# Patient Record
Sex: FEMALE | Race: WHITE | NOT HISPANIC OR LATINO | Employment: OTHER | URBAN - METROPOLITAN AREA
[De-identification: names, ages, dates, MRNs, and addresses within clinical notes are randomized per-mention and may not be internally consistent; named-entity substitution may affect disease eponyms.]

---

## 2023-02-24 PROBLEM — E78.2 HYPERLIPEMIA, MIXED: Status: ACTIVE | Noted: 2023-02-24

## 2023-02-24 PROBLEM — B02.9 SHINGLES: Status: ACTIVE | Noted: 2023-02-24

## 2023-02-24 PROBLEM — R51.9 HEADACHE: Status: ACTIVE | Noted: 2023-02-24

## 2023-02-24 PROBLEM — D49.0 IPMN (INTRADUCTAL PAPILLARY MUCINOUS NEOPLASM): Status: ACTIVE | Noted: 2023-02-24

## 2023-02-24 PROBLEM — F32.A ANXIETY AND DEPRESSION: Status: ACTIVE | Noted: 2023-02-24

## 2023-02-24 PROBLEM — H61.20 CERUMEN IMPACTION: Status: ACTIVE | Noted: 2023-02-24

## 2023-02-24 PROBLEM — R06.02 SHORTNESS OF BREATH: Status: ACTIVE | Noted: 2023-02-24

## 2023-02-24 PROBLEM — M54.50 LOWER BACK PAIN: Status: ACTIVE | Noted: 2023-02-24

## 2023-02-24 PROBLEM — F41.9 ANXIETY AND DEPRESSION: Status: ACTIVE | Noted: 2023-02-24

## 2023-02-24 PROBLEM — G47.00 INSOMNIA: Status: ACTIVE | Noted: 2023-02-24

## 2023-02-24 PROBLEM — M25.559 JOINT PAIN, HIP: Status: ACTIVE | Noted: 2023-02-24

## 2023-02-24 PROBLEM — R42 DIZZINESS: Status: ACTIVE | Noted: 2023-02-24

## 2023-02-24 PROBLEM — R51.9 PAIN OF SCALP: Status: ACTIVE | Noted: 2023-02-24

## 2023-02-24 PROBLEM — I88.9 CERVICAL LYMPHADENITIS: Status: ACTIVE | Noted: 2023-02-24

## 2023-02-24 PROBLEM — B37.0 THRUSH: Status: ACTIVE | Noted: 2023-02-24

## 2023-02-24 PROBLEM — M25.551 RIGHT HIP PAIN: Status: ACTIVE | Noted: 2023-02-24

## 2023-02-24 PROBLEM — M79.10 MYALGIA, UNSPECIFIED SITE: Status: ACTIVE | Noted: 2023-02-24

## 2023-02-24 PROBLEM — K86.89 PANCREATIC MASS (HHS-HCC): Status: ACTIVE | Noted: 2023-02-24

## 2023-02-24 PROBLEM — M19.90 ARTHRITIS: Status: ACTIVE | Noted: 2023-02-24

## 2023-02-24 PROBLEM — J45.909 ASTHMA (HHS-HCC): Status: ACTIVE | Noted: 2023-02-24

## 2023-02-24 PROBLEM — L30.4 INTERTRIGO: Status: ACTIVE | Noted: 2023-02-24

## 2023-02-24 PROBLEM — L40.50 PA (PSORIATIC ARTHRITIS) (MULTI): Status: ACTIVE | Noted: 2023-02-24

## 2023-02-24 PROBLEM — S16.1XXA STRAIN OF NECK MUSCLE: Status: ACTIVE | Noted: 2023-02-24

## 2023-02-24 PROBLEM — M70.62 TROCHANTERIC BURSITIS OF LEFT HIP: Status: ACTIVE | Noted: 2023-02-24

## 2023-02-24 PROBLEM — M23.91 INTERNAL DERANGEMENT OF RIGHT KNEE: Status: ACTIVE | Noted: 2023-02-24

## 2023-02-24 PROBLEM — M53.86 SCIATICA OF LEFT SIDE ASSOCIATED WITH DISORDER OF LUMBAR SPINE: Status: ACTIVE | Noted: 2023-02-24

## 2023-02-24 PROBLEM — R26.9 ABNORMAL GAIT: Status: ACTIVE | Noted: 2023-02-24

## 2023-02-24 PROBLEM — I10 BENIGN ESSENTIAL HYPERTENSION: Status: ACTIVE | Noted: 2023-02-24

## 2023-02-24 PROBLEM — S52.309A: Status: ACTIVE | Noted: 2023-02-24

## 2023-02-24 PROBLEM — M72.2 PLANTAR FASCIITIS: Status: ACTIVE | Noted: 2023-02-24

## 2023-02-24 PROBLEM — R06.2 WHEEZING: Status: ACTIVE | Noted: 2023-02-24

## 2023-02-24 PROBLEM — D27.9 SEROUS CYSTADENOMA: Status: ACTIVE | Noted: 2023-02-24

## 2023-02-24 PROBLEM — R25.1 TREMOR: Status: ACTIVE | Noted: 2023-02-24

## 2023-02-24 PROBLEM — M65.30 TRIGGER FINGER, ACQUIRED: Status: ACTIVE | Noted: 2023-02-24

## 2023-02-24 PROBLEM — R20.2 TINGLING: Status: ACTIVE | Noted: 2023-02-24

## 2023-02-24 PROBLEM — F32.0 MILD MAJOR DEPRESSION, SINGLE EPISODE (CMS-HCC): Status: ACTIVE | Noted: 2023-02-24

## 2023-02-24 PROBLEM — M25.561 RIGHT KNEE PAIN: Status: ACTIVE | Noted: 2023-02-24

## 2023-02-24 PROBLEM — E11.9 TYPE 2 DIABETES MELLITUS WITHOUT COMPLICATION (MULTI): Status: ACTIVE | Noted: 2023-02-24

## 2023-02-24 PROBLEM — R10.9 ABDOMINAL PAIN: Status: ACTIVE | Noted: 2023-02-24

## 2023-02-24 PROBLEM — S83.249A TEAR OF MEDIAL MENISCUS OF KNEE: Status: ACTIVE | Noted: 2023-02-24

## 2023-02-24 PROBLEM — S80.01XA CONTUSION OF RIGHT KNEE: Status: ACTIVE | Noted: 2023-02-24

## 2023-02-24 PROBLEM — M17.11 PRIMARY OSTEOARTHRITIS OF RIGHT KNEE: Status: ACTIVE | Noted: 2023-02-24

## 2023-02-24 PROBLEM — M54.31 SCIATICA OF RIGHT SIDE: Status: ACTIVE | Noted: 2023-02-24

## 2023-02-24 PROBLEM — M31.6 TEMPORAL ARTERITIS (MULTI): Status: ACTIVE | Noted: 2023-02-24

## 2023-02-24 PROBLEM — H00.014 HORDEOLUM EXTERNUM OF LEFT UPPER EYELID: Status: ACTIVE | Noted: 2023-02-24

## 2023-02-24 PROBLEM — M25.552 HIP PAIN, ACUTE, LEFT: Status: ACTIVE | Noted: 2023-02-24

## 2023-02-24 PROBLEM — S16.1XXA CERVICAL STRAIN: Status: RESOLVED | Noted: 2023-02-24 | Resolved: 2023-02-24

## 2023-02-24 PROBLEM — L40.9 PSORIASIS: Status: ACTIVE | Noted: 2023-02-24

## 2023-02-24 PROBLEM — S83.241A TEAR OF MEDIAL MENISCUS OF RIGHT KNEE, CURRENT: Status: ACTIVE | Noted: 2023-02-24

## 2023-02-24 PROBLEM — D37.8 NEOPLASM OF UNCERTAIN BEHAVIOR OF PANCREAS: Status: ACTIVE | Noted: 2023-02-24

## 2023-02-24 PROBLEM — R92.8 ABNORMAL MAMMOGRAM: Status: ACTIVE | Noted: 2023-02-24

## 2023-02-24 PROBLEM — C54.1 ENDOMETRIAL CARCINOMA (MULTI): Status: ACTIVE | Noted: 2023-02-24

## 2023-02-24 PROBLEM — R79.89 D-DIMER, ELEVATED: Status: ACTIVE | Noted: 2023-02-24

## 2023-02-24 PROBLEM — R05.9 COUGH: Status: ACTIVE | Noted: 2023-02-24

## 2023-02-24 PROBLEM — S16.1XXA CERVICAL STRAIN: Status: ACTIVE | Noted: 2023-02-24

## 2023-02-24 PROBLEM — S89.91XA INJURY OF RIGHT KNEE: Status: ACTIVE | Noted: 2023-02-24

## 2023-02-24 RX ORDER — ALBUTEROL SULFATE 90 UG/1
1-2 AEROSOL, METERED RESPIRATORY (INHALATION)
COMMUNITY
End: 2023-12-01 | Stop reason: ALTCHOICE

## 2023-02-24 RX ORDER — IBUPROFEN 600 MG/1
1 TABLET ORAL EVERY 6 HOURS
COMMUNITY
Start: 2019-08-30

## 2023-02-24 RX ORDER — METFORMIN HYDROCHLORIDE 500 MG/1
1000 TABLET, EXTENDED RELEASE ORAL 2 TIMES DAILY
COMMUNITY
Start: 2020-08-28 | End: 2023-07-19 | Stop reason: SDUPTHER

## 2023-02-24 RX ORDER — NYSTATIN 100000 U/G
CREAM TOPICAL
COMMUNITY
Start: 2020-10-30

## 2023-02-24 RX ORDER — BLOOD SUGAR DIAGNOSTIC
1 STRIP MISCELLANEOUS DAILY
COMMUNITY
Start: 2020-01-22

## 2023-02-24 RX ORDER — ASPIRIN 81 MG/1
1 TABLET ORAL EVERY OTHER DAY
COMMUNITY
Start: 2021-12-01

## 2023-02-24 RX ORDER — TRIAMCINOLONE ACETONIDE 0.25 MG/G
OINTMENT TOPICAL
COMMUNITY
Start: 2019-10-09

## 2023-02-24 RX ORDER — ALBUTEROL SULFATE 0.83 MG/ML
2.5 SOLUTION RESPIRATORY (INHALATION)
COMMUNITY
End: 2023-12-01 | Stop reason: ALTCHOICE

## 2023-02-24 RX ORDER — OLMESARTAN MEDOXOMIL AND HYDROCHLOROTHIAZIDE 40/25 40; 25 MG/1; MG/1
1 TABLET ORAL DAILY
COMMUNITY
Start: 2019-11-26 | End: 2023-06-14 | Stop reason: SDUPTHER

## 2023-02-24 RX ORDER — SERTRALINE HYDROCHLORIDE 25 MG/1
1 TABLET, FILM COATED ORAL
COMMUNITY
End: 2023-12-01 | Stop reason: SDUPTHER

## 2023-02-24 RX ORDER — GLIPIZIDE 5 MG/1
1 TABLET, FILM COATED, EXTENDED RELEASE ORAL
COMMUNITY
Start: 2022-08-25 | End: 2023-11-15 | Stop reason: ALTCHOICE

## 2023-02-24 RX ORDER — ATORVASTATIN CALCIUM 10 MG/1
1 TABLET, FILM COATED ORAL
COMMUNITY
Start: 2020-01-13 | End: 2023-06-14 | Stop reason: SDUPTHER

## 2023-02-24 RX ORDER — AMLODIPINE BESYLATE 5 MG/1
5 TABLET ORAL DAILY
COMMUNITY
Start: 2019-05-22 | End: 2023-07-19 | Stop reason: SDUPTHER

## 2023-02-24 RX ORDER — OMEPRAZOLE 20 MG/1
1 CAPSULE, DELAYED RELEASE ORAL 2 TIMES DAILY
COMMUNITY

## 2023-03-07 LAB
ALANINE AMINOTRANSFERASE (SGPT) (U/L) IN SER/PLAS: 9 U/L (ref 7–45)
ALBUMIN (G/DL) IN SER/PLAS: 4.1 G/DL (ref 3.4–5)
ALKALINE PHOSPHATASE (U/L) IN SER/PLAS: 93 U/L (ref 33–136)
ANION GAP IN SER/PLAS: 13 MMOL/L (ref 10–20)
ASPARTATE AMINOTRANSFERASE (SGOT) (U/L) IN SER/PLAS: 11 U/L (ref 9–39)
BASOPHILS (10*3/UL) IN BLOOD BY AUTOMATED COUNT: 0.15 X10E9/L (ref 0–0.1)
BASOPHILS/100 LEUKOCYTES IN BLOOD BY AUTOMATED COUNT: 1.6 % (ref 0–2)
BILIRUBIN TOTAL (MG/DL) IN SER/PLAS: 0.4 MG/DL (ref 0–1.2)
CALCIUM (MG/DL) IN SER/PLAS: 9.9 MG/DL (ref 8.6–10.6)
CARBON DIOXIDE, TOTAL (MMOL/L) IN SER/PLAS: 27 MMOL/L (ref 21–32)
CHLORIDE (MMOL/L) IN SER/PLAS: 104 MMOL/L (ref 98–107)
CHOLESTEROL (MG/DL) IN SER/PLAS: 180 MG/DL (ref 0–199)
CHOLESTEROL IN HDL (MG/DL) IN SER/PLAS: 70.8 MG/DL
CHOLESTEROL/HDL RATIO: 2.5
CREATININE (MG/DL) IN SER/PLAS: 0.72 MG/DL (ref 0.5–1.05)
EOSINOPHILS (10*3/UL) IN BLOOD BY AUTOMATED COUNT: 0.39 X10E9/L (ref 0–0.7)
EOSINOPHILS/100 LEUKOCYTES IN BLOOD BY AUTOMATED COUNT: 4.2 % (ref 0–6)
ERYTHROCYTE DISTRIBUTION WIDTH (RATIO) BY AUTOMATED COUNT: 15.6 % (ref 11.5–14.5)
ERYTHROCYTE MEAN CORPUSCULAR HEMOGLOBIN CONCENTRATION (G/DL) BY AUTOMATED: 30.3 G/DL (ref 32–36)
ERYTHROCYTE MEAN CORPUSCULAR VOLUME (FL) BY AUTOMATED COUNT: 89 FL (ref 80–100)
ERYTHROCYTES (10*6/UL) IN BLOOD BY AUTOMATED COUNT: 4.61 X10E12/L (ref 4–5.2)
ESTIMATED AVERAGE GLUCOSE FOR HBA1C: 151 MG/DL
GFR FEMALE: 90 ML/MIN/1.73M2
GLUCOSE (MG/DL) IN SER/PLAS: 143 MG/DL (ref 74–99)
HEMATOCRIT (%) IN BLOOD BY AUTOMATED COUNT: 40.9 % (ref 36–46)
HEMOGLOBIN (G/DL) IN BLOOD: 12.4 G/DL (ref 12–16)
HEMOGLOBIN A1C/HEMOGLOBIN TOTAL IN BLOOD: 6.9 %
IMMATURE GRANULOCYTES/100 LEUKOCYTES IN BLOOD BY AUTOMATED COUNT: 0.2 % (ref 0–0.9)
LDL: 85 MG/DL (ref 0–99)
LEUKOCYTES (10*3/UL) IN BLOOD BY AUTOMATED COUNT: 9.4 X10E9/L (ref 4.4–11.3)
LYMPHOCYTES (10*3/UL) IN BLOOD BY AUTOMATED COUNT: 2.96 X10E9/L (ref 1.2–4.8)
LYMPHOCYTES/100 LEUKOCYTES IN BLOOD BY AUTOMATED COUNT: 31.5 % (ref 13–44)
MONOCYTES (10*3/UL) IN BLOOD BY AUTOMATED COUNT: 0.62 X10E9/L (ref 0.1–1)
MONOCYTES/100 LEUKOCYTES IN BLOOD BY AUTOMATED COUNT: 6.6 % (ref 2–10)
NEUTROPHILS (10*3/UL) IN BLOOD BY AUTOMATED COUNT: 5.25 X10E9/L (ref 1.2–7.7)
NEUTROPHILS/100 LEUKOCYTES IN BLOOD BY AUTOMATED COUNT: 55.9 % (ref 40–80)
NRBC (PER 100 WBCS) BY AUTOMATED COUNT: 0 /100 WBC (ref 0–0)
PLATELETS (10*3/UL) IN BLOOD AUTOMATED COUNT: 365 X10E9/L (ref 150–450)
POTASSIUM (MMOL/L) IN SER/PLAS: 4.2 MMOL/L (ref 3.5–5.3)
PROTEIN TOTAL: 7.1 G/DL (ref 6.4–8.2)
SODIUM (MMOL/L) IN SER/PLAS: 140 MMOL/L (ref 136–145)
TRIGLYCERIDE (MG/DL) IN SER/PLAS: 119 MG/DL (ref 0–149)
UREA NITROGEN (MG/DL) IN SER/PLAS: 22 MG/DL (ref 6–23)
VLDL: 24 MG/DL (ref 0–40)

## 2023-03-08 LAB
ALBUMIN (MG/L) IN URINE: 7.1 MG/L
ALBUMIN/CREATININE (UG/MG) IN URINE: 6.1 UG/MG CRT (ref 0–30)
CREATININE (MG/DL) IN URINE: 116 MG/DL (ref 20–320)

## 2023-03-10 ENCOUNTER — OFFICE VISIT (OUTPATIENT)
Dept: PRIMARY CARE | Facility: CLINIC | Age: 68
End: 2023-03-10
Payer: MEDICARE

## 2023-03-10 VITALS
TEMPERATURE: 97.6 F | HEIGHT: 64 IN | RESPIRATION RATE: 16 BRPM | DIASTOLIC BLOOD PRESSURE: 78 MMHG | WEIGHT: 197 LBS | BODY MASS INDEX: 33.63 KG/M2 | HEART RATE: 77 BPM | OXYGEN SATURATION: 95 % | SYSTOLIC BLOOD PRESSURE: 134 MMHG

## 2023-03-10 DIAGNOSIS — C54.1 ENDOMETRIAL CARCINOMA (MULTI): ICD-10-CM

## 2023-03-10 DIAGNOSIS — J45.20 MILD INTERMITTENT ASTHMA WITHOUT COMPLICATION (HHS-HCC): ICD-10-CM

## 2023-03-10 DIAGNOSIS — E78.2 HYPERLIPEMIA, MIXED: ICD-10-CM

## 2023-03-10 DIAGNOSIS — F32.0 MILD MAJOR DEPRESSION, SINGLE EPISODE (CMS-HCC): ICD-10-CM

## 2023-03-10 DIAGNOSIS — D49.0 IPMN (INTRADUCTAL PAPILLARY MUCINOUS NEOPLASM): ICD-10-CM

## 2023-03-10 DIAGNOSIS — L40.50 PA (PSORIATIC ARTHRITIS) (MULTI): ICD-10-CM

## 2023-03-10 DIAGNOSIS — F41.9 ANXIETY AND DEPRESSION: ICD-10-CM

## 2023-03-10 DIAGNOSIS — F32.A ANXIETY AND DEPRESSION: ICD-10-CM

## 2023-03-10 DIAGNOSIS — I10 BENIGN ESSENTIAL HYPERTENSION: ICD-10-CM

## 2023-03-10 DIAGNOSIS — Z00.00 ENCOUNTER FOR MEDICARE ANNUAL WELLNESS EXAM: Primary | ICD-10-CM

## 2023-03-10 DIAGNOSIS — F51.01 PRIMARY INSOMNIA: ICD-10-CM

## 2023-03-10 DIAGNOSIS — E11.9 TYPE 2 DIABETES MELLITUS WITHOUT COMPLICATION, WITHOUT LONG-TERM CURRENT USE OF INSULIN (MULTI): ICD-10-CM

## 2023-03-10 PROBLEM — S16.1XXA STRAIN OF NECK MUSCLE: Status: RESOLVED | Noted: 2023-02-24 | Resolved: 2023-03-10

## 2023-03-10 PROBLEM — S89.91XA INJURY OF RIGHT KNEE: Status: RESOLVED | Noted: 2023-02-24 | Resolved: 2023-03-10

## 2023-03-10 PROBLEM — M31.6 TEMPORAL ARTERITIS (MULTI): Status: RESOLVED | Noted: 2023-02-24 | Resolved: 2023-03-10

## 2023-03-10 PROCEDURE — 3044F HG A1C LEVEL LT 7.0%: CPT | Performed by: FAMILY MEDICINE

## 2023-03-10 PROCEDURE — 1036F TOBACCO NON-USER: CPT | Performed by: FAMILY MEDICINE

## 2023-03-10 PROCEDURE — 1160F RVW MEDS BY RX/DR IN RCRD: CPT | Performed by: FAMILY MEDICINE

## 2023-03-10 PROCEDURE — 3075F SYST BP GE 130 - 139MM HG: CPT | Performed by: FAMILY MEDICINE

## 2023-03-10 PROCEDURE — 99214 OFFICE O/P EST MOD 30 MIN: CPT | Performed by: FAMILY MEDICINE

## 2023-03-10 PROCEDURE — G0439 PPPS, SUBSEQ VISIT: HCPCS | Performed by: FAMILY MEDICINE

## 2023-03-10 PROCEDURE — 3078F DIAST BP <80 MM HG: CPT | Performed by: FAMILY MEDICINE

## 2023-03-10 PROCEDURE — 1159F MED LIST DOCD IN RCRD: CPT | Performed by: FAMILY MEDICINE

## 2023-03-10 RX ORDER — FLASH GLUCOSE SENSOR
KIT MISCELLANEOUS
Qty: 6 EACH | Refills: 3 | Status: SHIPPED | OUTPATIENT
Start: 2023-03-10 | End: 2023-06-14 | Stop reason: SDUPTHER

## 2023-03-10 RX ORDER — FLASH GLUCOSE SENSOR
KIT MISCELLANEOUS
Qty: 6 EACH | Refills: 3 | Status: SHIPPED | OUTPATIENT
Start: 2023-03-10 | End: 2023-03-10 | Stop reason: SDUPTHER

## 2023-03-10 ASSESSMENT — ENCOUNTER SYMPTOMS
CARDIOVASCULAR NEGATIVE: 1
MUSCULOSKELETAL NEGATIVE: 1
GASTROINTESTINAL NEGATIVE: 1
RESPIRATORY NEGATIVE: 1
APPETITE CHANGE: 0
ENDOCRINE NEGATIVE: 1
PSYCHIATRIC NEGATIVE: 1

## 2023-03-10 ASSESSMENT — PATIENT HEALTH QUESTIONNAIRE - PHQ9
SUM OF ALL RESPONSES TO PHQ9 QUESTIONS 1 AND 2: 0
1. LITTLE INTEREST OR PLEASURE IN DOING THINGS: NOT AT ALL
2. FEELING DOWN, DEPRESSED OR HOPELESS: NOT AT ALL

## 2023-03-10 ASSESSMENT — PAIN SCALES - GENERAL: PAINLEVEL: 0-NO PAIN

## 2023-03-10 NOTE — PATIENT INSTRUCTIONS
Medicare wellness exam  Health screenings up-to-date.  Immunizations up-to-date.  Reviewed advance care planning.    Hypertension  Stable  Refilling medication at same dose  Reviewed all labs.  Recheck in 3 months.    Diabetes  Uncontrolled  Stopped Jardiance due to yeast complications.  Restarted glipizide and blood sugar uncontrolled.  She will contact endocrinology for further management.  Reviewed diet changes.  Call or return if unable to schedule with endocrinology.    Hyperlipidemia  At goal.  Review labs.  Reviewed diet.  Recheck in 3 months    Mild major depression  Stable  Continue present meds at patient request.  Recheck in 3 months    Insomnia  Stable  Continue present meds    Asthma  Stable  Continue present meds.    IPMN/endometrial carcinoma  Stable  Continue care per oncology    Psoriasis with psoriatic arthritis  Continue care per rheumatology and dermatology

## 2023-03-10 NOTE — PROGRESS NOTES
Medicare Wellness Exam    The patent is being seen for a follow up annual wellness visit  Past Medical, Surgical and family History: Reviewed and updated in chart  Interval History: Patient has not been hospitalized previously  Medications and Supplements: Review of all medications by a prescribing practitioner or clinical pharmacist (such as prescriptions, OTC, Herbal therapies and supplements) documented in the medical record.    Patient Self-Assessment of health: Fair  Tobacco Use: No  Alcohol Use: No  Illicit drug use: No  Patient using opioids: No    Current Diet: well balanced  Adequate fluid intake: Yes  Caffeine intake: No  Exercise frequency: moderately active    Depression/Suicide screening: PHQ2/ PHQ9 (see screenings tab)    Hearing impairment: No  Uses hearing aids N/A  Cognitive impairment Observation: No   Patient or family reported cognitive impairment: No    Bathing: independent  Dressing: independent  Walking: independent  Taking Medications: independent  Feeding: independent  Personal Hygiene: independent  Managing Finances: independent  Shopping: independent  Housework/Basic Home Maintenance: independent  Handling transportation: independent  Preparing meals: independent    Bowels: continent  Bladder: continent    Falls Risk: has notfallen in last 6 months.   Their fall has not resulted in an injury.   Fall risk Factors: Fall Risk Factors: None  none     Care Plan Risk: Care Plan: Low/Moderate Risk: Regular physical activity such as walking, water aerobics or vishal chi to improve strength, balance, coordination and flexibility. Wear appropriate, sensible shoe wear. Remove fall hazards at home such as loose rugs, obstacles, use non-slip surface in bath or shower. Keep living space well lit.      Home Safety Risk Factors: Home Safety Risk Factors: None  Advanced Directives:  Living will: Yes POA: Yes    Patient's End of Life Decisions: Provider agree to follow.  Subjective   Reason for Visit:  "Dacia Babcock is an 67 y.o. female here for a Medicare Wellness visit.          Reviewed all medications by prescribing practitioner or clinical pharmacist (such as prescriptions, OTCs, herbal therapies and supplements) and documented in the medical record.    Here for general check and wellness exam    Taking all meds daily and stable.     Had an episode of bad candida vaginitis  Thought was related to Jardiance and changed to glipizide. About 3 weeks ago.  Now weight increasing and BS out of control  Taking glipizide 5 mg a day  Has apt with endo in May  Getting headaches from elevated blood sugar.    Still very fatiqued  Daily routine is the same  Eating increased salads  Taking Senna    Has eye doc in June, gets yearly  Pancreatic oncologist in July    Approved for light therapy /photo therapy board for home use for psoriasis  [employer] are helping with the cost.  Not a cantidate for biologics.    Pap 7/19  Mammogram 1/23  Dexa 6/20  Colonoscopy 11/19                            Patient Care Team:  Maria G Butler Pla, DO as PCP - General  Maria G Butler Pla, DO as PCP - [employer] PCP     Review of Systems   Constitutional:  Negative for appetite change.   HENT: Negative.     Respiratory: Negative.     Cardiovascular: Negative.    Gastrointestinal: Negative.    Endocrine: Negative.    Genitourinary: Negative.    Musculoskeletal: Negative.    Skin: Negative.    Psychiatric/Behavioral: Negative.         Objective   Vitals:  /78   Pulse 77   Temp 36.4 °C (97.6 °F)   Resp 16   Ht 1.626 m (5' 4\")   Wt 89.4 kg (197 lb)   SpO2 95%   BMI 33.81 kg/m²       Physical Exam  Vitals and nursing note reviewed.   Constitutional:       Appearance: Normal appearance.   Cardiovascular:      Rate and Rhythm: Normal rate and regular rhythm.   Pulmonary:      Effort: Pulmonary effort is normal.      Breath sounds: Normal breath sounds.   Musculoskeletal:      Cervical back: Normal range " of motion.   Neurological:      Mental Status: She is alert.   Psychiatric:         Mood and Affect: Mood normal.         Behavior: Behavior normal.         Thought Content: Thought content normal.         Judgment: Judgment normal.         Assessment/Plan   Problem List Items Addressed This Visit    None    Problem List Items Addressed This Visit          Nervous    Insomnia       Respiratory    Asthma       Circulatory    Benign essential hypertension       Digestive    IPMN (intraductal papillary mucinous neoplasm)       Genitourinary    Endometrial carcinoma (CMS/[facility])       Musculoskeletal    PA (psoriatic arthritis) (CMS/[facility])       Endocrine/Metabolic    Type 2 diabetes mellitus without complication (CMS/[facility])       Other    Hyperlipemia, mixed    Mild major depression, single episode (CMS/[facility])    Anxiety and depression    Encounter for Medicare annual wellness exam - Primary      Medicare wellness exam  Health screenings up-to-date.  Immunizations up-to-date.  Reviewed advance care planning.    Hypertension  Stable  Refilling medication at same dose  Reviewed all labs.  Recheck in 3 months.    Diabetes  Uncontrolled  Stopped Jardiance due to yeast complications.  Restarted glipizide and blood sugar uncontrolled.  She will contact endocrinology for further management.  Reviewed diet changes.  Call or return if unable to schedule with endocrinology.    Hyperlipidemia  At goal.  Review labs.  Reviewed diet.  Recheck in 3 months    Mild major depression  Stable  Continue present meds at patient request.  Recheck in 3 months    Insomnia  Stable  Continue present meds    Asthma  Stable  Continue present meds.    IPMN/endometrial carcinoma  Stable  Continue care per oncology    Psoriasis with psoriatic arthritis  Continue care per rheumatology and dermatology

## 2023-03-25 ENCOUNTER — TELEPHONE (OUTPATIENT)
Dept: PRIMARY CARE | Facility: CLINIC | Age: 68
End: 2023-03-25
Payer: MEDICARE

## 2023-03-25 NOTE — TELEPHONE ENCOUNTER
Spoke with pt re hospital stay.  Admitted to [facility] overnight due to severe headache.  Was seen by her eye doc for eye pain and eye fatique the morning of admission.  He was concerned she was having a CVA due to the presentation of her headache and normal eye exam.  He sent her to the ER.  Admitted for possible temporal arteritis.  Given steroids in the ER  CT and MRI of brain nothing acute.  Sent home on Gabapentin 600 mg bid.      Started with headache, eye pain and burning in eyes a week ago.  Sharp shooting pains into her eyes lasting a few seconds. Severe pain.      Today headache a dull ache,  BS improving     Will schedule her for recheck on Friday at 11:45 Am

## 2023-03-27 ENCOUNTER — PATIENT OUTREACH (OUTPATIENT)
Dept: PRIMARY CARE | Facility: CLINIC | Age: 68
End: 2023-03-27
Payer: MEDICARE

## 2023-03-27 DIAGNOSIS — M31.6 TEMPORAL ARTERITIS (MULTI): ICD-10-CM

## 2023-03-27 RX ORDER — GABAPENTIN 100 MG/1
100 CAPSULE ORAL
COMMUNITY
End: 2024-02-14 | Stop reason: SDUPTHER

## 2023-03-27 NOTE — PROGRESS NOTES
Engagement  Call Start Time: 0240 (3/27/2023  3:00 PM)    Medications  Medications reviewed with patient/caregiver?: Yes (3/27/2023  3:00 PM)  Is the patient having any side effects they believe may be caused by any medication additions or changes?: No (3/27/2023  3:00 PM)  Does the patient have all medications ordered at discharge?: Yes (3/27/2023  3:00 PM)  Care Management Interventions: No intervention needed (3/27/2023  3:00 PM)  Prescription Comments: Gabapentin 300mh BID (3/27/2023  3:00 PM)  Is the patient taking all medications as directed (includes completed medication regime)?: Yes (3/27/2023  3:00 PM)  Care Management Interventions: Nurse provided patient education (3/27/2023  3:00 PM)  Medication Comments: Makes me sleepy (3/27/2023  3:00 PM)    Appointments  Does the patient have a primary care provider?: Yes (3/27/2023  3:00 PM)  Care Management Interventions: Verified appointment date/time/provider; Educated patient on importance of making appointment (3/27/2023  3:00 PM)  Has the patient kept scheduled appointments due by today?: Yes (3/27/2023  3:00 PM)  Care Management Interventions: Educated on importance of keeping appointment (3/27/2023  3:00 PM)    Self Management  What is the home health agency?: Denies need (3/27/2023  3:00 PM)  Has home health visited the patient within 72 hours of discharge?: Not applicable (3/27/2023  3:00 PM)  What Durable Medical Equipment (DME) was ordered?: NA (3/27/2023  3:00 PM)    Patient Teaching  Does the patient have access to their discharge instructions?: Yes (3/27/2023  3:00 PM)  Care Management Interventions: Reviewed instructions with patient; Educated on MyChart (3/27/2023  3:00 PM)  What is the patient's perception of their health status since discharge?: Improving (3/27/2023  3:00 PM)  Is the patient/caregiver able to teach back the hierarchy of who to call/visit for symptoms/problems? PCP, Specialist, Home Health nurse, Urgent Care, ED, 911: Yes  (3/27/2023  3:00 PM)    Wrap Up  Is the patient/caregiver familiar with Advance Care Planning?: Yes (3/27/2023  3:00 PM)  Would the patient like more information on Advance Care Planning?: No (3/27/2023  3:00 PM)  Call End Time: 0300 (3/27/2023  3:00 PM)      Discharge facility: [facility] [place]  Discharge diagnosis: Headache  Admission date: 3/23/2023  Discharge date:  3/25/2023

## 2023-03-31 ENCOUNTER — LAB (OUTPATIENT)
Dept: LAB | Facility: LAB | Age: 68
End: 2023-03-31
Payer: MEDICARE

## 2023-03-31 ENCOUNTER — OFFICE VISIT (OUTPATIENT)
Dept: PRIMARY CARE | Facility: CLINIC | Age: 68
End: 2023-03-31
Payer: MEDICARE

## 2023-03-31 VITALS
WEIGHT: 195.6 LBS | BODY MASS INDEX: 33.39 KG/M2 | DIASTOLIC BLOOD PRESSURE: 76 MMHG | RESPIRATION RATE: 16 BRPM | SYSTOLIC BLOOD PRESSURE: 112 MMHG | TEMPERATURE: 97.3 F | HEART RATE: 73 BPM | OXYGEN SATURATION: 98 % | HEIGHT: 64 IN

## 2023-03-31 DIAGNOSIS — L40.50 PA (PSORIATIC ARTHRITIS) (MULTI): ICD-10-CM

## 2023-03-31 DIAGNOSIS — E87.6 HYPOKALEMIA: ICD-10-CM

## 2023-03-31 DIAGNOSIS — G44.89 OTHER HEADACHE SYNDROME: ICD-10-CM

## 2023-03-31 DIAGNOSIS — E11.9 TYPE 2 DIABETES MELLITUS WITHOUT COMPLICATION, WITHOUT LONG-TERM CURRENT USE OF INSULIN (MULTI): ICD-10-CM

## 2023-03-31 DIAGNOSIS — Z09 HOSPITAL DISCHARGE FOLLOW-UP: Primary | ICD-10-CM

## 2023-03-31 PROCEDURE — 36415 COLL VENOUS BLD VENIPUNCTURE: CPT

## 2023-03-31 PROCEDURE — 3078F DIAST BP <80 MM HG: CPT | Performed by: FAMILY MEDICINE

## 2023-03-31 PROCEDURE — 85652 RBC SED RATE AUTOMATED: CPT

## 2023-03-31 PROCEDURE — 80048 BASIC METABOLIC PNL TOTAL CA: CPT

## 2023-03-31 PROCEDURE — 3044F HG A1C LEVEL LT 7.0%: CPT | Performed by: FAMILY MEDICINE

## 2023-03-31 PROCEDURE — 99215 OFFICE O/P EST HI 40 MIN: CPT | Performed by: FAMILY MEDICINE

## 2023-03-31 PROCEDURE — 1160F RVW MEDS BY RX/DR IN RCRD: CPT | Performed by: FAMILY MEDICINE

## 2023-03-31 PROCEDURE — 3074F SYST BP LT 130 MM HG: CPT | Performed by: FAMILY MEDICINE

## 2023-03-31 PROCEDURE — 1159F MED LIST DOCD IN RCRD: CPT | Performed by: FAMILY MEDICINE

## 2023-03-31 PROCEDURE — 3008F BODY MASS INDEX DOCD: CPT | Performed by: FAMILY MEDICINE

## 2023-03-31 PROCEDURE — 1036F TOBACCO NON-USER: CPT | Performed by: FAMILY MEDICINE

## 2023-03-31 ASSESSMENT — PATIENT HEALTH QUESTIONNAIRE - PHQ9
2. FEELING DOWN, DEPRESSED OR HOPELESS: NOT AT ALL
1. LITTLE INTEREST OR PLEASURE IN DOING THINGS: NOT AT ALL
SUM OF ALL RESPONSES TO PHQ9 QUESTIONS 1 AND 2: 0

## 2023-03-31 NOTE — ASSESSMENT & PLAN NOTE
Reviewed ER note  Discharge summary or TCM note not available.   Follow up with neurology  Increase gabapentin back to BID  Return if headache worsening  Recheck in 1 month

## 2023-03-31 NOTE — ASSESSMENT & PLAN NOTE
Uncontrolled  Questionable etiology  Temporal arteritis still in question  Continue meds  Continue care per neurology

## 2023-03-31 NOTE — ASSESSMENT & PLAN NOTE
Uncontrolled due to recent hospital events  Continue present meds  Continue BS monitoring  Continue diet changes  Recheck in 1 month

## 2023-03-31 NOTE — PROGRESS NOTES
"Subjective   Patient ID: Naima Babcock is a 67 y.o. female who presents for Hospital Follow-up (Headache/ eye pain; CVA vs TA).    Here for hospital discharge follow-up.    Admitted to [facility] last weekend for severe headache.  Was sent there by her eye doctor where she presented with right eye pain.  Eye exam is normal.  Diabetic check is normal, mild cataract  He was concerned with possible temporal arteritis.  Did not get bx of temporal artery.  Was started on Gabapentin  Causes fatique so cut back to bedtime instead of Bid  Headache is returning.   Was to follow up in 2 weeks but unable to get in until August.  Finally able to get in May 16th.    Reluctant to start oral steroids due to BS elevations.    Blood sugars up and down  220 this am an d prior to visit 125  No other med changes             Review of Systems    Objective   /76   Pulse 73   Temp 36.3 °C (97.3 °F)   Resp 16   Ht 1.626 m (5' 4\")   Wt 88.7 kg (195 lb 9.6 oz)   SpO2 98%   BMI 33.57 kg/m²     Physical Exam  Vitals and nursing note reviewed.   Constitutional:       Appearance: Normal appearance.   Cardiovascular:      Rate and Rhythm: Normal rate and regular rhythm.   Pulmonary:      Effort: Pulmonary effort is normal.      Breath sounds: Normal breath sounds.   Musculoskeletal:      Cervical back: Normal range of motion.   Neurological:      Mental Status: She is alert.   Psychiatric:         Mood and Affect: Mood normal.         Behavior: Behavior normal.         Thought Content: Thought content normal.         Judgment: Judgment normal.         Assessment/Plan   Problem List Items Addressed This Visit          Nervous    Other headache syndrome     Uncontrolled  Questionable etiology  Temporal arteritis still in question  Continue meds  Continue care per neurology            Musculoskeletal    PA (psoriatic arthritis) (CMS/[facility])    Relevant Orders    Sedimentation Rate       Endocrine/Metabolic    Type 2 diabetes mellitus " without complication (CMS/HCC)     Uncontrolled due to recent hospital events  Continue present meds  Continue BS monitoring  Continue diet changes  Recheck in 1 month            Other    Hospital discharge follow-up - Primary     Reviewed ER note  Discharge summary or TCM note not available.   Follow up with neurology  Increase gabapentin back to BID  Return if headache worsening  Recheck in 1 month            Other Visit Diagnoses       Hypokalemia        Relevant Orders    Basic Metabolic Panel

## 2023-04-01 LAB
ANION GAP IN SER/PLAS: 14 MMOL/L (ref 10–20)
CALCIUM (MG/DL) IN SER/PLAS: 9.9 MG/DL (ref 8.6–10.6)
CARBON DIOXIDE, TOTAL (MMOL/L) IN SER/PLAS: 28 MMOL/L (ref 21–32)
CHLORIDE (MMOL/L) IN SER/PLAS: 105 MMOL/L (ref 98–107)
CREATININE (MG/DL) IN SER/PLAS: 0.96 MG/DL (ref 0.5–1.05)
GFR FEMALE: 64 ML/MIN/1.73M2
GLUCOSE (MG/DL) IN SER/PLAS: 77 MG/DL (ref 74–99)
POTASSIUM (MMOL/L) IN SER/PLAS: 4.2 MMOL/L (ref 3.5–5.3)
SEDIMENTATION RATE, ERYTHROCYTE: 16 MM/H (ref 0–30)
SODIUM (MMOL/L) IN SER/PLAS: 143 MMOL/L (ref 136–145)
UREA NITROGEN (MG/DL) IN SER/PLAS: 16 MG/DL (ref 6–23)

## 2023-04-05 ENCOUNTER — TELEPHONE (OUTPATIENT)
Dept: PRIMARY CARE | Facility: CLINIC | Age: 68
End: 2023-04-05
Payer: MEDICARE

## 2023-05-03 ENCOUNTER — OFFICE VISIT (OUTPATIENT)
Dept: PRIMARY CARE | Facility: CLINIC | Age: 68
End: 2023-05-03
Payer: MEDICARE

## 2023-05-03 VITALS
OXYGEN SATURATION: 98 % | SYSTOLIC BLOOD PRESSURE: 110 MMHG | TEMPERATURE: 96.9 F | RESPIRATION RATE: 16 BRPM | HEART RATE: 68 BPM | DIASTOLIC BLOOD PRESSURE: 66 MMHG

## 2023-05-03 DIAGNOSIS — I10 BENIGN ESSENTIAL HYPERTENSION: ICD-10-CM

## 2023-05-03 DIAGNOSIS — G44.89 OTHER HEADACHE SYNDROME: Primary | ICD-10-CM

## 2023-05-03 DIAGNOSIS — E11.9 TYPE 2 DIABETES MELLITUS WITHOUT COMPLICATION, WITHOUT LONG-TERM CURRENT USE OF INSULIN (MULTI): ICD-10-CM

## 2023-05-03 PROCEDURE — 3008F BODY MASS INDEX DOCD: CPT | Performed by: FAMILY MEDICINE

## 2023-05-03 PROCEDURE — 1160F RVW MEDS BY RX/DR IN RCRD: CPT | Performed by: FAMILY MEDICINE

## 2023-05-03 PROCEDURE — 1036F TOBACCO NON-USER: CPT | Performed by: FAMILY MEDICINE

## 2023-05-03 PROCEDURE — 3078F DIAST BP <80 MM HG: CPT | Performed by: FAMILY MEDICINE

## 2023-05-03 PROCEDURE — 1159F MED LIST DOCD IN RCRD: CPT | Performed by: FAMILY MEDICINE

## 2023-05-03 PROCEDURE — 3074F SYST BP LT 130 MM HG: CPT | Performed by: FAMILY MEDICINE

## 2023-05-03 PROCEDURE — 99213 OFFICE O/P EST LOW 20 MIN: CPT | Performed by: FAMILY MEDICINE

## 2023-05-03 PROCEDURE — 3044F HG A1C LEVEL LT 7.0%: CPT | Performed by: FAMILY MEDICINE

## 2023-05-03 ASSESSMENT — PATIENT HEALTH QUESTIONNAIRE - PHQ9
4. FEELING TIRED OR HAVING LITTLE ENERGY: SEVERAL DAYS
8. MOVING OR SPEAKING SO SLOWLY THAT OTHER PEOPLE COULD HAVE NOTICED. OR THE OPPOSITE, BEING SO FIGETY OR RESTLESS THAT YOU HAVE BEEN MOVING AROUND A LOT MORE THAN USUAL: NOT AT ALL
7. TROUBLE CONCENTRATING ON THINGS, SUCH AS READING THE NEWSPAPER OR WATCHING TELEVISION: NOT AT ALL
3. TROUBLE FALLING OR STAYING ASLEEP OR SLEEPING TOO MUCH: SEVERAL DAYS
10. IF YOU CHECKED OFF ANY PROBLEMS, HOW DIFFICULT HAVE THESE PROBLEMS MADE IT FOR YOU TO DO YOUR WORK, TAKE CARE OF THINGS AT HOME, OR GET ALONG WITH OTHER PEOPLE: NOT DIFFICULT AT ALL
9. THOUGHTS THAT YOU WOULD BE BETTER OFF DEAD, OR OF HURTING YOURSELF: NOT AT ALL
5. POOR APPETITE OR OVEREATING: NOT AT ALL
1. LITTLE INTEREST OR PLEASURE IN DOING THINGS: NOT AT ALL
6. FEELING BAD ABOUT YOURSELF - OR THAT YOU ARE A FAILURE OR HAVE LET YOURSELF OR YOUR FAMILY DOWN: NOT AT ALL
2. FEELING DOWN, DEPRESSED OR HOPELESS: NOT AT ALL
SUM OF ALL RESPONSES TO PHQ QUESTIONS 1-9: 2
SUM OF ALL RESPONSES TO PHQ9 QUESTIONS 1 AND 2: 0

## 2023-05-03 ASSESSMENT — ANXIETY QUESTIONNAIRES
GAD7 TOTAL SCORE: 0
2. NOT BEING ABLE TO STOP OR CONTROL WORRYING: NOT AT ALL
IF YOU CHECKED OFF ANY PROBLEMS ON THIS QUESTIONNAIRE, HOW DIFFICULT HAVE THESE PROBLEMS MADE IT FOR YOU TO DO YOUR WORK, TAKE CARE OF THINGS AT HOME, OR GET ALONG WITH OTHER PEOPLE: NOT DIFFICULT AT ALL
6. BECOMING EASILY ANNOYED OR IRRITABLE: NOT AT ALL
1. FEELING NERVOUS, ANXIOUS, OR ON EDGE: NOT AT ALL
4. TROUBLE RELAXING: NOT AT ALL
7. FEELING AFRAID AS IF SOMETHING AWFUL MIGHT HAPPEN: NOT AT ALL
5. BEING SO RESTLESS THAT IT IS HARD TO SIT STILL: NOT AT ALL
3. WORRYING TOO MUCH ABOUT DIFFERENT THINGS: NOT AT ALL

## 2023-05-03 NOTE — ASSESSMENT & PLAN NOTE
Uncontrolled  Suspect illness related.  Continue diet changes.  Continue present meds.  Repeat A1c level in June

## 2023-05-03 NOTE — ASSESSMENT & PLAN NOTE
Improving but not at goal.  Reviewed recent labs.  Continue gabapentin as per neurology.  Follow-up as scheduled with neurology.

## 2023-05-03 NOTE — PROGRESS NOTES
Subjective   Patient ID: Naima Babcock is a 67 y.o. female who presents for Follow-up (1 mth follow-up headaches).    Here for one month recheck    Got COVID right after last visit  Was sick, fevers, body aches   Lasted 6 days.  Headaches were really bad.  Feeling better    Had VV with neurology  Taking gabapentin 300 mg at night, 100 mg in am  Dulling headaches but not resolved  Not temporal arteritis  Dx'd with ice pick headaches, possibly from temporal shingles in the past or tic doleraux   Light sensitivity in the left eye  Monitoring     Blood sugars have been ok  Bad last night, had chicken noodle soup  Last a1c level at 6.9  Weight is stable.     Sticking to her routine   Getting out to [employer] functions               Review of Systems    Objective   /66   Pulse 68   Temp 36.1 °C (96.9 °F)   Resp 16   SpO2 98%     Physical Exam  Vitals and nursing note reviewed.   Constitutional:       Appearance: Normal appearance.   Cardiovascular:      Rate and Rhythm: Normal rate and regular rhythm.   Pulmonary:      Effort: Pulmonary effort is normal.      Breath sounds: Normal breath sounds.   Musculoskeletal:      Cervical back: Normal range of motion.   Neurological:      Mental Status: She is alert.   Psychiatric:         Mood and Affect: Mood normal.         Behavior: Behavior normal.         Thought Content: Thought content normal.         Judgment: Judgment normal.         Assessment/Plan   Problem List Items Addressed This Visit          Nervous    Other headache syndrome - Primary     Improving but not at goal.  Reviewed recent labs.  Continue gabapentin as per neurology.  Follow-up as scheduled with neurology.              Circulatory    Benign essential hypertension     Stable  Continue present meds.  Recheck in 1 month            Endocrine/Metabolic    Type 2 diabetes mellitus without complication (CMS/HCC)     Uncontrolled  Suspect illness related.  Continue diet changes.  Continue present  meds.  Repeat A1c level in [place]

## 2023-06-05 LAB
C REACTIVE PROTEIN (MG/L) IN SER/PLAS: 1.11 MG/DL
SEDIMENTATION RATE, ERYTHROCYTE: 7 MM/H (ref 0–30)

## 2023-06-14 ENCOUNTER — LAB (OUTPATIENT)
Dept: LAB | Facility: LAB | Age: 68
End: 2023-06-14
Payer: MEDICARE

## 2023-06-14 ENCOUNTER — OFFICE VISIT (OUTPATIENT)
Dept: PRIMARY CARE | Facility: CLINIC | Age: 68
End: 2023-06-14
Payer: MEDICARE

## 2023-06-14 VITALS
SYSTOLIC BLOOD PRESSURE: 118 MMHG | TEMPERATURE: 97.3 F | RESPIRATION RATE: 16 BRPM | HEART RATE: 74 BPM | WEIGHT: 196.6 LBS | OXYGEN SATURATION: 96 % | DIASTOLIC BLOOD PRESSURE: 76 MMHG | BODY MASS INDEX: 33.75 KG/M2

## 2023-06-14 DIAGNOSIS — I10 BENIGN ESSENTIAL HYPERTENSION: Primary | ICD-10-CM

## 2023-06-14 DIAGNOSIS — F32.0 MILD MAJOR DEPRESSION, SINGLE EPISODE (CMS-HCC): ICD-10-CM

## 2023-06-14 DIAGNOSIS — I65.23 ATHEROSCLEROSIS OF BOTH CAROTID ARTERIES: ICD-10-CM

## 2023-06-14 DIAGNOSIS — I10 BENIGN ESSENTIAL HYPERTENSION: ICD-10-CM

## 2023-06-14 DIAGNOSIS — E11.9 TYPE 2 DIABETES MELLITUS WITHOUT COMPLICATION, WITHOUT LONG-TERM CURRENT USE OF INSULIN (MULTI): ICD-10-CM

## 2023-06-14 DIAGNOSIS — G44.89 OTHER HEADACHE SYNDROME: ICD-10-CM

## 2023-06-14 DIAGNOSIS — E78.2 HYPERLIPEMIA, MIXED: ICD-10-CM

## 2023-06-14 DIAGNOSIS — G56.92 NEUROPATHY OF LEFT UPPER EXTREMITY: ICD-10-CM

## 2023-06-14 LAB
ALANINE AMINOTRANSFERASE (SGPT) (U/L) IN SER/PLAS: 10 U/L (ref 7–45)
ALBUMIN (G/DL) IN SER/PLAS: 4.2 G/DL (ref 3.4–5)
ALKALINE PHOSPHATASE (U/L) IN SER/PLAS: 94 U/L (ref 33–136)
ANION GAP IN SER/PLAS: 15 MMOL/L (ref 10–20)
ASPARTATE AMINOTRANSFERASE (SGOT) (U/L) IN SER/PLAS: 12 U/L (ref 9–39)
BASOPHILS (10*3/UL) IN BLOOD BY AUTOMATED COUNT: 0.13 X10E9/L (ref 0–0.1)
BASOPHILS/100 LEUKOCYTES IN BLOOD BY AUTOMATED COUNT: 1.5 % (ref 0–2)
BILIRUBIN TOTAL (MG/DL) IN SER/PLAS: 0.5 MG/DL (ref 0–1.2)
CALCIUM (MG/DL) IN SER/PLAS: 10 MG/DL (ref 8.6–10.6)
CARBON DIOXIDE, TOTAL (MMOL/L) IN SER/PLAS: 25 MMOL/L (ref 21–32)
CHLORIDE (MMOL/L) IN SER/PLAS: 105 MMOL/L (ref 98–107)
CREATININE (MG/DL) IN SER/PLAS: 0.78 MG/DL (ref 0.5–1.05)
EOSINOPHILS (10*3/UL) IN BLOOD BY AUTOMATED COUNT: 0.39 X10E9/L (ref 0–0.7)
EOSINOPHILS/100 LEUKOCYTES IN BLOOD BY AUTOMATED COUNT: 4.4 % (ref 0–6)
ERYTHROCYTE DISTRIBUTION WIDTH (RATIO) BY AUTOMATED COUNT: 14.5 % (ref 11.5–14.5)
ERYTHROCYTE MEAN CORPUSCULAR HEMOGLOBIN CONCENTRATION (G/DL) BY AUTOMATED: 30.3 G/DL (ref 32–36)
ERYTHROCYTE MEAN CORPUSCULAR VOLUME (FL) BY AUTOMATED COUNT: 94 FL (ref 80–100)
ERYTHROCYTES (10*6/UL) IN BLOOD BY AUTOMATED COUNT: 4.41 X10E12/L (ref 4–5.2)
GFR FEMALE: 82 ML/MIN/1.73M2
GLUCOSE (MG/DL) IN SER/PLAS: 130 MG/DL (ref 74–99)
HEMATOCRIT (%) IN BLOOD BY AUTOMATED COUNT: 41.3 % (ref 36–46)
HEMOGLOBIN (G/DL) IN BLOOD: 12.5 G/DL (ref 12–16)
IMMATURE GRANULOCYTES/100 LEUKOCYTES IN BLOOD BY AUTOMATED COUNT: 0.2 % (ref 0–0.9)
LEUKOCYTES (10*3/UL) IN BLOOD BY AUTOMATED COUNT: 8.9 X10E9/L (ref 4.4–11.3)
LYMPHOCYTES (10*3/UL) IN BLOOD BY AUTOMATED COUNT: 2.66 X10E9/L (ref 1.2–4.8)
LYMPHOCYTES/100 LEUKOCYTES IN BLOOD BY AUTOMATED COUNT: 29.8 % (ref 13–44)
MONOCYTES (10*3/UL) IN BLOOD BY AUTOMATED COUNT: 0.54 X10E9/L (ref 0.1–1)
MONOCYTES/100 LEUKOCYTES IN BLOOD BY AUTOMATED COUNT: 6 % (ref 2–10)
NEUTROPHILS (10*3/UL) IN BLOOD BY AUTOMATED COUNT: 5.19 X10E9/L (ref 1.2–7.7)
NEUTROPHILS/100 LEUKOCYTES IN BLOOD BY AUTOMATED COUNT: 58.1 % (ref 40–80)
NRBC (PER 100 WBCS) BY AUTOMATED COUNT: 0 /100 WBC (ref 0–0)
PLATELETS (10*3/UL) IN BLOOD AUTOMATED COUNT: 377 X10E9/L (ref 150–450)
POTASSIUM (MMOL/L) IN SER/PLAS: 4.3 MMOL/L (ref 3.5–5.3)
PROTEIN TOTAL: 7.2 G/DL (ref 6.4–8.2)
SODIUM (MMOL/L) IN SER/PLAS: 141 MMOL/L (ref 136–145)
UREA NITROGEN (MG/DL) IN SER/PLAS: 26 MG/DL (ref 6–23)

## 2023-06-14 PROCEDURE — 99214 OFFICE O/P EST MOD 30 MIN: CPT | Performed by: FAMILY MEDICINE

## 2023-06-14 PROCEDURE — 3078F DIAST BP <80 MM HG: CPT | Performed by: FAMILY MEDICINE

## 2023-06-14 PROCEDURE — 1036F TOBACCO NON-USER: CPT | Performed by: FAMILY MEDICINE

## 2023-06-14 PROCEDURE — 1159F MED LIST DOCD IN RCRD: CPT | Performed by: FAMILY MEDICINE

## 2023-06-14 PROCEDURE — 1160F RVW MEDS BY RX/DR IN RCRD: CPT | Performed by: FAMILY MEDICINE

## 2023-06-14 PROCEDURE — 3044F HG A1C LEVEL LT 7.0%: CPT | Performed by: FAMILY MEDICINE

## 2023-06-14 PROCEDURE — 83036 HEMOGLOBIN GLYCOSYLATED A1C: CPT

## 2023-06-14 PROCEDURE — 3008F BODY MASS INDEX DOCD: CPT | Performed by: FAMILY MEDICINE

## 2023-06-14 PROCEDURE — 80053 COMPREHEN METABOLIC PANEL: CPT

## 2023-06-14 PROCEDURE — 36415 COLL VENOUS BLD VENIPUNCTURE: CPT

## 2023-06-14 PROCEDURE — 85025 COMPLETE CBC W/AUTO DIFF WBC: CPT

## 2023-06-14 PROCEDURE — 3074F SYST BP LT 130 MM HG: CPT | Performed by: FAMILY MEDICINE

## 2023-06-14 RX ORDER — FLASH GLUCOSE SENSOR
KIT MISCELLANEOUS
Qty: 6 EACH | Refills: 3 | Status: SHIPPED | OUTPATIENT
Start: 2023-06-14

## 2023-06-14 RX ORDER — OLMESARTAN MEDOXOMIL AND HYDROCHLOROTHIAZIDE 40/25 40; 25 MG/1; MG/1
1 TABLET ORAL DAILY
Qty: 90 TABLET | Refills: 1 | Status: SHIPPED | OUTPATIENT
Start: 2023-06-14 | End: 2023-11-28

## 2023-06-14 RX ORDER — ATORVASTATIN CALCIUM 10 MG/1
10 TABLET, FILM COATED ORAL
Qty: 90 TABLET | Refills: 1 | Status: SHIPPED | OUTPATIENT
Start: 2023-06-14 | End: 2023-12-01 | Stop reason: SDUPTHER

## 2023-06-14 ASSESSMENT — PATIENT HEALTH QUESTIONNAIRE - PHQ9
1. LITTLE INTEREST OR PLEASURE IN DOING THINGS: NOT AT ALL
SUM OF ALL RESPONSES TO PHQ9 QUESTIONS 1 AND 2: 0
2. FEELING DOWN, DEPRESSED OR HOPELESS: NOT AT ALL

## 2023-06-14 NOTE — PROGRESS NOTES
Subjective   Patient ID: Naima Babcock is a 67 y.o. female who presents for Follow-up (3 mth med and BP check).    Here for med check and refills.     Taking meds daily   Seeing endo and trying Januvia but cost prohibitive   Blood sugars up and down.  96% of the time in target  No other change in meds     Diet has been good  Weight stable  Walking regular   Sleeping poorly, declines meds    Recent visit with Derm and cryo to several actinic  Seeing neurology, has an in office visit in August   Gabapentin helping with the headaches  Ice pick headaches    Still with left arm weakness, dull deep achy pain worse at night with laying down.  Constant feeling.  No limitations with movement.          Review of Systems    Objective   /76   Pulse 74   Temp 36.3 °C (97.3 °F)   Resp 16   Wt 89.2 kg (196 lb 9.6 oz)   SpO2 96%   BMI 33.75 kg/m²     Physical Exam  Vitals and nursing note reviewed.   Constitutional:       Appearance: Normal appearance.   Cardiovascular:      Rate and Rhythm: Normal rate and regular rhythm.   Pulmonary:      Effort: Pulmonary effort is normal.      Breath sounds: Normal breath sounds.   Musculoskeletal:      Cervical back: Normal range of motion.   Neurological:      Mental Status: She is alert.   Psychiatric:         Mood and Affect: Mood normal.         Behavior: Behavior normal.         Thought Content: Thought content normal.         Judgment: Judgment normal.       Assessment/Plan   Problem List Items Addressed This Visit       Hyperlipemia, mixed     Checking fasting labs and adjust meds as needed  Continue to limit fats and sugar in diet         Relevant Medications    atorvastatin (Lipitor) 10 mg tablet    Other Relevant Orders    Comprehensive Metabolic Panel    Mild major depression, single episode (CMS/HCC)    Benign essential hypertension - Primary     Stable  Checking labs  Refilling meds at the same dose.   Recheck in 3 months         Relevant Medications     olmesartan-hydrochlorothiazide (BENIcar HCT) 40-25 mg tablet    Other Relevant Orders    CBC and Auto Differential    Type 2 diabetes mellitus without complication (CMS/HCC)     Stable  Checking A1C   Eye exam UTD  Continue care per endo         Relevant Medications    FreeStyle Shyla sensor system (FreeStyle Shyla 2 Sensor) kit    Other Relevant Orders    Hemoglobin A1C    Other headache syndrome     Stable on meds  Continue care per neurology          Other Visit Diagnoses       Atherosclerosis of both carotid arteries        checking carotid US    Relevant Orders    Vascular US carotid artery duplex bilateral    Neuropathy of left upper extremity        questionable etiolgy  Declines EMG  continue to monitor

## 2023-06-15 LAB
ESTIMATED AVERAGE GLUCOSE FOR HBA1C: 146 MG/DL
HEMOGLOBIN A1C/HEMOGLOBIN TOTAL IN BLOOD: 6.7 %

## 2023-07-19 DIAGNOSIS — E11.9 TYPE 2 DIABETES MELLITUS WITHOUT COMPLICATION, WITHOUT LONG-TERM CURRENT USE OF INSULIN (MULTI): ICD-10-CM

## 2023-07-19 DIAGNOSIS — I10 BENIGN ESSENTIAL HYPERTENSION: Primary | ICD-10-CM

## 2023-07-19 RX ORDER — METFORMIN HYDROCHLORIDE 500 MG/1
1000 TABLET, EXTENDED RELEASE ORAL 2 TIMES DAILY
Qty: 360 TABLET | Refills: 0 | Status: SHIPPED | OUTPATIENT
Start: 2023-07-19 | End: 2023-10-16

## 2023-07-19 RX ORDER — AMLODIPINE BESYLATE 5 MG/1
5 TABLET ORAL DAILY
Qty: 90 TABLET | Refills: 0 | Status: SHIPPED | OUTPATIENT
Start: 2023-07-19 | End: 2023-12-01 | Stop reason: SDUPTHER

## 2023-07-19 NOTE — TELEPHONE ENCOUNTER
Next OV 09/15 med refill metformin and amlodipine send to [facility]    Please advise if its Metformin 500mg 2 x daily or 1000mg 2 x daily?

## 2023-09-08 ENCOUNTER — OFFICE VISIT (OUTPATIENT)
Dept: PRIMARY CARE | Facility: CLINIC | Age: 68
End: 2023-09-08
Payer: MEDICARE

## 2023-09-08 VITALS
DIASTOLIC BLOOD PRESSURE: 70 MMHG | OXYGEN SATURATION: 96 % | TEMPERATURE: 97.7 F | HEART RATE: 76 BPM | SYSTOLIC BLOOD PRESSURE: 122 MMHG

## 2023-09-08 DIAGNOSIS — F51.01 PRIMARY INSOMNIA: ICD-10-CM

## 2023-09-08 DIAGNOSIS — E11.9 TYPE 2 DIABETES MELLITUS WITHOUT COMPLICATION, WITHOUT LONG-TERM CURRENT USE OF INSULIN (MULTI): ICD-10-CM

## 2023-09-08 DIAGNOSIS — I10 BENIGN ESSENTIAL HYPERTENSION: ICD-10-CM

## 2023-09-08 DIAGNOSIS — E78.2 HYPERLIPEMIA, MIXED: Primary | ICD-10-CM

## 2023-09-08 DIAGNOSIS — F32.0 MILD MAJOR DEPRESSION, SINGLE EPISODE (CMS-HCC): ICD-10-CM

## 2023-09-08 DIAGNOSIS — G44.89 OTHER HEADACHE SYNDROME: ICD-10-CM

## 2023-09-08 PROCEDURE — 3008F BODY MASS INDEX DOCD: CPT | Performed by: FAMILY MEDICINE

## 2023-09-08 PROCEDURE — 3078F DIAST BP <80 MM HG: CPT | Performed by: FAMILY MEDICINE

## 2023-09-08 PROCEDURE — 3044F HG A1C LEVEL LT 7.0%: CPT | Performed by: FAMILY MEDICINE

## 2023-09-08 PROCEDURE — 99214 OFFICE O/P EST MOD 30 MIN: CPT | Performed by: FAMILY MEDICINE

## 2023-09-08 PROCEDURE — 3074F SYST BP LT 130 MM HG: CPT | Performed by: FAMILY MEDICINE

## 2023-09-08 PROCEDURE — 1036F TOBACCO NON-USER: CPT | Performed by: FAMILY MEDICINE

## 2023-09-08 PROCEDURE — 1126F AMNT PAIN NOTED NONE PRSNT: CPT | Performed by: FAMILY MEDICINE

## 2023-09-08 PROCEDURE — 1159F MED LIST DOCD IN RCRD: CPT | Performed by: FAMILY MEDICINE

## 2023-09-08 PROCEDURE — 1160F RVW MEDS BY RX/DR IN RCRD: CPT | Performed by: FAMILY MEDICINE

## 2023-09-08 NOTE — ASSESSMENT & PLAN NOTE
Stable  Checking fasting labs.  Continue weight loss efforts.  Continue regular physical activity.  Recheck in 3 months

## 2023-09-08 NOTE — PROGRESS NOTES
Subjective   Patient ID: Naima Babcock is a 68 y.o. female who presents for Follow-up (3 mth med and BP check).    Here for general check and med refill.    Seeing endo for DM and stable  Has apt in November   Scheduleing apt with podiatry  Denies chest pain, shortness of breath, lightheaded, dizziness or headaches.     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  Increased her dose last visit.     Getting neuropathic pains in her legs  Declines PT eval.    Moved in with her daughter  Doing much better  Helping out with the grandkids    Eye exams regular  Just got new glasses  Cataracts but not ready for repair.             Review of Systems    Objective   /70   Pulse 76   Temp 36.5 °C (97.7 °F)   SpO2 96%     Physical Exam  Vitals and nursing note reviewed.   Constitutional:       Appearance: Normal appearance.   Cardiovascular:      Rate and Rhythm: Normal rate and regular rhythm.   Pulmonary:      Effort: Pulmonary effort is normal.      Breath sounds: Normal breath sounds.   Musculoskeletal:      Cervical back: Normal range of motion.   Neurological:      Mental Status: She is alert.   Psychiatric:         Mood and Affect: Mood normal.         Behavior: Behavior normal.         Thought Content: Thought content normal.         Judgment: Judgment normal.       Assessment/Plan   Problem List Items Addressed This Visit       Hyperlipemia, mixed - Primary     Stable  Checking fasting labs and adjust meds accordingly         Relevant Orders    Comprehensive Metabolic Panel    Lipid Panel    Insomnia     Stable  Continue to monitor         Mild major depression, single episode (CMS/HCC)     Stable  Refilling medication at same dose.  Recheck in 3 months         Benign essential hypertension     Stable  Checking  fasting labs.  Continue weight loss efforts.  Continue regular physical activity.  Recheck in 3 months         Relevant Orders    CBC and Auto Differential    Type 2 diabetes mellitus without complication (CMS/[facility])     Checking A1c level.  Continue care per endocrinology         Relevant Orders    Hemoglobin A1C    Other headache syndrome     Improved with increased dose of gabapentin.  Continue care per neurology

## 2023-09-15 ENCOUNTER — APPOINTMENT (OUTPATIENT)
Dept: PRIMARY CARE | Facility: CLINIC | Age: 68
End: 2023-09-15
Payer: MEDICARE

## 2023-09-15 ENCOUNTER — LAB (OUTPATIENT)
Dept: LAB | Facility: LAB | Age: 68
End: 2023-09-15
Payer: MEDICARE

## 2023-09-15 DIAGNOSIS — I10 BENIGN ESSENTIAL HYPERTENSION: ICD-10-CM

## 2023-09-15 DIAGNOSIS — E11.9 TYPE 2 DIABETES MELLITUS WITHOUT COMPLICATION, WITHOUT LONG-TERM CURRENT USE OF INSULIN (MULTI): ICD-10-CM

## 2023-09-15 DIAGNOSIS — E78.2 HYPERLIPEMIA, MIXED: ICD-10-CM

## 2023-09-15 LAB
ALANINE AMINOTRANSFERASE (SGPT) (U/L) IN SER/PLAS: 9 U/L (ref 7–45)
ALBUMIN (G/DL) IN SER/PLAS: 3.9 G/DL (ref 3.4–5)
ALKALINE PHOSPHATASE (U/L) IN SER/PLAS: 75 U/L (ref 33–136)
ANION GAP IN SER/PLAS: 13 MMOL/L (ref 10–20)
ASPARTATE AMINOTRANSFERASE (SGOT) (U/L) IN SER/PLAS: 10 U/L (ref 9–39)
BASOPHILS (10*3/UL) IN BLOOD BY AUTOMATED COUNT: 0.12 X10E9/L (ref 0–0.1)
BASOPHILS/100 LEUKOCYTES IN BLOOD BY AUTOMATED COUNT: 1.6 % (ref 0–2)
BILIRUBIN TOTAL (MG/DL) IN SER/PLAS: 0.4 MG/DL (ref 0–1.2)
CALCIUM (MG/DL) IN SER/PLAS: 9.3 MG/DL (ref 8.6–10.6)
CARBON DIOXIDE, TOTAL (MMOL/L) IN SER/PLAS: 27 MMOL/L (ref 21–32)
CHLORIDE (MMOL/L) IN SER/PLAS: 107 MMOL/L (ref 98–107)
CHOLESTEROL (MG/DL) IN SER/PLAS: 144 MG/DL (ref 0–199)
CHOLESTEROL IN HDL (MG/DL) IN SER/PLAS: 58.5 MG/DL
CHOLESTEROL/HDL RATIO: 2.5
CREATININE (MG/DL) IN SER/PLAS: 0.8 MG/DL (ref 0.5–1.05)
EOSINOPHILS (10*3/UL) IN BLOOD BY AUTOMATED COUNT: 0.49 X10E9/L (ref 0–0.7)
EOSINOPHILS/100 LEUKOCYTES IN BLOOD BY AUTOMATED COUNT: 6.3 % (ref 0–6)
ERYTHROCYTE DISTRIBUTION WIDTH (RATIO) BY AUTOMATED COUNT: 13.3 % (ref 11.5–14.5)
ERYTHROCYTE MEAN CORPUSCULAR HEMOGLOBIN CONCENTRATION (G/DL) BY AUTOMATED: 30.3 G/DL (ref 32–36)
ERYTHROCYTE MEAN CORPUSCULAR VOLUME (FL) BY AUTOMATED COUNT: 92 FL (ref 80–100)
ERYTHROCYTES (10*6/UL) IN BLOOD BY AUTOMATED COUNT: 4.15 X10E12/L (ref 4–5.2)
ESTIMATED AVERAGE GLUCOSE FOR HBA1C: 137 MG/DL
GFR FEMALE: 79 ML/MIN/1.73M2
GLUCOSE (MG/DL) IN SER/PLAS: 116 MG/DL (ref 74–99)
HEMATOCRIT (%) IN BLOOD BY AUTOMATED COUNT: 38.3 % (ref 36–46)
HEMOGLOBIN (G/DL) IN BLOOD: 11.6 G/DL (ref 12–16)
HEMOGLOBIN A1C/HEMOGLOBIN TOTAL IN BLOOD: 6.4 %
IMMATURE GRANULOCYTES/100 LEUKOCYTES IN BLOOD BY AUTOMATED COUNT: 0.3 % (ref 0–0.9)
LDL: 62 MG/DL (ref 0–99)
LEUKOCYTES (10*3/UL) IN BLOOD BY AUTOMATED COUNT: 7.7 X10E9/L (ref 4.4–11.3)
LYMPHOCYTES (10*3/UL) IN BLOOD BY AUTOMATED COUNT: 2.66 X10E9/L (ref 1.2–4.8)
LYMPHOCYTES/100 LEUKOCYTES IN BLOOD BY AUTOMATED COUNT: 34.4 % (ref 13–44)
MONOCYTES (10*3/UL) IN BLOOD BY AUTOMATED COUNT: 0.58 X10E9/L (ref 0.1–1)
MONOCYTES/100 LEUKOCYTES IN BLOOD BY AUTOMATED COUNT: 7.5 % (ref 2–10)
NEUTROPHILS (10*3/UL) IN BLOOD BY AUTOMATED COUNT: 3.87 X10E9/L (ref 1.2–7.7)
NEUTROPHILS/100 LEUKOCYTES IN BLOOD BY AUTOMATED COUNT: 49.9 % (ref 40–80)
NRBC (PER 100 WBCS) BY AUTOMATED COUNT: 0 /100 WBC (ref 0–0)
PLATELETS (10*3/UL) IN BLOOD AUTOMATED COUNT: 349 X10E9/L (ref 150–450)
POTASSIUM (MMOL/L) IN SER/PLAS: 4.2 MMOL/L (ref 3.5–5.3)
PROTEIN TOTAL: 6.6 G/DL (ref 6.4–8.2)
SODIUM (MMOL/L) IN SER/PLAS: 143 MMOL/L (ref 136–145)
TRIGLYCERIDE (MG/DL) IN SER/PLAS: 120 MG/DL (ref 0–149)
UREA NITROGEN (MG/DL) IN SER/PLAS: 23 MG/DL (ref 6–23)
VLDL: 24 MG/DL (ref 0–40)

## 2023-09-15 PROCEDURE — 80053 COMPREHEN METABOLIC PANEL: CPT

## 2023-09-15 PROCEDURE — 80061 LIPID PANEL: CPT

## 2023-09-15 PROCEDURE — 36415 COLL VENOUS BLD VENIPUNCTURE: CPT

## 2023-09-15 PROCEDURE — 85025 COMPLETE CBC W/AUTO DIFF WBC: CPT

## 2023-09-15 PROCEDURE — 83036 HEMOGLOBIN GLYCOSYLATED A1C: CPT

## 2023-10-14 DIAGNOSIS — E11.9 TYPE 2 DIABETES MELLITUS WITHOUT COMPLICATION, WITHOUT LONG-TERM CURRENT USE OF INSULIN (MULTI): ICD-10-CM

## 2023-10-16 RX ORDER — METFORMIN HYDROCHLORIDE 500 MG/1
1000 TABLET, EXTENDED RELEASE ORAL 2 TIMES DAILY
Qty: 360 TABLET | Refills: 1 | Status: SHIPPED | OUTPATIENT
Start: 2023-10-16 | End: 2024-03-01 | Stop reason: SDUPTHER

## 2023-10-16 NOTE — TELEPHONE ENCOUNTER
Next OV 12/01   Requested Prescriptions     Pending Prescriptions Disp Refills    metFORMIN  mg 24 hr tablet [Pharmacy Med Name: metFORMIN HCl ER Oral Tablet Extended Release 24 Hour 500 MG] 360 tablet 0     Sig: TAKE TWO TABLETS BY MOUTH TWO TIMES A DAY

## 2023-10-23 DIAGNOSIS — R51.9 BAD HEADACHE: ICD-10-CM

## 2023-10-24 RX ORDER — GABAPENTIN 100 MG/1
CAPSULE ORAL
Qty: 60 CAPSULE | Refills: 1 | OUTPATIENT
Start: 2023-10-24

## 2023-11-07 RX ORDER — GABAPENTIN 100 MG/1
CAPSULE ORAL
Qty: 60 CAPSULE | Refills: 0 | OUTPATIENT
Start: 2023-11-07

## 2023-11-15 ENCOUNTER — OFFICE VISIT (OUTPATIENT)
Dept: ENDOCRINOLOGY | Facility: HOSPITAL | Age: 68
End: 2023-11-15
Payer: MEDICARE

## 2023-11-15 VITALS
SYSTOLIC BLOOD PRESSURE: 142 MMHG | BODY MASS INDEX: 34.2 KG/M2 | HEART RATE: 73 BPM | WEIGHT: 193 LBS | TEMPERATURE: 97.1 F | HEIGHT: 63 IN | DIASTOLIC BLOOD PRESSURE: 89 MMHG | OXYGEN SATURATION: 100 %

## 2023-11-15 DIAGNOSIS — E11.9 TYPE 2 DIABETES MELLITUS WITHOUT COMPLICATION, WITHOUT LONG-TERM CURRENT USE OF INSULIN (MULTI): Primary | ICD-10-CM

## 2023-11-15 LAB — GLUCOSE BLD MANUAL STRIP-MCNC: 135 MG/DL (ref 74–99)

## 2023-11-15 PROCEDURE — 1126F AMNT PAIN NOTED NONE PRSNT: CPT | Performed by: STUDENT IN AN ORGANIZED HEALTH CARE EDUCATION/TRAINING PROGRAM

## 2023-11-15 PROCEDURE — 99215 OFFICE O/P EST HI 40 MIN: CPT | Performed by: STUDENT IN AN ORGANIZED HEALTH CARE EDUCATION/TRAINING PROGRAM

## 2023-11-15 PROCEDURE — 3077F SYST BP >= 140 MM HG: CPT | Performed by: STUDENT IN AN ORGANIZED HEALTH CARE EDUCATION/TRAINING PROGRAM

## 2023-11-15 PROCEDURE — 3079F DIAST BP 80-89 MM HG: CPT | Performed by: STUDENT IN AN ORGANIZED HEALTH CARE EDUCATION/TRAINING PROGRAM

## 2023-11-15 PROCEDURE — 1159F MED LIST DOCD IN RCRD: CPT | Performed by: STUDENT IN AN ORGANIZED HEALTH CARE EDUCATION/TRAINING PROGRAM

## 2023-11-15 PROCEDURE — 36416 COLLJ CAPILLARY BLOOD SPEC: CPT | Performed by: STUDENT IN AN ORGANIZED HEALTH CARE EDUCATION/TRAINING PROGRAM

## 2023-11-15 PROCEDURE — 1036F TOBACCO NON-USER: CPT | Performed by: STUDENT IN AN ORGANIZED HEALTH CARE EDUCATION/TRAINING PROGRAM

## 2023-11-15 PROCEDURE — 3008F BODY MASS INDEX DOCD: CPT | Performed by: STUDENT IN AN ORGANIZED HEALTH CARE EDUCATION/TRAINING PROGRAM

## 2023-11-15 PROCEDURE — 1160F RVW MEDS BY RX/DR IN RCRD: CPT | Performed by: STUDENT IN AN ORGANIZED HEALTH CARE EDUCATION/TRAINING PROGRAM

## 2023-11-15 PROCEDURE — 3044F HG A1C LEVEL LT 7.0%: CPT | Performed by: STUDENT IN AN ORGANIZED HEALTH CARE EDUCATION/TRAINING PROGRAM

## 2023-11-15 PROCEDURE — 82947 ASSAY GLUCOSE BLOOD QUANT: CPT | Performed by: STUDENT IN AN ORGANIZED HEALTH CARE EDUCATION/TRAINING PROGRAM

## 2023-11-15 RX ORDER — GLIPIZIDE 2.5 MG/1
2.5 TABLET, EXTENDED RELEASE ORAL
Qty: 90 TABLET | Refills: 3 | Status: SHIPPED | OUTPATIENT
Start: 2023-11-15 | End: 2024-11-14

## 2023-11-15 SDOH — ECONOMIC STABILITY: FOOD INSECURITY: WITHIN THE PAST 12 MONTHS, YOU WORRIED THAT YOUR FOOD WOULD RUN OUT BEFORE YOU GOT MONEY TO BUY MORE.: NEVER TRUE

## 2023-11-15 SDOH — ECONOMIC STABILITY: FOOD INSECURITY: WITHIN THE PAST 12 MONTHS, THE FOOD YOU BOUGHT JUST DIDN'T LAST AND YOU DIDN'T HAVE MONEY TO GET MORE.: NEVER TRUE

## 2023-11-15 ASSESSMENT — PATIENT HEALTH QUESTIONNAIRE - PHQ9
1. LITTLE INTEREST OR PLEASURE IN DOING THINGS: NOT AT ALL
SUM OF ALL RESPONSES TO PHQ9 QUESTIONS 1 & 2: 0
2. FEELING DOWN, DEPRESSED OR HOPELESS: NOT AT ALL

## 2023-11-15 ASSESSMENT — ENCOUNTER SYMPTOMS
LOSS OF SENSATION IN FEET: 0
OCCASIONAL FEELINGS OF UNSTEADINESS: 1
DEPRESSION: 0

## 2023-11-15 ASSESSMENT — LIFESTYLE VARIABLES
HOW OFTEN DO YOU HAVE SIX OR MORE DRINKS ON ONE OCCASION: NEVER
HOW OFTEN DO YOU HAVE A DRINK CONTAINING ALCOHOL: NEVER
SKIP TO QUESTIONS 9-10: 1
HOW MANY STANDARD DRINKS CONTAINING ALCOHOL DO YOU HAVE ON A TYPICAL DAY: PATIENT DOES NOT DRINK
AUDIT-C TOTAL SCORE: 0

## 2023-11-15 ASSESSMENT — PAIN SCALES - GENERAL: PAINLEVEL: 0-NO PAIN

## 2023-11-15 NOTE — PROGRESS NOTES
Assessment and plan  Dacia Babcock is a 68 y.o. female with pmh of T2DM, Endometrial cancer, IPMN, HTN and HLD coming in for follow up of T2DM complicated by multiple yeast infections leading to discontinuation of jardiance    A1c down to 6.4 from 6.7 but continuing to experience decreased energy with fluctuations in BS from highs to lows. On interpretation of CGM she has good time in range 93% with 0% lows and minimal highs.We will cut out SANTANA to prevent lows.    Plan:  Continue metformin 500 mg extended release 2 tablets twice daily  Decrease glipizide to 2.5 mg daily at lunchtime which is largest meal of the day.If continues to have fluctuations and decreased energy we can stop glipizide and switch to januvia 50 mg daily  Follow with ophthalmology and Podiatry  Continue Atorvastatin 10 mg once daily  Continue olmesartan-HCTZ     blood work before next apt     Follow up in 3 months     Case seen, examined, and discussed with Dr. Youngblood  Consults    History Of Present Illness  Dacia Babcock is a 68 y.o. female with pmh of T2DM, Endometrial cancer, IPMN, HTN and HLD coming in for follow up   Background History :  Date of diagnosis: 2020. Date of last HbA1c: 9/2023 - 6.4   Lab Results   Component Value Date    HGBA1C 6.4 (A) 09/15/2023    HGBA1C 6.7 (A) 06/14/2023    HGBA1C 6.9 (A) 03/07/2023     09/15/2023    K 4.2 09/15/2023     09/15/2023    CO2 27 09/15/2023    BUN 23 09/15/2023    CREATININE 0.80 09/15/2023    CALCIUM 9.3 09/15/2023    ALBUMIN 3.9 09/15/2023    PROT 6.6 09/15/2023    BILITOT 0.4 09/15/2023    ALKPHOS 75 09/15/2023    ALT 9 09/15/2023    AST 10 09/15/2023    GLUCOSE 116 (H) 09/15/2023    CHOL 144 09/15/2023    TRIG 120 09/15/2023    HDL 58.5 09/15/2023      Latest Reference Range & Units 01/11/20 08:08 03/08/23 09:00   Albumin/Creatine Ratio 0.0 - 30.0 ug/mg crt 10.2 6.1      LCV - may 2023 -Had a severe yeast infection received Diflucan for 2 days. So she was switched to  "glipizide ER 5 mg once daily and Jardiance was stopped    Today endorses lows states that after dinner went from 187 to 57. Other day was driiving and felt like she was having a low but was 242 . Lost 4-5 lbs recently. Eating very healthy after moving in with her daughter . 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 Feels exhausted when numbers fluctuate from highs to lows.    Current DM Regimen:. Metformin 500 mg 2 tabs ER BID  Glipizide ER 5 mg q am and then eats meal.  Hypoglycemia - 57 during the daytime , She has sx of diaphoresis and woozy. Happens twice a week . Takes sugar pills to counter   Glucose Ranges: CGM reviewed occasional low in am and sometimes elevated around lunch  Diabetes Surveillance: Eye exam: 2 weeks. Sees optometrist at [facility] .   Foot exam/podiatrist:  No foot ulcers. Recent had sprained ankle  Saw dpm in October   No neuropathy  Cardiovascular: no coronary artery bypass graft and no coronary artery disease . On Atorvastatin 10 mg dailyand On ASA 81 mg daily  Renal: no nephropathy and no end stage renal disease . UACR neg 2020 on olmesartan 40-[id number]-25.   Norvasc for BP      CGM INTERPRETATION: Average blood glucose: 128 mg/dl.~CGM BG standard variability: 24.1.~Range:  mg/dl.~Consistent with current A1c: 6.4 %.~   0 % of time worn spend below 70mg/dL.~   93% of time worn spent at target 70-180mg/dL.~   7% of time worn spent above 180mg/dL.   Overall trend and \"glucose pattern insights\" reveals: Pt does have occasional lows in the 50s but unclear if these are truly lows. Occasional fluctuations in leve.s   Overall glucose is generally stable near average.   Patient checks glucose 5 + times a day.   CGM data was used to influence glucose control treatment plan.~   Minimum of 72 hours of data were reviewed.     Nutrition   Breakfast @ 730 am - poached egg  Lunch @ 1230- half a sandwich   Dinner @ 6pm - salmon and asparagus and salad   Snacks -peanut butter cracker " or regular saltine or ritz , fruits- usually mid afternoon. Before going to bed eats yogurt or half sandwich to prevent lows  Beverages - water or decaf ice tea.    Endometrial cancer  s/p radidx 2.5 years Has IPMNs being watched by [facility] and stable.  Has pancreatic lesions but no pancreatitis  Mother had acromegaly dx in her 60s  from Diabetes.      Past Medical History  She has a past medical history of Candidal stomatitis (2021), Erythema intertrigo (10/30/2020), Essential (primary) hypertension, Other specified abnormal findings of blood chemistry (2021), Other specified diseases of pancreas (10/23/2019), Other tear of medial meniscus, current injury, right knee, initial encounter (2021), Other tear of medial meniscus, current injury, right knee, subsequent encounter (2020), Other tear of medial meniscus, current injury, unspecified knee, initial encounter (2020), Personal history of neoplasm of uncertain behavior, Personal history of other diseases of the nervous system and sense organs (2015), Personal history of other diseases of the nervous system and sense organs (2020), Personal history of other diseases of the respiratory system, Personal history of other endocrine, nutritional and metabolic disease, Personal history of other infectious and parasitic diseases (2015), Personal history of other mental and behavioral disorders (10/02/2013), Personal history of other specified conditions (2014), Plantar fascial fibromatosis (2019), Temporal arteritis (CMS/HCC) (2023), Trigger finger, unspecified finger (2022), Trochanteric bursitis, left hip (2017), Unspecified fracture of shaft of unspecified radius, initial encounter for closed fracture (2018), and Unspecified internal derangement of right knee (2021).    Surgical History  She has a past surgical history that includes Appendectomy (10/02/2013); Breast lumpectomy  (10/02/2013); Tubal ligation (10/02/2013); Other surgical history (10/02/2013); Other surgical history (10/22/2019); Other surgical history (10/22/2019); Other surgical history (04/02/2021); Other surgical history (04/02/2021); MR angio head wo IV contrast (11/11/2020); CT angio neck (3/23/2023); and CT angio head w and wo IV contrast (3/23/2023).     Social History  She reports that she has never smoked. She has never been exposed to tobacco smoke. She has never used smokeless tobacco. She reports that she does not currently use alcohol. She reports that she does not currently use drugs.    Family History  Family History   Problem Relation Name Age of Onset    Alzheimer's disease Mother      Brain cancer Mother      Heart failure Mother      Kidney cancer Father          clear cell adenocarcinoma of the kidney    Kidney cancer Sister      Breast cancer Maternal Grandmother        ROS, PMH, FH/SH, surgical history and allergies have been reviewed.    10 pt ROS negative except as mentioned above     Current Outpatient Medications on File Prior to Visit   Medication Sig Dispense Refill    albuterol 2.5 mg /3 mL (0.083 %) nebulizer solution Inhale 3 mL (2.5 mg). Every 4 to 6 hours as needed for wheezing      albuterol 90 mcg/actuation inhaler Inhale 1-2 puffs. Every 4 to 6 hours as needed and as directed      amLODIPine (Norvasc) 5 mg tablet Take 1 tablet (5 mg) by mouth once daily. As directed 90 tablet 0    aspirin 81 mg EC tablet Take 1 tablet (81 mg) by mouth every other day.      atorvastatin (Lipitor) 10 mg tablet Take 1 tablet (10 mg) by mouth once every day. 90 tablet 1    blood sugar diagnostic (Accu-Chek Guide test strips) strip 1 strip once daily.      FreeStyle Shyla sensor system (FreeStyle Shyla 2 Sensor) kit Use to test blood sugar daily 6 each 3    gabapentin (Neurontin) 100 mg capsule Take 1 capsule (100 mg) by mouth 5 times a day. 100mg AM; 100mg evening; 300mg QHS      glipiZIDE XL (Glucotrol XL) 5  mg 24 hr tablet Take 1 tablet (5 mg) by mouth once daily with a meal.      ibuprofen 600 mg tablet Take 1 tablet (600 mg) by mouth every 6 hours.      metFORMIN  mg 24 hr tablet TAKE TWO TABLETS BY MOUTH TWO TIMES A  tablet 1    nystatin (Mycostatin) cream Apply 2-3 times daily to affected areas      olmesartan-hydrochlorothiazide (BENIcar HCT) 40-25 mg tablet Take 1 tablet by mouth once daily. 90 tablet 1    omeprazole (PriLOSEC) 20 mg DR capsule Take 1 capsule (20 mg) by mouth 2 times a day.      sertraline (Zoloft) 25 mg tablet Take 1 tablet (25 mg) by mouth once every day.      triamcinolone (Kenalog) 0.025 % ointment Apply to affected areas on trunk twice a day avoid face and groin       No current facility-administered medications on file prior to visit.      Visit Vitals  /89 (BP Location: Left arm, Patient Position: Sitting, BP Cuff Size: Adult)   Pulse 73   Temp 36.2 °C (97.1 °F) (Temporal)       Physical Exam  Constitutional: NAD, well groomed. AOx3. Cooperative  Skin/Hair: Warm, dry skin.  HEENT: EOMI, Anicteric scleras   Neck: Soft, supple   Cardiovascular: normal HR  Respiratory: no increased wob,  accessory muscle use.  Abdomen: Soft, nondistended  Extremities: No peripheral edema.  Neuro: Moving all extremities spontaneously

## 2023-11-27 DIAGNOSIS — I10 BENIGN ESSENTIAL HYPERTENSION: ICD-10-CM

## 2023-11-28 RX ORDER — OLMESARTAN MEDOXOMIL AND HYDROCHLOROTHIAZIDE 40/25 40; 25 MG/1; MG/1
1 TABLET ORAL DAILY
Qty: 90 TABLET | Refills: 0 | Status: SHIPPED | OUTPATIENT
Start: 2023-11-28 | End: 2024-02-28 | Stop reason: SDUPTHER

## 2023-11-28 NOTE — TELEPHONE ENCOUNTER
----- Message from Dacia Babcock sent at 11/27/2023  5:30 PM EST -----  Regarding: Sick  Contact: [phone]  Hi I have appointment with you Friday. Do I need bloodwork?  Also I have a very bad sore throat and ears Low grade temp. Could you send something in for me. Thanks. Naima de guzman and gregg Carrillo.

## 2023-12-01 ENCOUNTER — TELEMEDICINE (OUTPATIENT)
Dept: PRIMARY CARE | Facility: CLINIC | Age: 68
End: 2023-12-01
Payer: MEDICARE

## 2023-12-01 VITALS
HEART RATE: 94 BPM | BODY MASS INDEX: 33.48 KG/M2 | WEIGHT: 189 LBS | TEMPERATURE: 99.4 F | DIASTOLIC BLOOD PRESSURE: 82 MMHG | SYSTOLIC BLOOD PRESSURE: 137 MMHG

## 2023-12-01 DIAGNOSIS — F32.0 MILD MAJOR DEPRESSION, SINGLE EPISODE (CMS-HCC): ICD-10-CM

## 2023-12-01 DIAGNOSIS — Z12.31 ENCOUNTER FOR SCREENING MAMMOGRAM FOR MALIGNANT NEOPLASM OF BREAST: ICD-10-CM

## 2023-12-01 DIAGNOSIS — I10 BENIGN ESSENTIAL HYPERTENSION: Primary | ICD-10-CM

## 2023-12-01 DIAGNOSIS — E11.42 DIABETIC POLYNEUROPATHY ASSOCIATED WITH TYPE 2 DIABETES MELLITUS (MULTI): ICD-10-CM

## 2023-12-01 DIAGNOSIS — E78.2 HYPERLIPEMIA, MIXED: ICD-10-CM

## 2023-12-01 DIAGNOSIS — E11.9 TYPE 2 DIABETES MELLITUS WITHOUT COMPLICATION, WITHOUT LONG-TERM CURRENT USE OF INSULIN (MULTI): ICD-10-CM

## 2023-12-01 PROCEDURE — 99214 OFFICE O/P EST MOD 30 MIN: CPT | Performed by: FAMILY MEDICINE

## 2023-12-01 RX ORDER — SERTRALINE HYDROCHLORIDE 25 MG/1
25 TABLET, FILM COATED ORAL
Qty: 90 TABLET | Refills: 1 | Status: SHIPPED | OUTPATIENT
Start: 2023-12-01 | End: 2024-03-01 | Stop reason: SDUPTHER

## 2023-12-01 RX ORDER — AMLODIPINE BESYLATE 5 MG/1
5 TABLET ORAL DAILY
Qty: 90 TABLET | Refills: 1 | Status: SHIPPED | OUTPATIENT
Start: 2023-12-01 | End: 2024-03-01 | Stop reason: SDUPTHER

## 2023-12-01 RX ORDER — ATORVASTATIN CALCIUM 10 MG/1
10 TABLET, FILM COATED ORAL
Qty: 90 TABLET | Refills: 1 | Status: SHIPPED | OUTPATIENT
Start: 2023-12-01 | End: 2024-03-01 | Stop reason: SDUPTHER

## 2023-12-01 NOTE — ASSESSMENT & PLAN NOTE
Stable  Home checks in range  Refilling meds at the same dose  Checking basic labs  Recheck in 6 months

## 2023-12-01 NOTE — PATIENT INSTRUCTIONS

## 2023-12-01 NOTE — ASSESSMENT & PLAN NOTE
Stable   Continue present meds  Consider wean off in the spring  Refilling at the same dose.   Recheck in 6 months

## 2023-12-01 NOTE — PROGRESS NOTES
Subjective   Patient ID: Naima Babcock is a 68 y.o. female who presents for Follow-up (3 mth med and BP check/).    [facility]well virtual visit for med check and refill.    Patient unable to come to office due to strep infection  Was seen in  on Wednesday 11/30  Treated with amoxil and starting to feel better.  Still with ST but improving.     Home BS are still high  In contact with endo and meds adjusted    Home BP checks have been great  130's/70's  Denies chest pain, shortness of breath, lightheaded, dizziness or headaches.   In general taking all meds and tolerating well.   Denies concerns.   Careful with ambulating and steps for falls risk  Sleeping ok  Mood and affect are stable on meds  Would like to get off the sertraline.             Review of Systems    Objective   /82 Comment: per pt  Pulse 94 Comment: per pt  Temp 37.4 °C (99.4 °F) Comment: per pt  Wt 85.7 kg (189 lb) Comment: per pt  BMI 33.48 kg/m²     Physical Exam  Constitutional:       Appearance: Normal appearance.   Pulmonary:      Effort: Pulmonary effort is normal.   Neurological:      Mental Status: She is alert.   Psychiatric:         Mood and Affect: Mood normal.         Behavior: Behavior normal.         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   Recheck in 6 months         Relevant Medications    sertraline (Zoloft) 25 mg tablet    Benign essential hypertension - Primary I10     Stable  Home checks in range  Refilling meds at the same dose  Checking basic labs  Recheck in 6 months         Relevant  Medications    amLODIPine (Norvasc) 5 mg tablet    Other Relevant Orders    CBC and Auto Differential    Type 2 diabetes mellitus without complication (CMS/HCC) E11.9    Relevant Orders    Hemoglobin A1C    Diabetic polyneuropathy associated with type 2 diabetes mellitus (CMS/HCC) E11.42     Stable  Continue care per endo          Other Visit Diagnoses         Codes    Encounter for screening mammogram for malignant neoplasm of breast     Z12.31    Relevant Orders    BI mammo bilateral screening tomosynthesis             Patient was identified as a fall risk. Risk prevention instructions provided.

## 2023-12-14 ENCOUNTER — OFFICE VISIT (OUTPATIENT)
Dept: GYNECOLOGIC ONCOLOGY | Facility: CLINIC | Age: 68
End: 2023-12-14
Payer: MEDICARE

## 2023-12-14 VITALS
DIASTOLIC BLOOD PRESSURE: 84 MMHG | RESPIRATION RATE: 18 BRPM | WEIGHT: 193.78 LBS | TEMPERATURE: 97.5 F | HEART RATE: 72 BPM | SYSTOLIC BLOOD PRESSURE: 142 MMHG | OXYGEN SATURATION: 98 % | BODY MASS INDEX: 34.33 KG/M2

## 2023-12-14 DIAGNOSIS — C54.1 ENDOMETRIAL CARCINOMA (MULTI): Primary | ICD-10-CM

## 2023-12-14 PROCEDURE — 1126F AMNT PAIN NOTED NONE PRSNT: CPT | Performed by: NURSE PRACTITIONER

## 2023-12-14 PROCEDURE — 99214 OFFICE O/P EST MOD 30 MIN: CPT | Performed by: NURSE PRACTITIONER

## 2023-12-14 PROCEDURE — 3079F DIAST BP 80-89 MM HG: CPT | Performed by: NURSE PRACTITIONER

## 2023-12-14 PROCEDURE — 3044F HG A1C LEVEL LT 7.0%: CPT | Performed by: NURSE PRACTITIONER

## 2023-12-14 PROCEDURE — 1159F MED LIST DOCD IN RCRD: CPT | Performed by: NURSE PRACTITIONER

## 2023-12-14 PROCEDURE — 3077F SYST BP >= 140 MM HG: CPT | Performed by: NURSE PRACTITIONER

## 2023-12-14 PROCEDURE — 3008F BODY MASS INDEX DOCD: CPT | Performed by: NURSE PRACTITIONER

## 2023-12-14 PROCEDURE — 1160F RVW MEDS BY RX/DR IN RCRD: CPT | Performed by: NURSE PRACTITIONER

## 2023-12-14 PROCEDURE — 1036F TOBACCO NON-USER: CPT | Performed by: NURSE PRACTITIONER

## 2023-12-14 ASSESSMENT — COLUMBIA-SUICIDE SEVERITY RATING SCALE - C-SSRS
2. HAVE YOU ACTUALLY HAD ANY THOUGHTS OF KILLING YOURSELF?: NO
6. HAVE YOU EVER DONE ANYTHING, STARTED TO DO ANYTHING, OR PREPARED TO DO ANYTHING TO END YOUR LIFE?: NO
1. IN THE PAST MONTH, HAVE YOU WISHED YOU WERE DEAD OR WISHED YOU COULD GO TO SLEEP AND NOT WAKE UP?: NO

## 2023-12-14 ASSESSMENT — ENCOUNTER SYMPTOMS
OCCASIONAL FEELINGS OF UNSTEADINESS: 0
LOSS OF SENSATION IN FEET: 0
DEPRESSION: 0

## 2023-12-14 ASSESSMENT — PAIN SCALES - GENERAL: PAINLEVEL: 0-NO PAIN

## 2023-12-14 NOTE — PROGRESS NOTES
Patient ID: Naima Babcock is a 68 y.o. female.    Subjective    HPI    Referred by Dr. Reddy  PCP is Dr. Maria G Todda     63-year-old with history of endometrial cancer, diagnosed August 2019.     mass history  - 6/19/19 CT abd/pelvis - Heterogeneous appearance of the uterus with suspicion of a myometrial mass. This is not well evaluated with current CT. Pelvic ultrasound is recommended  for further evaluation since endometrial neoplasm cannot be excluded. 2. Numerous cystic changes in the pancreas, one in the pancreatic head and one in the pancreatic tail. Differential diagnosis includes changes from chronic pancreatitis however serous  cystic neoplastic changes cannot be excluded. Correlate with the patient's clinical history, MRI/MRCP can be obtained for further evaluation/characterization if clinically indicated. 3. Mild steatosis hepatis. 4. Probable renal cysts.   -7/3/19 TVUS - The retroflexed uterus measured 7.2 x 3.8 x 4.9 cm. There was heterogeneous myometrium. There was an anterior corpus/fundal hypoechoic solid mass measuring 20 x 13 x 10 mm. There was an additional anterior hypoechoic myometrial nodule measuring  12 x 10 x 10 mm.. The endometrial lining was heterogeneous and thickened with multiple cystic changes. It measured up to 11 mm in AP thickness. There was trace cervical canal fluid... RIGHT OVARY: The right ovary measured 12 x 24 x 16 mm. There was no  suspicious solid or cystic right ovarian lesion. Color Doppler and spectral Doppler showed preserved arterial and venous blood flow in the right ovary. LEFT OVARY: The left ovary measured 20 x 13 x 15 mm. There was no suspicious solid or cystic left ovarian  lesion. Color Doppler and spectral Doppler showed preserved arterial and venous blood flow in the left ovary.  - 07/29/2019, D&C.  Pathology reported for atypical mucinous proliferation cannot exclude mucinous endometrial cancer  - 08/09/2019, case presented at GYN tumor board.   Impression was atypical mucinous proliferation concerning for mucinous endometrial cancer.  Recommendations were for hysterectomy.   - 2019 TLH/BSO sentinel pelvic lymph node dissection.  Final pathology reported as a grade 1 mucinous endometrial cancer with no myometrial invasion.  Negative sentinel lymph nodes.  Mismatch repair testing was normal.  - 2019 -case presented at GYN tumor board.  Impression was stage IA mucinous endometrial cancer/  recommendations were for surveillance, optec, survivorship   - 2021 - biopsy of vaginal lesion, benign     Interval History: Dacia is a 68 year old female s/p TLH/BSO sentinel pelvic lymph node dissection for Stage IA mucinous endometrial cancer on 19. Patient denies any vaginal bleeding or abnormal discharge. Appetite has been good. Energy levels are baseline. Patient denies urinary leakage or incontinence. She denies any urgency or frequency. Denies any weight loss or weight gain. Does not report any abdominal pain or bloating besides discomfort related to constipation. Occasional RLQ cramping. She moved in with her daughter and is doing well since  from her partner.     Medical History:  Psoriasis  Arthritis  Diabetes  Hypertension     Surgical History:  Tubal ligation   Appendectomy   Breast lumpectomy   Orthopedic surgery, right wrist 2018     Social History:  Denies tobacco, alcohol or illicit drug use.  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  She is . Her  is  of Alzheimer's. She lives with her fiancé     Obstetrics/Gynecology History:  G3, P2,  ×2. SAB ×1  Menopausal since .  Denies history of prior abnormal Pap smears.  Denies postmenopausal bleeding. Denies gynecologic problems.     Family History:  2 sisters had renal cell carcinoma.  There are no family members with breast, uterine, ovarian or colon malignancy    Objective    BSA: 1.98 meters squared  /84    Pulse 72   Temp 36.4 °C (97.5 °F)   Resp 18   Wt 87.9 kg (193 lb 12.6 oz)   SpO2 98%   BMI 34.33 kg/m²      Physical Exam  Vitals and nursing note reviewed.   Constitutional:       Appearance: Normal appearance. She is normal weight.   HENT:      Mouth/Throat:      Mouth: Mucous membranes are moist.      Pharynx: Oropharynx is clear.   Eyes:      Conjunctiva/sclera: Conjunctivae normal.      Pupils: Pupils are equal, round, and reactive to light.   Cardiovascular:      Rate and Rhythm: Normal rate and regular rhythm.   Pulmonary:      Effort: Pulmonary effort is normal.      Breath sounds: Normal breath sounds.   Abdominal:      General: Abdomen is flat. There is no distension.      Palpations: Abdomen is soft. There is no mass.      Tenderness: There is no abdominal tenderness.   Genitourinary:     General: Normal vulva.      Vagina: Normal.      Uterus: Absent.       Rectum: Normal.   Musculoskeletal:         General: Normal range of motion.   Skin:     General: Skin is warm and dry.   Neurological:      Mental Status: She is alert.   Psychiatric:         Mood and Affect: Mood normal.         Behavior: Behavior normal.       Performance Status:  Asymptomatic      Assessment/Plan     Dacia is a 68 year old female s/p TLH/BSO sentinel pelvic lymph node dissection for Stage IA mucinous endometrial cancer on 8/29/19.     Plan:    Physical examination was within normal limits today.  She is currently MORRIS.  We reviewed signs and symptoms of possible recurrence with the patient and she will call  our office should she experience any of these.     Follow up in 6 months for surveillance visit     Instructed to call office with any concerns       Dhara Newberry, NAM-CNP

## 2023-12-20 ENCOUNTER — LAB (OUTPATIENT)
Dept: LAB | Facility: LAB | Age: 68
End: 2023-12-20
Payer: MEDICARE

## 2023-12-20 DIAGNOSIS — E11.9 TYPE 2 DIABETES MELLITUS WITHOUT COMPLICATION, WITHOUT LONG-TERM CURRENT USE OF INSULIN (MULTI): ICD-10-CM

## 2023-12-20 DIAGNOSIS — I10 BENIGN ESSENTIAL HYPERTENSION: ICD-10-CM

## 2023-12-20 DIAGNOSIS — E78.2 HYPERLIPEMIA, MIXED: ICD-10-CM

## 2023-12-20 LAB
ALBUMIN SERPL BCP-MCNC: 4 G/DL (ref 3.4–5)
ALP SERPL-CCNC: 73 U/L (ref 33–136)
ALT SERPL W P-5'-P-CCNC: 10 U/L (ref 7–45)
ANION GAP SERPL CALC-SCNC: 15 MMOL/L
AST SERPL W P-5'-P-CCNC: 11 U/L (ref 9–39)
BASOPHILS # BLD AUTO: 0.11 X10*3/UL (ref 0–0.1)
BASOPHILS NFR BLD AUTO: 1.4 %
BILIRUB SERPL-MCNC: 0.5 MG/DL (ref 0–1.2)
BUN SERPL-MCNC: 30 MG/DL (ref 6–23)
CALCIUM SERPL-MCNC: 9.5 MG/DL (ref 8.6–10.6)
CHLORIDE SERPL-SCNC: 104 MMOL/L (ref 98–107)
CHOLEST SERPL-MCNC: 156 MG/DL (ref 0–199)
CHOLESTEROL/HDL RATIO: 2.2
CO2 SERPL-SCNC: 27 MMOL/L (ref 21–32)
CREAT SERPL-MCNC: 0.78 MG/DL (ref 0.5–1.05)
EOSINOPHIL # BLD AUTO: 0.31 X10*3/UL (ref 0–0.7)
EOSINOPHIL NFR BLD AUTO: 4.1 %
ERYTHROCYTE [DISTWIDTH] IN BLOOD BY AUTOMATED COUNT: 14.4 % (ref 11.5–14.5)
EST. AVERAGE GLUCOSE BLD GHB EST-MCNC: 140 MG/DL
GFR SERPL CREATININE-BSD FRML MDRD: 82 ML/MIN/1.73M*2
GLUCOSE SERPL-MCNC: 139 MG/DL (ref 74–99)
HBA1C MFR BLD: 6.5 %
HCT VFR BLD AUTO: 38.9 % (ref 36–46)
HDLC SERPL-MCNC: 70.8 MG/DL
HGB BLD-MCNC: 11.8 G/DL (ref 12–16)
IMM GRANULOCYTES # BLD AUTO: 0.02 X10*3/UL (ref 0–0.7)
IMM GRANULOCYTES NFR BLD AUTO: 0.3 % (ref 0–0.9)
LDLC SERPL CALC-MCNC: 62 MG/DL
LYMPHOCYTES # BLD AUTO: 2.27 X10*3/UL (ref 1.2–4.8)
LYMPHOCYTES NFR BLD AUTO: 29.9 %
MCH RBC QN AUTO: 27.3 PG (ref 26–34)
MCHC RBC AUTO-ENTMCNC: 30.3 G/DL (ref 32–36)
MCV RBC AUTO: 90 FL (ref 80–100)
MONOCYTES # BLD AUTO: 0.53 X10*3/UL (ref 0.1–1)
MONOCYTES NFR BLD AUTO: 7 %
NEUTROPHILS # BLD AUTO: 4.36 X10*3/UL (ref 1.2–7.7)
NEUTROPHILS NFR BLD AUTO: 57.3 %
NON HDL CHOLESTEROL: 85 MG/DL (ref 0–149)
NRBC BLD-RTO: 0 /100 WBCS (ref 0–0)
PLATELET # BLD AUTO: 385 X10*3/UL (ref 150–450)
POTASSIUM SERPL-SCNC: 4 MMOL/L (ref 3.5–5.3)
PROT SERPL-MCNC: 7.3 G/DL (ref 6.4–8.2)
RBC # BLD AUTO: 4.33 X10*6/UL (ref 4–5.2)
SODIUM SERPL-SCNC: 142 MMOL/L (ref 136–145)
TRIGL SERPL-MCNC: 114 MG/DL (ref 0–149)
VLDL: 23 MG/DL (ref 0–40)
WBC # BLD AUTO: 7.6 X10*3/UL (ref 4.4–11.3)

## 2023-12-20 PROCEDURE — 80061 LIPID PANEL: CPT

## 2023-12-20 PROCEDURE — 83036 HEMOGLOBIN GLYCOSYLATED A1C: CPT

## 2023-12-20 PROCEDURE — 85025 COMPLETE CBC W/AUTO DIFF WBC: CPT

## 2023-12-20 PROCEDURE — 80053 COMPREHEN METABOLIC PANEL: CPT

## 2023-12-20 PROCEDURE — 36415 COLL VENOUS BLD VENIPUNCTURE: CPT

## 2024-01-19 ENCOUNTER — HOSPITAL ENCOUNTER (OUTPATIENT)
Dept: RADIOLOGY | Facility: CLINIC | Age: 69
Discharge: HOME | End: 2024-01-19
Payer: MEDICARE

## 2024-01-19 VITALS — BODY MASS INDEX: 34.34 KG/M2 | WEIGHT: 193.78 LBS | HEIGHT: 63 IN

## 2024-01-19 DIAGNOSIS — Z12.31 ENCOUNTER FOR SCREENING MAMMOGRAM FOR MALIGNANT NEOPLASM OF BREAST: ICD-10-CM

## 2024-01-19 PROCEDURE — 77067 SCR MAMMO BI INCL CAD: CPT | Mod: BILATERAL PROCEDURE | Performed by: RADIOLOGY

## 2024-01-19 PROCEDURE — 77067 SCR MAMMO BI INCL CAD: CPT

## 2024-01-19 PROCEDURE — 77063 BREAST TOMOSYNTHESIS BI: CPT | Mod: BILATERAL PROCEDURE | Performed by: RADIOLOGY

## 2024-02-14 DIAGNOSIS — G44.89 OTHER HEADACHE SYNDROME: Primary | ICD-10-CM

## 2024-02-15 RX ORDER — GABAPENTIN 100 MG/1
100 CAPSULE ORAL
Qty: 450 CAPSULE | Refills: 0 | Status: SHIPPED | OUTPATIENT
Start: 2024-02-15 | End: 2024-05-17

## 2024-02-26 ENCOUNTER — OFFICE VISIT (OUTPATIENT)
Dept: NEUROLOGY | Facility: CLINIC | Age: 69
End: 2024-02-26
Payer: MEDICARE

## 2024-02-26 VITALS
HEART RATE: 73 BPM | HEIGHT: 63 IN | SYSTOLIC BLOOD PRESSURE: 119 MMHG | DIASTOLIC BLOOD PRESSURE: 70 MMHG | BODY MASS INDEX: 34.33 KG/M2

## 2024-02-26 DIAGNOSIS — G44.89 OTHER HEADACHE SYNDROME: Primary | ICD-10-CM

## 2024-02-26 DIAGNOSIS — G47.50 EXPLODING HEAD SYNDROME: ICD-10-CM

## 2024-02-26 DIAGNOSIS — R20.0 NUMBNESS OF TOES: ICD-10-CM

## 2024-02-26 PROCEDURE — 99215 OFFICE O/P EST HI 40 MIN: CPT | Performed by: PSYCHIATRY & NEUROLOGY

## 2024-02-26 PROCEDURE — 1126F AMNT PAIN NOTED NONE PRSNT: CPT | Performed by: PSYCHIATRY & NEUROLOGY

## 2024-02-26 PROCEDURE — 3074F SYST BP LT 130 MM HG: CPT | Performed by: PSYCHIATRY & NEUROLOGY

## 2024-02-26 PROCEDURE — 3008F BODY MASS INDEX DOCD: CPT | Performed by: PSYCHIATRY & NEUROLOGY

## 2024-02-26 PROCEDURE — 1036F TOBACCO NON-USER: CPT | Performed by: PSYCHIATRY & NEUROLOGY

## 2024-02-26 PROCEDURE — 3078F DIAST BP <80 MM HG: CPT | Performed by: PSYCHIATRY & NEUROLOGY

## 2024-02-26 NOTE — PATIENT INSTRUCTIONS
Because you have started to note a bit of numbness and tingling in the toes, which however may most likely be due to diabetes, I am sending you for a few blood test to look into other potential causes.  The office will call with results.    We discussed that your headaches are relatively infrequent but they are severe when they occur.  Since they are a nocturnal phenomenon, try increasing the bedtime dose of gabapentin to 400 mg, leaving the daytime doses at 100 mg each.  See if this works better.      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  This responds more reliably to topiramate than to gabapentin, and we could consider adding topiramate at bedtime.  However, as you prefer not to add any regular bedtime medication at this time, see how things go and if you change your mind, contact the office and we can try topiramate for this purpose.    Otherwise please see me in the office in 6 months.

## 2024-02-26 NOTE — PROGRESS NOTES
"Subjective     Dacia Babcock is a 68 y.o. year old female seen in follow-up for headache.    HPI    68-year-old left-handed woman with past medical history significant for hypertension, type 2 diabetes, hyperlipidemia, endometrial carcinoma status post hysterectomy, psoriasis with arthritis, asthma, anxiety, pancreatic cyst, GERD, remote prior headache history referred to as \"cluster, and a tentative diagnosis of temporal arteritis made 3 years ago without biopsy and for which she was treated with prednisone for only about 1 week.     I evaluated her initially as an inpatient consultation at [facility] on 3/24/2023. As detailed in my inpatient EMR note from that date she presented referred by her ophthalmologist after 2 weeks of headache, initially right temporal with subsequent involvement of the left temporal region. She describes stabbing pain in these regions. This was superimposed on underlying dull pain. She had been started on prednisone yet inflammatory markers drawn before prednisone initiation showed normal ESR of 30, marginally elevated CRP of 1.22 mg/dL.     I noted that her prior headache history sounded more like migraine than cluster. I recommended a contrasted brain MRI and initiation of gabapentin 300 mg twice daily. I recommended she stop prednisone as I did not suspect temporal arteritis and in any event she declined biopsy. The contrasted brain MRI showed a moderate burden of FLAIR white matter hyperintensities scattered throughout the cerebral hemispheres in a nonspecific pattern. Small left frontal calvarial osseous hemangioma. No abnormal enhancement.     I evaluated her again on 4/19/2023 by A/V visit. About 3 days after hospital discharge she tested positive for COVID, not requiring inpatient treatment. After about 6 days she was over the worst of it, and during those 6 days her headaches flared up considerably. However, since then her headaches had improved.     She tried " "gabapentin 300 mg twice daily but the morning dose made her too drowsy to function so she when I saw her last she was just taking 300 mg nightly. Since she got over the post hospital COVID she had noted no further stabbing pains.      I suggested adding 100-200 mg of gabapentin in the morning in addition to 300 mg at night.    I evaluated her most recently on 8/3/2023 at which visit she reported improvement in her headache pattern with only occasional, brief mild to moderate pains.  We discussed modifying her gabapentin regimen to 100 mg a.m., 100 mg in the late afternoon/early evening, and continuing 300 mg at bedtime.    She is evaluated again today in the office.    She notes headaches 2 or 3 days a month.  They typically occur at night, consistently in the right temporal region.  There is rapid rise to 8-9/10 intensity with an ice pick like pain in the right temple.  This is consistent with her previous headache pattern.    She continues on gabapentin 100 mg a.m. and afternoon and 300 mg nightly.  She is tolerating it well without noted side effects.  She previously had 300 mg capsules but currently takes the entire dose in the form of 100 mg capsules.    She gets an annual eye exam, most recently in the fall 2023, and does feel that her current spectacle prescription is not perfect.  She wears a bifocal and is supposed to wear it for driving but finds that she can see better driving without it.    She denies jaw claudication symptoms.  Occasionally she notes scalp tenderness, as before.    She chronically notes hissing tinnitus in the right ear for which she is followed by ENT.  She denies vertigo.    She notes a new complaint since the last visit of hearing a loud \"bang\" awakening her out of sleep in the middle of the night.  This is painless.  It occurs 2 or 3 times a month, similar to her headache frequency, but not synchronously with her headaches.  It is disconcerting but otherwise not associated with " discomfort.    She feels she may be starting to develop neuropathy.  She notes a bit of numbness in her toes intermittently, particularly the left third toe, more pronounced at night.  Also the toes tingle when she is lying in bed for the first 30 minutes, but not typically during the daytime hours.  She has been noting the symptoms just over the past month.  Her most recent hemoglobin A1c was 6.5% on 12/20/2023.    Review of Systems    As per the history of present illness    Patient Active Problem List   Diagnosis    Hyperlipemia, mixed    Insomnia    IPMN (intraductal papillary mucinous neoplasm)    Mild major depression, single episode (CMS/HCC)    Endometrial carcinoma (CMS/HCC)    Asthma    Benign essential hypertension    PA (psoriatic arthritis) (CMS/[facility])    Primary osteoarthritis of right knee    Psoriasis    Serous cystadenoma    Type 2 diabetes mellitus without complication (CMS/[facility])    Anxiety and depression    Encounter for Medicare annual wellness exam    Hospital discharge follow-up    Other headache syndrome    Diabetic polyneuropathy associated with type 2 diabetes mellitus (CMS/[facility])     Past Medical History:   Diagnosis Date    Candidal stomatitis 07/06/2021    Thrush    Erythema intertrigo 10/30/2020    Intertrigo    Essential (primary) hypertension     Benign essential hypertension    Other specified abnormal findings of blood chemistry 12/01/2021    D-dimer, elevated    Other specified diseases of pancreas 10/23/2019    Pancreatic mass    Other tear of medial meniscus, current injury, right knee, initial encounter 06/25/2021    Acute medial meniscus tear of right knee, initial encounter    Other tear of medial meniscus, current injury, right knee, subsequent encounter 12/04/2020    Tear of medial meniscus of right knee, current, unspecified tear type, subsequent encounter    Other tear of medial meniscus, current injury, unspecified knee, initial encounter 11/13/2020    Tear of medial meniscus  of knee    Personal history of neoplasm of uncertain behavior     History of neoplasm of uncertain behavior    Personal history of other diseases of the nervous system and sense organs 11/06/2015    History of impacted cerumen    Personal history of other diseases of the nervous system and sense organs 02/05/2020    History of tremor    Personal history of other diseases of the respiratory system     Personal history of asthma    Personal history of other endocrine, nutritional and metabolic disease     History of hypoglycemia    Personal history of other infectious and parasitic diseases 06/13/2015    History of herpes zoster    Personal history of other mental and behavioral disorders 10/02/2013    History of anxiety disorder    Personal history of other specified conditions 02/08/2014    History of wheezing    Plantar fascial fibromatosis 01/08/2019    Plantar fasciitis    Temporal arteritis (CMS/HCC) 02/24/2023    Trigger finger, unspecified finger 03/18/2022    Trigger finger, acquired    Trochanteric bursitis, left hip 06/23/2017    Trochanteric bursitis of left hip    Unspecified fracture of shaft of unspecified radius, initial encounter for closed fracture 04/02/2018    Fracture, radius, shaft    Unspecified internal derangement of right knee 08/24/2021    Internal derangement of right knee     Past Surgical History:   Procedure Laterality Date    APPENDECTOMY  10/02/2013    Appendectomy    BREAST LUMPECTOMY  10/02/2013    Breast Surgery Lumpectomy    CT ANGIO NECK  3/23/2023    CT NECK ANGIO W AND WO IV CONTRAST [facility]    CT HEAD ANGIO W AND WO IV CONTRAST  3/23/2023    CT HEAD ANGIO W AND WO IV CONTRAST [facility]    MR HEAD ANGIO WO IV CONTRAST  11/11/2020    MR HEAD ANGIO WO IV CONTRAST 11/11/2020 [facility]    OTHER SURGICAL HISTORY  10/02/2013    Anal Fissurectomy    OTHER SURGICAL HISTORY  10/22/2019    Hysterectomy    OTHER SURGICAL HISTORY  10/22/2019    Wrist fracture repair    OTHER SURGICAL  HISTORY  04/02/2021    Knee arthroscopy    OTHER SURGICAL HISTORY  04/02/2021    Salpingo-oophorectomy bilateral    TUBAL LIGATION  10/02/2013    Tubal Ligation     Social History     Tobacco Use    Smoking status: Never     Passive exposure: Never    Smokeless tobacco: Never   Substance Use Topics    Alcohol use: Not Currently     family history includes Alzheimer's disease in her mother; Brain cancer in her mother; Breast cancer in her maternal grandmother; Heart failure in her mother; Kidney cancer in her father and sister.    Current Outpatient Medications:     amLODIPine (Norvasc) 5 mg tablet, Take 1 tablet (5 mg) by mouth once daily. As directed, Disp: 90 tablet, Rfl: 1    aspirin 81 mg EC tablet, Take 1 tablet (81 mg) by mouth every other day., Disp: , Rfl:     atorvastatin (Lipitor) 10 mg tablet, Take 1 tablet (10 mg) by mouth once every day., Disp: 90 tablet, Rfl: 1    blood sugar diagnostic (Accu-Chek Guide test strips) strip, 1 strip once daily., Disp: , Rfl:     FreeStyle Shyla sensor system (FreeStyle Shyla 2 Sensor) kit, Use to test blood sugar daily, Disp: 6 each, Rfl: 3    gabapentin (Neurontin) 100 mg capsule, Take 1 capsule (100 mg) by mouth 5 times a day. Take 5 pills daily as follows: 100mg AM; 100mg evening; 300mg QHS, Disp: 450 capsule, Rfl: 0    glipiZIDE XL (Glucotrol XL) 2.5 mg 24 hr tablet, Take 1 tablet (2.5 mg) by mouth once daily with a meal. Do not crush, chew, or split., Disp: 90 tablet, Rfl: 3    ibuprofen 600 mg tablet, Take 1 tablet (600 mg) by mouth every 6 hours., Disp: , Rfl:     metFORMIN  mg 24 hr tablet, TAKE TWO TABLETS BY MOUTH TWO TIMES A DAY, Disp: 360 tablet, Rfl: 1    nystatin (Mycostatin) cream, Apply 2-3 times daily to affected areas, Disp: , Rfl:     olmesartan-hydrochlorothiazide (BENIcar HCT) 40-25 mg tablet, TAKE ONE TABLET BY MOUTH EVERY DAY., Disp: 90 tablet, Rfl: 0    omeprazole (PriLOSEC) 20 mg DR capsule, Take 1 capsule (20 mg) by mouth 2 times a day.,  Disp: , Rfl:     sertraline (Zoloft) 25 mg tablet, Take 1 tablet (25 mg) by mouth once every day., Disp: 90 tablet, Rfl: 1    triamcinolone (Kenalog) 0.025 % ointment, Apply to affected areas on trunk twice a day avoid face and groin, Disp: , Rfl:   Allergies   Allergen Reactions    Amoxicillin-Pot Clavulanate Unknown    Calcipotriene Unknown    Ephedrine Other    Propoxyphene-Acetaminophen Itching and Rash       Objective   Neurological Exam  Physical Exam    Physical Examination:    General: Alert woman who was ambulatory without assistive devices.      Cardiovascular: Warm feet without discoloration or edema.  Strong symmetric dorsalis pedis pulses.    Mental Status: Clear sensorium without fluctuation.  Appropriate in conversation.  Fluent unremarkable speech without paraphasic errors or hesitancy.    Cranial Nerves:  Funduscopic exam was not well visualized bilaterally on nondilated exam.  Pupils were equal, round and reactive to light with no relative afferent pupillary defect.  Extraocular movements were intact and conjugate without nystagmus.  No ptosis.  Visual fields were full to confrontation tested binocularly.  Facial sensation was asymmetric to pin over V2, marginally diminished right versus left.  Facial motor function was symmetrically intact.  Hearing was grossly intact.  No dysarthria.  Shoulder shrug was symmetric.  Tongue protrusion was midline.    Motor: Muscle bulk and tone were normal throughout. There was no pronator drift or asymmetry of finger taps. Confrontation strength was symmetrically 5/5 throughout the upper and lower extremities with the exception of 4+ right hip flexion which she related to history of surgery.     Coordination: There was no postural or rest tremor, myoclonus or dystonic posturing.    Tendon Reflexes: Ankle jerks were symmetrically 2+.    Sensation: Pin was diminished over the left second toe and sharp over the remaining toes.  There was no stocking gradient to pin.   "Vibration thresholds were normal at the great toes with several seconds of vibration perception bilaterally.  Romberg sign was absent.    Station: Intact and stable.    Gait: Stable and unremarkable.  Intact tandem.      Assessment/Plan     She presents with a number of complaints and concerns today.    With regard to headache, she reports a low frequency of 2-3 headaches per month but they are intense.  Duration is variable.  She is reluctant to aggressively titrate gabapentin.  Since the headaches tend to occur in the middle of the night I suggested increasing the bedtime dose to 400 mg.  We will leave the daytime doses at 100 mg as she is very wary of the possibility of being oversedated, which happened when she tried a 300 mg daytime dose.  Abortively she will continue taking ibuprofen if necessary.    I discussed the sensory symptoms in the toes.  There is not much definite evidence of polyneuropathy on exam albeit there is focal reduction to pin over the left second toe.  I discussed that if she does have a very mild/incipient neuropathy, which may be a strictly small fiber process at this point, it is most likely related to diabetes.  That said, her glycemic control has been good with hemoglobin A1c of 6.5 most recently.  We discussed the importance of continuing to be conscientious about glycemic control.    Additionally I recommended checking a few labs pertinent to neuropathy including B12, GARCIA panel and SIEP.  The office will contact her with results.    She is not at this point endorsing neuropathic pain to warrant medication but is already on gabapentin at bedtime.    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  I discussed that this condition is reported often to be responsive to topiramate, and she is familiar with topiramate having taken it years ago for headache (without efficacy).  She asked " whether she could take topiramate on an as-needed basis and I discussed that that would not work for unpredictable episodes occurring in the middle of the night out of sleep.  She does not want to commit to a nightly dose and as such we mutually decided to hold off on starting topiramate or other new medication for this condition.  If the exploding head syndrome symptoms become bad enough that she would like to pursue more specific treatment I advised her to contact the office.    Otherwise I recommended she follow-up in 6 months.

## 2024-02-27 ENCOUNTER — LAB (OUTPATIENT)
Dept: LAB | Facility: LAB | Age: 69
End: 2024-02-27
Payer: MEDICARE

## 2024-02-27 DIAGNOSIS — R20.0 NUMBNESS OF TOES: ICD-10-CM

## 2024-02-27 LAB
PROT SERPL-MCNC: 6.7 G/DL (ref 6.4–8.2)
VIT B12 SERPL-MCNC: 266 PG/ML (ref 211–911)

## 2024-02-27 PROCEDURE — 84165 PROTEIN E-PHORESIS SERUM: CPT | Performed by: PSYCHIATRY & NEUROLOGY

## 2024-02-27 PROCEDURE — 86235 NUCLEAR ANTIGEN ANTIBODY: CPT

## 2024-02-27 PROCEDURE — 82607 VITAMIN B-12: CPT

## 2024-02-27 PROCEDURE — 84155 ASSAY OF PROTEIN SERUM: CPT

## 2024-02-27 PROCEDURE — 86320 SERUM IMMUNOELECTROPHORESIS: CPT | Performed by: PSYCHIATRY & NEUROLOGY

## 2024-02-27 PROCEDURE — 84165 PROTEIN E-PHORESIS SERUM: CPT

## 2024-02-27 PROCEDURE — 36415 COLL VENOUS BLD VENIPUNCTURE: CPT

## 2024-02-27 PROCEDURE — 86038 ANTINUCLEAR ANTIBODIES: CPT

## 2024-02-27 PROCEDURE — 86334 IMMUNOFIX E-PHORESIS SERUM: CPT

## 2024-02-27 PROCEDURE — 86225 DNA ANTIBODY NATIVE: CPT

## 2024-02-28 DIAGNOSIS — I10 BENIGN ESSENTIAL HYPERTENSION: ICD-10-CM

## 2024-02-28 LAB
ANA PATTERN: ABNORMAL
ANA SER QL HEP2 SUBST: POSITIVE
ANA TITR SER IF: ABNORMAL {TITER}
CENTROMERE B AB SER-ACNC: <0.2 AI
CHROMATIN AB SERPL-ACNC: <0.2 AI
DSDNA AB SER-ACNC: <1 IU/ML
ENA JO1 AB SER QL IA: <0.2 AI
ENA RNP AB SER IA-ACNC: <0.2 AI
ENA SCL70 AB SER QL IA: <0.2 AI
ENA SM AB SER IA-ACNC: <0.2 AI
ENA SM+RNP AB SER QL IA: <0.2 AI
ENA SS-A AB SER IA-ACNC: <0.2 AI
ENA SS-B AB SER IA-ACNC: <0.2 AI
RIBOSOMAL P AB SER-ACNC: <0.2 AI

## 2024-02-28 RX ORDER — OLMESARTAN MEDOXOMIL AND HYDROCHLOROTHIAZIDE 40/25 40; 25 MG/1; MG/1
1 TABLET ORAL DAILY
Qty: 90 TABLET | Refills: 0 | Status: SHIPPED | OUTPATIENT
Start: 2024-02-28 | End: 2024-05-28

## 2024-02-28 NOTE — TELEPHONE ENCOUNTER
Next OV 3/1/24   Requested Prescriptions     Pending Prescriptions Disp Refills    olmesartan-hydrochlorothiazide (BENIcar HCT) 40-25 mg tablet 90 tablet 0     Sig: Take 1 tablet by mouth once daily.

## 2024-02-29 LAB
ALBUMIN: 3.4 G/DL (ref 3.4–5)
ALPHA 1 GLOBULIN: 0.3 G/DL (ref 0.2–0.6)
ALPHA 2 GLOBULIN: 0.8 G/DL (ref 0.4–1.1)
BETA GLOBULIN: 1 G/DL (ref 0.5–1.2)
GAMMA GLOBULIN: 1.2 G/DL (ref 0.5–1.4)
IMMUNOFIXATION COMMENT: NORMAL
PATH REVIEW - SERUM IMMUNOFIXATION: NORMAL
PATH REVIEW-SERUM PROTEIN ELECTROPHORESIS: NORMAL
PROTEIN ELECTROPHORESIS COMMENT: NORMAL

## 2024-03-01 ENCOUNTER — OFFICE VISIT (OUTPATIENT)
Dept: PRIMARY CARE | Facility: CLINIC | Age: 69
End: 2024-03-01
Payer: MEDICARE

## 2024-03-01 VITALS
RESPIRATION RATE: 16 BRPM | DIASTOLIC BLOOD PRESSURE: 70 MMHG | SYSTOLIC BLOOD PRESSURE: 122 MMHG | HEIGHT: 64 IN | OXYGEN SATURATION: 96 % | TEMPERATURE: 97.5 F | HEART RATE: 73 BPM | BODY MASS INDEX: 32.3 KG/M2 | WEIGHT: 189.2 LBS

## 2024-03-01 DIAGNOSIS — C54.1 ENDOMETRIAL CARCINOMA (MULTI): ICD-10-CM

## 2024-03-01 DIAGNOSIS — M19.90 GENERALIZED ARTHRITIS: ICD-10-CM

## 2024-03-01 DIAGNOSIS — I10 BENIGN ESSENTIAL HYPERTENSION: ICD-10-CM

## 2024-03-01 DIAGNOSIS — Z11.59 SCREENING FOR VIRAL DISEASE: ICD-10-CM

## 2024-03-01 DIAGNOSIS — E78.2 HYPERLIPEMIA, MIXED: ICD-10-CM

## 2024-03-01 DIAGNOSIS — L40.50 PA (PSORIATIC ARTHRITIS) (MULTI): ICD-10-CM

## 2024-03-01 DIAGNOSIS — Z00.00 MEDICARE ANNUAL WELLNESS VISIT, SUBSEQUENT: Primary | ICD-10-CM

## 2024-03-01 DIAGNOSIS — E11.9 TYPE 2 DIABETES MELLITUS WITHOUT COMPLICATION, WITHOUT LONG-TERM CURRENT USE OF INSULIN (MULTI): ICD-10-CM

## 2024-03-01 DIAGNOSIS — Z78.0 MENOPAUSE: ICD-10-CM

## 2024-03-01 DIAGNOSIS — E11.42 DIABETIC POLYNEUROPATHY ASSOCIATED WITH TYPE 2 DIABETES MELLITUS (MULTI): ICD-10-CM

## 2024-03-01 DIAGNOSIS — F32.0 MILD MAJOR DEPRESSION, SINGLE EPISODE (CMS-HCC): ICD-10-CM

## 2024-03-01 DIAGNOSIS — Z00.00 ANNUAL PHYSICAL EXAM: ICD-10-CM

## 2024-03-01 PROCEDURE — 3008F BODY MASS INDEX DOCD: CPT | Performed by: FAMILY MEDICINE

## 2024-03-01 PROCEDURE — 1160F RVW MEDS BY RX/DR IN RCRD: CPT | Performed by: FAMILY MEDICINE

## 2024-03-01 PROCEDURE — 1159F MED LIST DOCD IN RCRD: CPT | Performed by: FAMILY MEDICINE

## 2024-03-01 PROCEDURE — 99214 OFFICE O/P EST MOD 30 MIN: CPT | Performed by: FAMILY MEDICINE

## 2024-03-01 PROCEDURE — 3078F DIAST BP <80 MM HG: CPT | Performed by: FAMILY MEDICINE

## 2024-03-01 PROCEDURE — 1036F TOBACCO NON-USER: CPT | Performed by: FAMILY MEDICINE

## 2024-03-01 PROCEDURE — 1126F AMNT PAIN NOTED NONE PRSNT: CPT | Performed by: FAMILY MEDICINE

## 2024-03-01 PROCEDURE — G0439 PPPS, SUBSEQ VISIT: HCPCS | Performed by: FAMILY MEDICINE

## 2024-03-01 PROCEDURE — 1158F ADVNC CARE PLAN TLK DOCD: CPT | Performed by: FAMILY MEDICINE

## 2024-03-01 PROCEDURE — 3074F SYST BP LT 130 MM HG: CPT | Performed by: FAMILY MEDICINE

## 2024-03-01 PROCEDURE — 1170F FXNL STATUS ASSESSED: CPT | Performed by: FAMILY MEDICINE

## 2024-03-01 PROCEDURE — 99397 PER PM REEVAL EST PAT 65+ YR: CPT | Performed by: FAMILY MEDICINE

## 2024-03-01 PROCEDURE — 1123F ACP DISCUSS/DSCN MKR DOCD: CPT | Performed by: FAMILY MEDICINE

## 2024-03-01 RX ORDER — METFORMIN HYDROCHLORIDE 500 MG/1
1000 TABLET, EXTENDED RELEASE ORAL 2 TIMES DAILY
Qty: 360 TABLET | Refills: 1 | Status: SHIPPED | OUTPATIENT
Start: 2024-03-01

## 2024-03-01 RX ORDER — ATORVASTATIN CALCIUM 10 MG/1
10 TABLET, FILM COATED ORAL
Qty: 90 TABLET | Refills: 1 | Status: SHIPPED | OUTPATIENT
Start: 2024-03-01

## 2024-03-01 RX ORDER — AMLODIPINE BESYLATE 5 MG/1
5 TABLET ORAL DAILY
Qty: 90 TABLET | Refills: 1 | Status: SHIPPED | OUTPATIENT
Start: 2024-03-01 | End: 2024-05-30

## 2024-03-01 RX ORDER — SERTRALINE HYDROCHLORIDE 25 MG/1
25 TABLET, FILM COATED ORAL
Qty: 90 TABLET | Refills: 1 | Status: SHIPPED | OUTPATIENT
Start: 2024-03-01

## 2024-03-01 ASSESSMENT — ACTIVITIES OF DAILY LIVING (ADL)
MANAGING_FINANCES: INDEPENDENT
TAKING_MEDICATION: INDEPENDENT
BATHING: INDEPENDENT
GROCERY_SHOPPING: INDEPENDENT
DOING_HOUSEWORK: INDEPENDENT
DRESSING: INDEPENDENT

## 2024-03-01 NOTE — ASSESSMENT & PLAN NOTE
Checking labs  Schedule Dexa  Colonoscopy and mammogram are UTD  Declines immunizations  Reviewed diet and exercise.

## 2024-03-01 NOTE — ASSESSMENT & PLAN NOTE
Stable  Refilling meds  Checking labs  Eye and foot exam UTD  Continue care per endo  Recheck in 3 months

## 2024-03-01 NOTE — PROGRESS NOTES
Subjective   Reason for Visit: Dacia Babcock is an 68 y.o. female here for a Medicare Wellness visit.     Past Medical, Surgical, and Family History reviewed and updated in chart.    Reviewed all medications by prescribing practitioner or clinical pharmacist (such as prescriptions, OTCs, herbal therapies and supplements) and documented in the medical record.  Medicare Wellness Billing Compliance Satisfied    *This is a visual tool to show completion of required items on the day of the visit. Green checks will only appear on the date of visit.    Review all medications by prescribing practitioner or clinical pharmacist (such as prescriptions, OTCs, herbal therapies and supplements) documented in the medical record    Past Medical, Surgical, and Family History reviewed and updated in chart    Tobacco Use Reviewed    Alcohol Use Reviewed    Illicit Drug Use Reviewed    PHQ2/9    Falls in Last Year Reviewed    Home Safety Risk Factors Reviewed    Cognitive Impairment Reviewed    Patient Self Assessment and Health Status    Current Diet Reviewed    Exercise Frequency    ADL - Hearing Impairment    ADL - Bathing    ADL - Dressing    ADL - Walks in Home    IADL - Managing Finances    IADL - Grocery Shopping    IADL - Taking Medications    IADL - Doing Housework      Here for general check and wellness exams.  Seeing endo, neurology, heme/onc, pancrease and gyn.    In general doing well.  Taking medications daily and tolerating well.  Neurontin increased per neurology    Admits to worsening arthritis  Had labs per neurology  NASREEN positive and elevated titer.  Has psoriatic arthritis and has seen Rheumatology but declined returning.   Has hx of temporal arteritis.     Still working and teaching.   Sleeping well  Seeing dentist.  Hearing well.     Home BS has been good  Occ spikes  Seeing endo at the beginning of April  Diet has been good  Weight is decreasing  Exercise walking   Difficulty with  "stairs.  No falls.     Home PB checks are good  Denies chest pain, shortness of breath, lightheaded, dizziness.   Occ of balance.     Pap- Hysterectomy  Mammogram 1/24  Dexa 1/20  Colonoscopy 11/19  Flu- Declined  Last labs were at goal.    Due for labs at the end of March.  Due for bone density.  Mammogram up-to-date    Eye exams and foot exams yearly      Patient Care Team:  Maria G Butler Pla, DO as PCP - General  Maria G Butler Pla, DO as PCP - [employer] PCP  Alicia Friedman, RN as Care Manager (Case Management)     Review of Systems    Objective   Vitals:  /70   Pulse 73   Temp 36.4 °C (97.5 °F)   Resp 16   Ht 1.626 m (5' 4\")   Wt 85.8 kg (189 lb 3.2 oz)   SpO2 96%   BMI 32.48 kg/m²       Physical Exam  Vitals and nursing note reviewed.   Constitutional:       Appearance: Normal appearance.   HENT:      Head: Normocephalic.      Right Ear: Tympanic membrane normal.      Left Ear: Tympanic membrane normal.      Nose: Nose normal.      Mouth/Throat:      Mouth: Mucous membranes are dry.   Eyes:      Extraocular Movements: Extraocular movements intact.      Pupils: Pupils are equal, round, and reactive to light.   Cardiovascular:      Rate and Rhythm: Normal rate and regular rhythm.      Pulses: Normal pulses.   Pulmonary:      Effort: Pulmonary effort is normal.      Breath sounds: Normal breath sounds.   Abdominal:      General: Abdomen is flat. Bowel sounds are normal.      Palpations: Abdomen is soft.   Musculoskeletal:         General: Normal range of motion.      Cervical back: Normal range of motion.   Skin:     General: Skin is warm and dry.   Neurological:      General: No focal deficit present.      Mental Status: She is alert and oriented to person, place, and time.   Psychiatric:         Mood and Affect: Mood normal.         Behavior: Behavior normal.         Thought Content: Thought content normal.         Judgment: Judgment normal.       Assessment/Plan   Problem List " Items Addressed This Visit       Hyperlipemia, mixed    Relevant Orders    Comprehensive Metabolic Panel    Lipid Panel    Benign essential hypertension    Relevant Orders    CBC and Auto Differential    Type 2 diabetes mellitus without complication (CMS/HCC)    Relevant Orders    Hemoglobin A1C    Medicare annual wellness visit, subsequent - Primary    Annual physical exam     Other Visit Diagnoses       Screening for viral disease        Relevant Orders    Hepatitis C Antibody    Menopause        Relevant Orders    XR DEXA bone density

## 2024-03-01 NOTE — ASSESSMENT & PLAN NOTE
Stable   Refilling meds at the same dose  Checking labs   Continue home BP checks  Recheck in 3 months

## 2024-03-04 ENCOUNTER — HOSPITAL ENCOUNTER (OUTPATIENT)
Dept: RADIOLOGY | Facility: CLINIC | Age: 69
Discharge: HOME | End: 2024-03-04
Payer: MEDICARE

## 2024-03-04 DIAGNOSIS — Z78.0 MENOPAUSE: ICD-10-CM

## 2024-03-06 ENCOUNTER — APPOINTMENT (OUTPATIENT)
Dept: RADIOLOGY | Facility: CLINIC | Age: 69
End: 2024-03-06
Payer: MEDICARE

## 2024-03-12 ENCOUNTER — HOSPITAL ENCOUNTER (OUTPATIENT)
Dept: RADIOLOGY | Facility: CLINIC | Age: 69
Discharge: HOME | End: 2024-03-12
Payer: MEDICARE

## 2024-03-12 PROCEDURE — 77080 DXA BONE DENSITY AXIAL: CPT

## 2024-03-18 ENCOUNTER — TELEPHONE (OUTPATIENT)
Dept: NEUROLOGY | Facility: CLINIC | Age: 69
End: 2024-03-18
Payer: MEDICARE

## 2024-03-18 DIAGNOSIS — G44.89 CHRONIC MIXED HEADACHE SYNDROME: Primary | ICD-10-CM

## 2024-03-18 RX ORDER — GABAPENTIN 300 MG/1
300 CAPSULE ORAL NIGHTLY
Qty: 90 CAPSULE | Refills: 0 | Status: SHIPPED | OUTPATIENT
Start: 2024-03-18

## 2024-03-18 NOTE — TELEPHONE ENCOUNTER
Patient stated she bent over and have been having pain top of head and behind eyes/ have taken Advil with only little relief

## 2024-03-20 ENCOUNTER — OFFICE VISIT (OUTPATIENT)
Dept: ENDOCRINOLOGY | Facility: HOSPITAL | Age: 69
End: 2024-03-20
Payer: MEDICARE

## 2024-03-20 VITALS
WEIGHT: 190 LBS | HEART RATE: 73 BPM | HEIGHT: 64 IN | SYSTOLIC BLOOD PRESSURE: 107 MMHG | DIASTOLIC BLOOD PRESSURE: 74 MMHG | TEMPERATURE: 97 F | OXYGEN SATURATION: 99 % | BODY MASS INDEX: 32.44 KG/M2

## 2024-03-20 DIAGNOSIS — G43.901 STATUS MIGRAINOSUS: Primary | ICD-10-CM

## 2024-03-20 DIAGNOSIS — E11.9 TYPE 2 DIABETES MELLITUS WITHOUT COMPLICATION, WITHOUT LONG-TERM CURRENT USE OF INSULIN (MULTI): Primary | ICD-10-CM

## 2024-03-20 DIAGNOSIS — E78.2 HYPERLIPEMIA, MIXED: ICD-10-CM

## 2024-03-20 LAB — GLUCOSE BLD MANUAL STRIP-MCNC: 161 MG/DL (ref 74–99)

## 2024-03-20 PROCEDURE — 3008F BODY MASS INDEX DOCD: CPT | Performed by: STUDENT IN AN ORGANIZED HEALTH CARE EDUCATION/TRAINING PROGRAM

## 2024-03-20 PROCEDURE — 1159F MED LIST DOCD IN RCRD: CPT | Performed by: STUDENT IN AN ORGANIZED HEALTH CARE EDUCATION/TRAINING PROGRAM

## 2024-03-20 PROCEDURE — 3074F SYST BP LT 130 MM HG: CPT | Performed by: STUDENT IN AN ORGANIZED HEALTH CARE EDUCATION/TRAINING PROGRAM

## 2024-03-20 PROCEDURE — 1036F TOBACCO NON-USER: CPT | Performed by: STUDENT IN AN ORGANIZED HEALTH CARE EDUCATION/TRAINING PROGRAM

## 2024-03-20 PROCEDURE — 1125F AMNT PAIN NOTED PAIN PRSNT: CPT | Performed by: STUDENT IN AN ORGANIZED HEALTH CARE EDUCATION/TRAINING PROGRAM

## 2024-03-20 PROCEDURE — 1123F ACP DISCUSS/DSCN MKR DOCD: CPT | Performed by: STUDENT IN AN ORGANIZED HEALTH CARE EDUCATION/TRAINING PROGRAM

## 2024-03-20 PROCEDURE — 99215 OFFICE O/P EST HI 40 MIN: CPT | Performed by: STUDENT IN AN ORGANIZED HEALTH CARE EDUCATION/TRAINING PROGRAM

## 2024-03-20 PROCEDURE — 3078F DIAST BP <80 MM HG: CPT | Performed by: STUDENT IN AN ORGANIZED HEALTH CARE EDUCATION/TRAINING PROGRAM

## 2024-03-20 PROCEDURE — 36416 COLLJ CAPILLARY BLOOD SPEC: CPT | Performed by: STUDENT IN AN ORGANIZED HEALTH CARE EDUCATION/TRAINING PROGRAM

## 2024-03-20 PROCEDURE — 1160F RVW MEDS BY RX/DR IN RCRD: CPT | Performed by: STUDENT IN AN ORGANIZED HEALTH CARE EDUCATION/TRAINING PROGRAM

## 2024-03-20 PROCEDURE — 82947 ASSAY GLUCOSE BLOOD QUANT: CPT | Performed by: STUDENT IN AN ORGANIZED HEALTH CARE EDUCATION/TRAINING PROGRAM

## 2024-03-20 RX ORDER — METHYLPREDNISOLONE 4 MG/1
TABLET ORAL
Qty: 21 TABLET | Refills: 0 | Status: SHIPPED | OUTPATIENT
Start: 2024-03-20 | End: 2024-03-27

## 2024-03-20 ASSESSMENT — PATIENT HEALTH QUESTIONNAIRE - PHQ9
2. FEELING DOWN, DEPRESSED OR HOPELESS: NOT AT ALL
SUM OF ALL RESPONSES TO PHQ9 QUESTIONS 1 AND 2: 0
1. LITTLE INTEREST OR PLEASURE IN DOING THINGS: NOT AT ALL

## 2024-03-20 ASSESSMENT — ENCOUNTER SYMPTOMS
LOSS OF SENSATION IN FEET: 1
OCCASIONAL FEELINGS OF UNSTEADINESS: 0
DEPRESSION: 0

## 2024-03-20 ASSESSMENT — PAIN SCALES - GENERAL: PAINLEVEL: 10-WORST PAIN EVER

## 2024-03-20 NOTE — PROGRESS NOTES
"Patient coming in for follow up for T2DM    Subjective   Dacia Babcock \"Naima\" is a 68 y.o. female who presents for follow up for Type 2 diabetes mellitus.   Lab Results   Component Value Date    HGBA1C 6.5 (H) 12/20/2023      Dacia Babcock is a 68 y.o. female with pmh of T2DM, Endometrial cancer, IPMN, HTN and HLD coming in for follow up of T2DM complicated by multiple yeast infections leading to discontinuation of jardiance     A1c down to 6.5% Dec 2023  Current meds:  Metformin 500 mg extended release 2 tablets twice daily  Glipizide to 2.5 mg daily at lunchtime which is largest meal of the day.If continues to have fluctuations and decreased energy we can stop glipizide and switch to januvia 50 mg daily    Interval hx:  Numbness on the tip on the 2nd and 3rd toes in the left  Last Thursday had severe headache started on Thursday called neurologist waiting for his reply. Associated with light sensitivity  Yesterday and today pain a little better  Working again   CGM INTERPRETATION:   Average blood glucose: 141 mg/dl.   CGM BG variety: 21.6 .   Consistent with current A1c: 6.5 %.  0% of time worn spend below 70mg/dL.    89 % of time worn spent at target 70-180mg/dL.    11 % of time worn spent above 180mg/dL.   Overall trend and \"glucose pattern insights\" reveals: no current concern for recurrent hypoglycemia, dinner shows post prandial hyperglycemia.     Overall glucose is generally stable near average.   Patient checks glucose 5 + times a day.   CGM data was used to influence glucose control treatment plan.    Minimum of 72 hours of data were reviewed.    Hypoglycemia frequency: Rare  Hypoglycemia awareness: Yes     Diet:  Breakfast: piece of toast and 1-2 eggs  Watching her diet  Diabetes Surveillance: Eye exam: Sept/October Sees optometrist at [facility] .   Foot exam/podiatrist:  No foot ulcers. Recent had sprained ankle  Follows with podiatry  No neuropathy  Cardiovascular: no coronary artery " "bypass graft and no coronary artery disease . On Atorvastatin 10 mg dailyand On ASA 81 mg daily LDL:62  Renal: no nephropathy and no end stage renal disease . UACR neg 2023 on olmesartan 40-HCTZ-25.   Norvasc for BP     Endometrial cancer  s/p radidx 2.5 years Has IPMNs being watched by [facility] and stable.  Has pancreatic lesions but no pancreatitis  Mother had acromegaly dx in her 60s  from Diabetes.       Review of Systems  all pertinent systems reviewed and are otherwise negative   Objective   /74 (BP Location: Right arm, Patient Position: Sitting, BP Cuff Size: Adult)   Pulse 73   Temp 36.1 °C (97 °F) (Temporal)   Ht 1.626 m (5' 4\")   Wt 86.2 kg (190 lb)   SpO2 99%   BMI 32.61 kg/m²   Physical Exam  Constitutional:       General: She is not in acute distress.     Appearance: Normal appearance.   Eyes:      Extraocular Movements: Extraocular movements intact.      Pupils: Pupils are equal, round, and reactive to light.   Cardiovascular:      Rate and Rhythm: Normal rate and regular rhythm.   Pulmonary:      Effort: Pulmonary effort is normal. No respiratory distress.      Breath sounds: Normal breath sounds.   Abdominal:      General: Bowel sounds are normal.      Palpations: Abdomen is soft.      Tenderness: There is no abdominal tenderness.   Skin:     Coloration: Skin is not jaundiced or pale.      Findings: No erythema or rash.   Neurological:      General: No focal deficit present.      Mental Status: She is alert and oriented to person, place, and time.      Deep Tendon Reflexes: Reflexes normal.   Psychiatric:         Mood and Affect: Mood normal.         Behavior: Behavior normal.         Lab Review  Glucose (mg/dL)   Date Value   2023 139 (H)   09/15/2023 116 (H)   2023 130 (H)   2023 77     Hemoglobin A1C (%)   Date Value   2023 6.5 (H)   09/15/2023 6.4 (A)   2023 6.7 (A)   2023 6.9 (A)     Bicarbonate (mmol/L)   Date Value   2023 27 " "  09/15/2023 27   06/14/2023 25   03/31/2023 28     Urea Nitrogen (mg/dL)   Date Value   12/20/2023 30 (H)   09/15/2023 23   06/14/2023 26 (H)   03/31/2023 16     Creatinine (mg/dL)   Date Value   12/20/2023 0.78   09/15/2023 0.80   06/14/2023 0.78   03/31/2023 0.96     Lab Results   Component Value Date    CHOL 156 12/20/2023    CHOL 144 09/15/2023    CHOL 180 03/07/2023     Lab Results   Component Value Date    HDL 70.8 12/20/2023    HDL 58.5 09/15/2023    HDL 70.8 03/07/2023     Lab Results   Component Value Date    LDLCALC 62 12/20/2023     Lab Results   Component Value Date    TRIG 114 12/20/2023    TRIG 120 09/15/2023    TRIG 119 03/07/2023     No components found for: \"CHOLHDL\"   Lab Results   Component Value Date    TSH 1.03 12/02/2022       Assessment/Plan   Dacia Babcock is a 68 y.o. female with pmh of T2DM, Endometrial cancer, IPMN, HTN and HLD coming in for follow up of T2DM complicated by multiple yeast infections leading to discontinuation of jardiance  A1c down to 6.5% Dec 2023  Current meds:  Metformin 500 mg extended release 2 tablets twice daily  Glipizide to 2.5 mg daily at lunchtime which is largest meal of the day.If continues to have fluctuations and decreased energy we can stop glipizide and switch to januvia 50 mg daily  Interval hx:  Numbness on the tip on the 2nd and 3rd toes in the left  Last Thursday had severe headache started on Thursday called neurologist waiting for his reply. Associated with light sensitivity  Yesterday and today pain a little better  Working again   Average blood glucose: 141 mg/dl.   Overall trend and \"glucose pattern insights\" reveals: no current concern for recurrent hypoglycemia  Diabetes Surveillance: Eye exam: Sept/October Sees optometrist at [facility] .   Foot exam/podiatrist:  No foot ulcers. Recent had sprained ankle  Follows with podiatry  No neuropathy  Cardiovascular: no coronary artery bypass graft and no coronary artery disease . On " Atorvastatin 10 mg dailyand On ASA 81 mg daily LDL:62  Renal: no nephropathy and no end stage renal disease . UACR neg March 2023 on olmesartan 40-HCTZ-25.   Norvasc for BP     Plan:  Continue Metformin 500 mg extended release 2 tablets twice daily  Continue Glipizide to 2.5 mg daily at lunchtime which is largest meal of the day.  Blood work in 6 months  Continue Atorvastatin  Continue Olmesartan-hydrochlorothiazide  Follow with ophtahlmology and podiatry  Follow with neurology    Blood work before next apt    RTC in 6 months  Problem List Items Addressed This Visit       Hyperlipemia, mixed    Relevant Orders    Lipid Panel    Type 2 diabetes mellitus without complication (CMS/HCC) - Primary    Relevant Orders    Albumin , Urine Random    Lipid Panel    Hemoglobin A1C    Renal Function Panel

## 2024-03-20 NOTE — PATIENT INSTRUCTIONS
Continue Metformin 500 mg extended release 2 tablets twice daily  Continue Glipizide to 2.5 mg daily at lunchtime which is largest meal of the day.  Blood work in 6 months  Continue Atorvastatin  Continue Olmesartan-hydrochlorothiazide  Follow with ophtahlmology and podiatry  Follow with neurology    Blood work before next apt    RTC in 6 months

## 2024-05-17 DIAGNOSIS — G44.89 OTHER HEADACHE SYNDROME: ICD-10-CM

## 2024-05-17 RX ORDER — GABAPENTIN 100 MG/1
100 CAPSULE ORAL
Qty: 450 CAPSULE | Refills: 0 | Status: SHIPPED | OUTPATIENT
Start: 2024-05-17

## 2024-05-22 ENCOUNTER — APPOINTMENT (OUTPATIENT)
Dept: RHEUMATOLOGY | Facility: CLINIC | Age: 69
End: 2024-05-22
Payer: MEDICARE

## 2024-05-28 DIAGNOSIS — I10 BENIGN ESSENTIAL HYPERTENSION: ICD-10-CM

## 2024-05-28 RX ORDER — OLMESARTAN MEDOXOMIL AND HYDROCHLOROTHIAZIDE 40/25 40; 25 MG/1; MG/1
1 TABLET ORAL DAILY
Qty: 90 TABLET | Refills: 0 | Status: SHIPPED | OUTPATIENT
Start: 2024-05-28

## 2024-06-07 ENCOUNTER — APPOINTMENT (OUTPATIENT)
Dept: PRIMARY CARE | Facility: CLINIC | Age: 69
End: 2024-06-07
Payer: MEDICARE

## 2024-06-12 ENCOUNTER — TELEPHONE (OUTPATIENT)
Dept: PRIMARY CARE | Facility: CLINIC | Age: 69
End: 2024-06-12
Payer: MEDICARE

## 2024-06-12 NOTE — TELEPHONE ENCOUNTER
Pt's handicap placard is going to . She is wondering if you are able to put in a new script for this?

## 2024-06-20 DIAGNOSIS — E11.9 TYPE 2 DIABETES MELLITUS WITHOUT COMPLICATION, WITHOUT LONG-TERM CURRENT USE OF INSULIN (MULTI): ICD-10-CM

## 2024-06-20 RX ORDER — FLASH GLUCOSE SENSOR
KIT MISCELLANEOUS
Refills: 0 | OUTPATIENT
Start: 2024-06-20

## 2024-06-25 NOTE — TELEPHONE ENCOUNTER
PT CALLED STATING SHE GOT ONE BOX OF THE JYOTI FREE STYLE SENSOR KIT AND ONE BOX LASTS 14 DAYS, HER INSURANCE REQUIRES 90 DAY SUPPLIES. PLEASE RESEND TO THE [facility] IN [place].

## 2024-06-26 NOTE — PROGRESS NOTES
Patient ID: Naima Babcock is a 68 y.o. female.    Subjective    HPI    Referred by Dr. Reddy  PCP is Dr. Maria G Todda     63-year-old with history of endometrial cancer, diagnosed August 2019.     mass history  - 6/19/19 CT abd/pelvis - Heterogeneous appearance of the uterus with suspicion of a myometrial mass. This is not well evaluated with current CT. Pelvic ultrasound is recommended  for further evaluation since endometrial neoplasm cannot be excluded. 2. Numerous cystic changes in the pancreas, one in the pancreatic head and one in the pancreatic tail. Differential diagnosis includes changes from chronic pancreatitis however serous  cystic neoplastic changes cannot be excluded. Correlate with the patient's clinical history, MRI/MRCP can be obtained for further evaluation/characterization if clinically indicated. 3. Mild steatosis hepatis. 4. Probable renal cysts.   -7/3/19 TVUS - The retroflexed uterus measured 7.2 x 3.8 x 4.9 cm. There was heterogeneous myometrium. There was an anterior corpus/fundal hypoechoic solid mass measuring 20 x 13 x 10 mm. There was an additional anterior hypoechoic myometrial nodule measuring  12 x 10 x 10 mm.. The endometrial lining was heterogeneous and thickened with multiple cystic changes. It measured up to 11 mm in AP thickness. There was trace cervical canal fluid... RIGHT OVARY: The right ovary measured 12 x 24 x 16 mm. There was no  suspicious solid or cystic right ovarian lesion. Color Doppler and spectral Doppler showed preserved arterial and venous blood flow in the right ovary. LEFT OVARY: The left ovary measured 20 x 13 x 15 mm. There was no suspicious solid or cystic left ovarian  lesion. Color Doppler and spectral Doppler showed preserved arterial and venous blood flow in the left ovary.  - 07/29/2019, D&C.  Pathology reported for atypical mucinous proliferation cannot exclude mucinous endometrial cancer  - 08/09/2019, case presented at GYN tumor board.   Impression was atypical mucinous proliferation concerning for mucinous endometrial cancer.  Recommendations were for hysterectomy.   - 2019 TLH/BSO sentinel pelvic lymph node dissection.  Final pathology reported as a grade 1 mucinous endometrial cancer with no myometrial invasion.  Negative sentinel lymph nodes.  Mismatch repair testing was normal.  - 2019 -case presented at GYN tumor board.  Impression was stage IA mucinous endometrial cancer/  recommendations were for surveillance, optec, survivorship   - 2021 - biopsy of vaginal lesion, benign     Interval History: Dacia is a 68 year old female s/p TLH/BSO sentinel pelvic lymph node dissection for Stage IA mucinous endometrial cancer on 19. Patient denies any vaginal bleeding or abnormal discharge. Appetite has been good. Energy levels are baseline. Patient denies urinary leakage or incontinence. She denies any urgency or frequency. Denies any weight loss or weight gain. Does not report any abdominal pain or bloating besides discomfort related to constipation. Occasional RLQ cramping is stable. She moved in with her daughter and is doing well since  from her partner.     Medical History:  Psoriasis  Arthritis  Diabetes  Hypertension     Surgical History:  Tubal ligation   Appendectomy   Breast lumpectomy   Orthopedic surgery, right wrist 2018     Social History:  Denies tobacco, alcohol or illicit drug use.  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  She is . Her  is  of Alzheimer's. She lives with her daughter.     Obstetrics/Gynecology History:  G3, P2,  ×2. SAB ×1  Menopausal since .  Denies history of prior abnormal Pap smears.  Denies postmenopausal bleeding. Denies gynecologic problems.     Family History:  2 sisters had renal cell carcinoma.  There are no family members with breast, uterine, ovarian or colon malignancy    Objective    BSA: 1.95 meters  squared  /76 (BP Location: Other (Comment), Patient Position: Sitting, BP Cuff Size: Adult) Comment (BP Location): wrist  Pulse 69   Temp 36.7 °C (98.1 °F) (Core)   Resp 18   Wt 84.6 kg (186 lb 6.4 oz)   SpO2 97%   BMI 32.00 kg/m²      Physical Exam  Vitals and nursing note reviewed.   Constitutional:       Appearance: Normal appearance. She is normal weight.   HENT:      Mouth/Throat:      Mouth: Mucous membranes are moist.      Pharynx: Oropharynx is clear.   Eyes:      Conjunctiva/sclera: Conjunctivae normal.      Pupils: Pupils are equal, round, and reactive to light.   Cardiovascular:      Rate and Rhythm: Normal rate and regular rhythm.   Pulmonary:      Effort: Pulmonary effort is normal.      Breath sounds: Normal breath sounds.   Abdominal:      General: Abdomen is flat. There is no distension.      Palpations: Abdomen is soft. There is no mass.      Tenderness: There is no abdominal tenderness.   Genitourinary:     General: Normal vulva.      Vagina: Normal.      Uterus: Absent.       Rectum: Normal.   Musculoskeletal:         General: Normal range of motion.   Skin:     General: Skin is warm and dry.   Neurological:      Mental Status: She is alert.   Psychiatric:         Mood and Affect: Mood normal.         Behavior: Behavior normal.     Performance Status:  Asymptomatic      Assessment/Plan     Dacia is a 68 year old female s/p TLH/BSO sentinel pelvic lymph node dissection for Stage IA mucinous endometrial cancer on 8/29/19.     Plan:    Physical examination was within normal limits today.  She is currently MORRIS.  We reviewed signs and symptoms of possible recurrence with the patient and she will call  our office should she experience any of these.     Follow up in 6 months for surveillance visit, can start annual visits after    Order placed for mammogram in January     Instructed to call office with any concerns       Dhara Newberry, APRN-CNP

## 2024-06-27 ENCOUNTER — OFFICE VISIT (OUTPATIENT)
Dept: GYNECOLOGIC ONCOLOGY | Facility: CLINIC | Age: 69
End: 2024-06-27
Payer: MEDICARE

## 2024-06-27 VITALS
OXYGEN SATURATION: 97 % | RESPIRATION RATE: 18 BRPM | WEIGHT: 186.4 LBS | DIASTOLIC BLOOD PRESSURE: 76 MMHG | BODY MASS INDEX: 32 KG/M2 | HEART RATE: 69 BPM | TEMPERATURE: 98.1 F | SYSTOLIC BLOOD PRESSURE: 114 MMHG

## 2024-06-27 DIAGNOSIS — Z12.31 SCREENING MAMMOGRAM, ENCOUNTER FOR: ICD-10-CM

## 2024-06-27 DIAGNOSIS — C54.1 ENDOMETRIAL CARCINOMA (MULTI): Primary | ICD-10-CM

## 2024-06-27 PROCEDURE — 99214 OFFICE O/P EST MOD 30 MIN: CPT | Performed by: NURSE PRACTITIONER

## 2024-06-27 PROCEDURE — 3074F SYST BP LT 130 MM HG: CPT | Performed by: NURSE PRACTITIONER

## 2024-06-27 PROCEDURE — 3078F DIAST BP <80 MM HG: CPT | Performed by: NURSE PRACTITIONER

## 2024-06-27 PROCEDURE — 1159F MED LIST DOCD IN RCRD: CPT | Performed by: NURSE PRACTITIONER

## 2024-06-27 PROCEDURE — 1126F AMNT PAIN NOTED NONE PRSNT: CPT | Performed by: NURSE PRACTITIONER

## 2024-06-27 PROCEDURE — 1123F ACP DISCUSS/DSCN MKR DOCD: CPT | Performed by: NURSE PRACTITIONER

## 2024-06-27 RX ORDER — HYDROGEN PEROXIDE 3 %
20 SOLUTION, NON-ORAL MISCELLANEOUS
COMMUNITY

## 2024-06-27 RX ORDER — CALCIUM CARBONATE 500(1250)
1 TABLET ORAL
COMMUNITY

## 2024-06-27 ASSESSMENT — PAIN SCALES - GENERAL: PAINLEVEL: 0-NO PAIN

## 2024-07-18 ENCOUNTER — APPOINTMENT (OUTPATIENT)
Dept: GYNECOLOGIC ONCOLOGY | Facility: CLINIC | Age: 69
End: 2024-07-18
Payer: MEDICARE

## 2024-07-24 ENCOUNTER — LAB (OUTPATIENT)
Dept: LAB | Facility: LAB | Age: 69
End: 2024-07-24
Payer: MEDICARE

## 2024-07-24 ENCOUNTER — HOSPITAL ENCOUNTER (OUTPATIENT)
Dept: RADIOLOGY | Facility: CLINIC | Age: 69
Discharge: HOME | End: 2024-07-24
Payer: MEDICARE

## 2024-07-24 ENCOUNTER — APPOINTMENT (OUTPATIENT)
Dept: RHEUMATOLOGY | Facility: CLINIC | Age: 69
End: 2024-07-24
Payer: MEDICARE

## 2024-07-24 VITALS
HEIGHT: 64 IN | DIASTOLIC BLOOD PRESSURE: 77 MMHG | HEART RATE: 71 BPM | SYSTOLIC BLOOD PRESSURE: 125 MMHG | WEIGHT: 195.37 LBS | BODY MASS INDEX: 33.35 KG/M2

## 2024-07-24 DIAGNOSIS — E78.2 HYPERLIPEMIA, MIXED: ICD-10-CM

## 2024-07-24 DIAGNOSIS — M12.9 ARTHROPATHY: ICD-10-CM

## 2024-07-24 DIAGNOSIS — Z11.59 SCREENING FOR VIRAL DISEASE: ICD-10-CM

## 2024-07-24 DIAGNOSIS — M85.80 OSTEOPENIA, UNSPECIFIED LOCATION: ICD-10-CM

## 2024-07-24 DIAGNOSIS — I10 BENIGN ESSENTIAL HYPERTENSION: ICD-10-CM

## 2024-07-24 DIAGNOSIS — M19.90 GENERALIZED ARTHRITIS: ICD-10-CM

## 2024-07-24 DIAGNOSIS — M12.9 ARTHROPATHY: Primary | ICD-10-CM

## 2024-07-24 DIAGNOSIS — E11.9 TYPE 2 DIABETES MELLITUS WITHOUT COMPLICATION, WITHOUT LONG-TERM CURRENT USE OF INSULIN (MULTI): ICD-10-CM

## 2024-07-24 PROBLEM — M25.569 KNEE PAIN: Status: ACTIVE | Noted: 2024-07-24

## 2024-07-24 PROBLEM — N90.89 LESION OF VULVA: Status: ACTIVE | Noted: 2024-07-24

## 2024-07-24 PROBLEM — R10.9 ABDOMINAL PAIN: Status: ACTIVE | Noted: 2024-07-24

## 2024-07-24 PROBLEM — R06.02 SHORTNESS OF BREATH: Status: ACTIVE | Noted: 2024-07-24

## 2024-07-24 PROBLEM — M79.10 MUSCLE PAIN: Status: ACTIVE | Noted: 2024-07-24

## 2024-07-24 PROBLEM — J20.9 ACUTE BRONCHITIS: Status: ACTIVE | Noted: 2024-07-24

## 2024-07-24 PROBLEM — F32.0 MILD MAJOR DEPRESSION, SINGLE EPISODE (CMS-HCC): Status: RESOLVED | Noted: 2023-02-24 | Resolved: 2024-07-24

## 2024-07-24 PROBLEM — R20.2 TINGLING OF SKIN: Status: ACTIVE | Noted: 2024-07-24

## 2024-07-24 PROBLEM — I88.9 CERVICAL LYMPHADENITIS: Status: ACTIVE | Noted: 2024-07-24

## 2024-07-24 PROBLEM — E87.6 HYPOKALEMIA: Status: ACTIVE | Noted: 2023-03-31

## 2024-07-24 PROBLEM — B02.9 HERPES ZOSTER: Status: ACTIVE | Noted: 2024-07-24

## 2024-07-24 PROBLEM — R25.1 TREMOR: Status: ACTIVE | Noted: 2024-07-24

## 2024-07-24 PROBLEM — R06.2 WHEEZING: Status: ACTIVE | Noted: 2024-07-24

## 2024-07-24 PROBLEM — R42 DIZZINESS: Status: ACTIVE | Noted: 2024-07-24

## 2024-07-24 PROBLEM — M54.30 SCIATICA: Status: ACTIVE | Noted: 2024-07-24

## 2024-07-24 PROBLEM — H61.20 IMPACTED CERUMEN: Status: ACTIVE | Noted: 2024-07-24

## 2024-07-24 PROBLEM — M25.559 ARTHRALGIA OF HIP: Status: ACTIVE | Noted: 2024-07-24

## 2024-07-24 PROBLEM — E66.9 OBESITY WITH BODY MASS INDEX 30 OR GREATER: Status: ACTIVE | Noted: 2024-07-24

## 2024-07-24 PROBLEM — R73.9 HYPERGLYCEMIA: Status: ACTIVE | Noted: 2024-07-24

## 2024-07-24 PROBLEM — R26.9 ABNORMAL GAIT: Status: ACTIVE | Noted: 2024-07-24

## 2024-07-24 PROBLEM — S43.409A SHOULDER SPRAIN: Status: ACTIVE | Noted: 2024-07-24

## 2024-07-24 PROBLEM — Z20.822 CONTACT WITH AND (SUSPECTED) EXPOSURE TO COVID-19: Status: ACTIVE | Noted: 2023-03-25

## 2024-07-24 PROBLEM — D13.6 BENIGN NEOPLASM OF PANCREAS: Status: ACTIVE | Noted: 2023-02-24

## 2024-07-24 PROBLEM — D27.9 SEROUS CYSTADENOMA: Status: RESOLVED | Noted: 2023-02-24 | Resolved: 2024-07-24

## 2024-07-24 PROBLEM — H00.019 HORDEOLUM EXTERNUM OF UPPER EYELID: Status: ACTIVE | Noted: 2024-07-24

## 2024-07-24 PROBLEM — Z86.39 HISTORY OF ENDOCRINE DISORDER: Status: ACTIVE | Noted: 2024-07-24

## 2024-07-24 PROBLEM — R05.9 COUGH: Status: ACTIVE | Noted: 2024-07-24

## 2024-07-24 PROBLEM — S62.101A WRIST FRACTURE, RIGHT: Status: ACTIVE | Noted: 2018-01-31

## 2024-07-24 LAB
25(OH)D3 SERPL-MCNC: 35 NG/ML (ref 30–100)
ALBUMIN SERPL BCP-MCNC: 4.1 G/DL (ref 3.4–5)
ALP SERPL-CCNC: 81 U/L (ref 33–136)
ALT SERPL W P-5'-P-CCNC: 9 U/L (ref 7–45)
ANION GAP SERPL CALC-SCNC: 15 MMOL/L (ref 10–20)
AST SERPL W P-5'-P-CCNC: 11 U/L (ref 9–39)
BASOPHILS # BLD AUTO: 0.1 X10*3/UL (ref 0–0.1)
BASOPHILS NFR BLD AUTO: 1 %
BILIRUB SERPL-MCNC: 0.4 MG/DL (ref 0–1.2)
BUN SERPL-MCNC: 31 MG/DL (ref 6–23)
CALCIUM SERPL-MCNC: 9.3 MG/DL (ref 8.6–10.6)
CCP IGG SERPL-ACNC: <1 U/ML
CHLORIDE SERPL-SCNC: 105 MMOL/L (ref 98–107)
CO2 SERPL-SCNC: 26 MMOL/L (ref 21–32)
CREAT SERPL-MCNC: 0.96 MG/DL (ref 0.5–1.05)
CRP SERPL-MCNC: 0.82 MG/DL
EGFRCR SERPLBLD CKD-EPI 2021: 64 ML/MIN/1.73M*2
EOSINOPHIL # BLD AUTO: 0.45 X10*3/UL (ref 0–0.7)
EOSINOPHIL NFR BLD AUTO: 4.3 %
ERYTHROCYTE [DISTWIDTH] IN BLOOD BY AUTOMATED COUNT: 14.4 % (ref 11.5–14.5)
ERYTHROCYTE [SEDIMENTATION RATE] IN BLOOD BY WESTERGREN METHOD: 19 MM/H (ref 0–30)
EST. AVERAGE GLUCOSE BLD GHB EST-MCNC: 148 MG/DL
GLUCOSE SERPL-MCNC: 152 MG/DL (ref 74–99)
HBA1C MFR BLD: 6.8 %
HCT VFR BLD AUTO: 36.7 % (ref 36–46)
HCV AB SER QL: NONREACTIVE
HGB BLD-MCNC: 11.1 G/DL (ref 12–16)
IMM GRANULOCYTES # BLD AUTO: 0.03 X10*3/UL (ref 0–0.7)
IMM GRANULOCYTES NFR BLD AUTO: 0.3 % (ref 0–0.9)
LYMPHOCYTES # BLD AUTO: 2.91 X10*3/UL (ref 1.2–4.8)
LYMPHOCYTES NFR BLD AUTO: 27.7 %
MCH RBC QN AUTO: 26.5 PG (ref 26–34)
MCHC RBC AUTO-ENTMCNC: 30.2 G/DL (ref 32–36)
MCV RBC AUTO: 88 FL (ref 80–100)
MONOCYTES # BLD AUTO: 0.76 X10*3/UL (ref 0.1–1)
MONOCYTES NFR BLD AUTO: 7.2 %
NEUTROPHILS # BLD AUTO: 6.26 X10*3/UL (ref 1.2–7.7)
NEUTROPHILS NFR BLD AUTO: 59.5 %
NRBC BLD-RTO: 0 /100 WBCS (ref 0–0)
PLATELET # BLD AUTO: 375 X10*3/UL (ref 150–450)
POTASSIUM SERPL-SCNC: 4.4 MMOL/L (ref 3.5–5.3)
PROT SERPL-MCNC: 7 G/DL (ref 6.4–8.2)
RBC # BLD AUTO: 4.19 X10*6/UL (ref 4–5.2)
RHEUMATOID FACT SER NEPH-ACNC: <10 IU/ML (ref 0–15)
SODIUM SERPL-SCNC: 142 MMOL/L (ref 136–145)
URATE SERPL-MCNC: 7.1 MG/DL (ref 2.3–6.7)
WBC # BLD AUTO: 10.5 X10*3/UL (ref 4.4–11.3)

## 2024-07-24 PROCEDURE — 3078F DIAST BP <80 MM HG: CPT | Performed by: INTERNAL MEDICINE

## 2024-07-24 PROCEDURE — 86803 HEPATITIS C AB TEST: CPT

## 2024-07-24 PROCEDURE — 72202 X-RAY EXAM SI JOINTS 3/> VWS: CPT

## 2024-07-24 PROCEDURE — 99205 OFFICE O/P NEW HI 60 MIN: CPT | Performed by: INTERNAL MEDICINE

## 2024-07-24 PROCEDURE — 86431 RHEUMATOID FACTOR QUANT: CPT

## 2024-07-24 PROCEDURE — 3074F SYST BP LT 130 MM HG: CPT | Performed by: INTERNAL MEDICINE

## 2024-07-24 PROCEDURE — 73130 X-RAY EXAM OF HAND: CPT | Mod: 50

## 2024-07-24 PROCEDURE — 85025 COMPLETE CBC W/AUTO DIFF WBC: CPT

## 2024-07-24 PROCEDURE — 72202 X-RAY EXAM SI JOINTS 3/> VWS: CPT | Performed by: RADIOLOGY

## 2024-07-24 PROCEDURE — 86140 C-REACTIVE PROTEIN: CPT

## 2024-07-24 PROCEDURE — 1036F TOBACCO NON-USER: CPT | Performed by: INTERNAL MEDICINE

## 2024-07-24 PROCEDURE — 3008F BODY MASS INDEX DOCD: CPT | Performed by: INTERNAL MEDICINE

## 2024-07-24 PROCEDURE — 1159F MED LIST DOCD IN RCRD: CPT | Performed by: INTERNAL MEDICINE

## 2024-07-24 PROCEDURE — 80053 COMPREHEN METABOLIC PANEL: CPT

## 2024-07-24 PROCEDURE — 1160F RVW MEDS BY RX/DR IN RCRD: CPT | Performed by: INTERNAL MEDICINE

## 2024-07-24 PROCEDURE — 36415 COLL VENOUS BLD VENIPUNCTURE: CPT

## 2024-07-24 PROCEDURE — 1123F ACP DISCUSS/DSCN MKR DOCD: CPT | Performed by: INTERNAL MEDICINE

## 2024-07-24 PROCEDURE — 86200 CCP ANTIBODY: CPT

## 2024-07-24 PROCEDURE — 82306 VITAMIN D 25 HYDROXY: CPT

## 2024-07-24 PROCEDURE — 1125F AMNT PAIN NOTED PAIN PRSNT: CPT | Performed by: INTERNAL MEDICINE

## 2024-07-24 PROCEDURE — 85652 RBC SED RATE AUTOMATED: CPT

## 2024-07-24 PROCEDURE — 84550 ASSAY OF BLOOD/URIC ACID: CPT

## 2024-07-24 PROCEDURE — 83036 HEMOGLOBIN GLYCOSYLATED A1C: CPT

## 2024-07-24 PROCEDURE — 73130 X-RAY EXAM OF HAND: CPT | Mod: BILATERAL PROCEDURE | Performed by: RADIOLOGY

## 2024-07-24 RX ORDER — DOXYCYCLINE 100 MG/1
100 TABLET ORAL EVERY 12 HOURS
COMMUNITY
Start: 2019-06-17

## 2024-07-24 RX ORDER — DICLOFENAC SODIUM 10 MG/G
GEL TOPICAL 2 TIMES DAILY PRN
COMMUNITY
Start: 2020-11-23

## 2024-07-24 RX ORDER — POLYETHYLENE GLYCOL 3350, SODIUM SULFATE, SODIUM CHLORIDE, POTASSIUM CHLORIDE, ASCORBIC ACID, SODIUM ASCORBATE 140-9-5.2G
KIT ORAL
COMMUNITY
Start: 2019-10-22

## 2024-07-24 RX ORDER — METHOCARBAMOL 750 MG/1
750 TABLET, FILM COATED ORAL 3 TIMES DAILY
COMMUNITY
Start: 2020-07-14

## 2024-07-24 RX ORDER — ALCOHOL 2.38 KG/3.79L
1 GEL TOPICAL 2 TIMES DAILY
COMMUNITY
Start: 2022-09-21

## 2024-07-24 RX ORDER — ACETAMINOPHEN 500 MG
500 TABLET ORAL EVERY 4 HOURS PRN
COMMUNITY
Start: 2019-05-07

## 2024-07-24 RX ORDER — HYDROXYZINE HYDROCHLORIDE 10 MG/1
10 TABLET, FILM COATED ORAL EVERY 4 HOURS PRN
COMMUNITY
Start: 2022-09-14

## 2024-07-24 RX ORDER — LOSARTAN POTASSIUM AND HYDROCHLOROTHIAZIDE 12.5; 5 MG/1; MG/1
1 TABLET ORAL EVERY 12 HOURS
COMMUNITY

## 2024-07-24 RX ORDER — NAPROXEN 500 MG/1
500 TABLET ORAL
COMMUNITY
Start: 2023-10-09

## 2024-07-24 ASSESSMENT — PAIN SCALES - GENERAL: PAINLEVEL: 8

## 2024-07-24 NOTE — PROGRESS NOTES
"Initial Rheumatology Patient Visit    Chief Complaint:  Dacia Babcock \"Allyssa" is a 68 y.o. female presenting today for Referral in for psoriatic arthritis.    History of Presenting Problem:   68 y.o. female with Hx of Psoriasis, DM type 2,  Endometrial cancer 2019 present for evaluation.  She was initially managed with Enbrel  for her skin psoriasis which did help skin, but eventually stopped due to insurance coverage. Her dermatologist then tried her on Stelara in 2017 and it was discontinue due to her endometrial cancer diagnosis.   She felt after 2019, she started to have more issues, she has typically have joint issues in her thumbs and hip since her 50's .   She had right knee replacement in 2019. She deals with with pain in her spine area, neck and shoulder area on the right,  pain in her shin areas, ankles, feet and toes, pain pain in her PIPs and thumb joints. She report pain in her left upper arm muscles.  She typically takes tylenol 650 mg which would help 60%, Advil 400 mg as needed once a day which helps 80%. She does also tried naprosen 500 mg and it helps 60%    Problem List:   Patient Active Problem List   Diagnosis    Hyperlipemia, mixed    Insomnia    IPMN (intraductal papillary mucinous neoplasm)    Endometrial carcinoma (Multi)    Asthma ([facility]-HCC)    Benign essential hypertension    PA (psoriatic arthritis) (Multi)    Primary osteoarthritis of right knee    Psoriasis    Type 2 diabetes mellitus without complication (Multi)    Anxiety and depression    Medicare annual wellness visit, subsequent    Hospital discharge follow-up    Other headache syndrome    Diabetic polyneuropathy associated with type 2 diabetes mellitus (Multi)    Annual physical exam    Abnormal gait    Acute bronchitis    Adhesive capsulitis of right shoulder    Impacted cerumen    Cervical lymphadenitis    Contact with and (suspected) exposure to covid-19    Cough    Dizziness    Hordeolum externum of upper eyelid    " History of endocrine disorder    HTN (hypertension)    Hyperglycemia    Hypokalemia    Abdominal pain    Arthralgia of hip    Knee pain    Lesion of vulva    Muscle pain    Obesity with body mass index 30 or greater    Sciatica    Herpes zoster    Shortness of breath    Shoulder sprain    Sprain of right elbow    Tingling of skin    Tremor    Vitamin D deficiency    Wheezing    Wrist fracture, right    Benign neoplasm of pancreas       Past Medical History:   Past Medical History:   Diagnosis Date    Candidal stomatitis 07/06/2021    Thrush    Erythema intertrigo 10/30/2020    Intertrigo    Essential (primary) hypertension     Benign essential hypertension    Other specified abnormal findings of blood chemistry 12/01/2021    D-dimer, elevated    Other specified diseases of pancreas ([facility]) 10/23/2019    Pancreatic mass    Other tear of medial meniscus, current injury, right knee, initial encounter 06/25/2021    Acute medial meniscus tear of right knee, initial encounter    Other tear of medial meniscus, current injury, right knee, subsequent encounter 12/04/2020    Tear of medial meniscus of right knee, current, unspecified tear type, subsequent encounter    Other tear of medial meniscus, current injury, unspecified knee, initial encounter 11/13/2020    Tear of medial meniscus of knee    Personal history of neoplasm of uncertain behavior     History of neoplasm of uncertain behavior    Personal history of other diseases of the nervous system and sense organs 11/06/2015    History of impacted cerumen    Personal history of other diseases of the nervous system and sense organs 02/05/2020    History of tremor    Personal history of other diseases of the respiratory system     Personal history of asthma    Personal history of other endocrine, nutritional and metabolic disease     History of hypoglycemia    Personal history of other infectious and parasitic diseases 06/13/2015    History of herpes zoster    Personal  history of other mental and behavioral disorders 10/02/2013    History of anxiety disorder    Personal history of other specified conditions 02/08/2014    History of wheezing    Plantar fascial fibromatosis 01/08/2019    Plantar fasciitis    Temporal arteritis (Multi) 02/24/2023    Trigger finger, unspecified finger 03/18/2022    Trigger finger, acquired    Trochanteric bursitis, left hip 06/23/2017    Trochanteric bursitis of left hip    Unspecified fracture of shaft of unspecified radius, initial encounter for closed fracture 04/02/2018    Fracture, radius, shaft    Unspecified internal derangement of right knee 08/24/2021    Internal derangement of right knee       Surgical History:   Past Surgical History:   Procedure Laterality Date    APPENDECTOMY  10/02/2013    Appendectomy    BREAST LUMPECTOMY  10/02/2013    Breast Surgery Lumpectomy    CT ANGIO NECK  3/23/2023    CT NECK ANGIO W AND WO IV CONTRAST [facility]    CT HEAD ANGIO W AND WO IV CONTRAST  3/23/2023    CT HEAD ANGIO W AND WO IV CONTRAST [facility]    MR HEAD ANGIO WO IV CONTRAST  11/11/2020    MR HEAD ANGIO WO IV CONTRAST 11/11/2020 [facility]    OTHER SURGICAL HISTORY  10/02/2013    Anal Fissurectomy    OTHER SURGICAL HISTORY  10/22/2019    Hysterectomy    OTHER SURGICAL HISTORY  10/22/2019    Wrist fracture repair    OTHER SURGICAL HISTORY  04/02/2021    Knee arthroscopy    OTHER SURGICAL HISTORY  04/02/2021    Salpingo-oophorectomy bilateral    TUBAL LIGATION  10/02/2013    Tubal Ligation        Allergies:   Allergies   Allergen Reactions    Amoxicillin-Pot Clavulanate Unknown    Calcipotriene Unknown    Ephedrine Other    Tetracycline GI Upset    Propoxyphene-Acetaminophen Itching and Rash       Medications:   Current Outpatient Medications:     acetaminophen (Tylenol) 500 mg tablet, Take 1 tablet (500 mg) by mouth every 4 hours if needed for moderate pain (4 - 6)., Disp: , Rfl:     amLODIPine (Norvasc) 5 mg tablet, Take 1 tablet (5 mg) by  mouth once daily. As directed, Disp: 90 tablet, Rfl: 1    aspirin 81 mg EC tablet, Take 1 tablet (81 mg) by mouth every other day., Disp: , Rfl:     atorvastatin (Lipitor) 10 mg tablet, Take 1 tablet (10 mg) by mouth once every day., Disp: 90 tablet, Rfl: 1    blood sugar diagnostic (Accu-Chek Guide test strips) strip, 1 strip once daily., Disp: , Rfl:     calcium carbonate (Oscal) 500 mg calcium (1,250 mg) tablet, Take 1 tablet (1,250 mg) by mouth 2 times daily (morning and late afternoon)., Disp: , Rfl:     esomeprazole (NexIUM) 20 mg DR capsule, Take 1 capsule (20 mg) by mouth once daily in the morning. Take before meals. Do not open capsule., Disp: , Rfl:     flash glucose sensor kit (FreeStyle Shyla 2 Sensor) kit, use to test blood sugar daily, Disp: 1 each, Rfl: 0    gabapentin (Neurontin) 100 mg capsule, Take 1 capsule (100 mg) by mouth 5 times a day. Take 5 pills daily as follows: 100mg AM; 100mg evening; 300mg QHS, Disp: 450 capsule, Rfl: 0    gabapentin (Neurontin) 300 mg capsule, TAKE ONE CAPSULE BY MOUTH EVERY DAY AT BEDTIME, Disp: 90 capsule, Rfl: 0    glipiZIDE XL (Glucotrol XL) 2.5 mg 24 hr tablet, Take 1 tablet (2.5 mg) by mouth once daily with a meal. Do not crush, chew, or split., Disp: 90 tablet, Rfl: 3    hydrOXYzine HCL (Atarax) 10 mg tablet, Take 1 tablet (10 mg) by mouth every 4 hours if needed for itching or anxiety., Disp: , Rfl:     ibuprofen 600 mg tablet, Take 1 tablet (600 mg) by mouth every 6 hours., Disp: , Rfl:     losartan-hydrochlorothiazide (Hyzaar) 50-12.5 mg tablet, Take 1 tablet by mouth every 12 hours., Disp: , Rfl:     metFORMIN  mg 24 hr tablet, Take 2 tablets (1,000 mg) by mouth 2 times a day., Disp: 360 tablet, Rfl: 1    methocarbamol (Robaxin) 750 mg tablet, Take 1 tablet (750 mg) by mouth 3 times a day., Disp: , Rfl:     naproxen (Naprosyn) 500 mg tablet, Take 1 tablet (500 mg) by mouth 2 times daily (morning and late afternoon)., Disp: , Rfl:     nystatin  (Mycostatin) cream, Apply 2-3 times daily to affected areas, Disp: , Rfl:     olmesartan-hydrochlorothiazide (BENIcar HCT) 40-25 mg tablet, TAKE ONE TABLET BY MOUTH EVERY DAY, Disp: 90 tablet, Rfl: 0    sertraline (Zoloft) 25 mg tablet, Take 1 tablet (25 mg) by mouth once every day., Disp: 90 tablet, Rfl: 1    triamcinolone (Kenalog) 0.025 % ointment, Apply to affected areas on trunk twice a day avoid face and groin, Disp: , Rfl:     diclofenac sodium (Voltaren) 1 % gel, Apply topically 2 times a day as needed., Disp: , Rfl:     doxycycline (Adoxa) 100 mg tablet, Take 1 tablet (100 mg) by mouth every 12 hours., Disp: , Rfl:     ezuyutjzm-V4-hfD35-algal oil 3 mg-35 mg-2 mg -90.314 mg capsule, Take 1 capsule by mouth twice a day., Disp: , Rfl:     omeprazole (PriLOSEC) 20 mg DR capsule, Take 1 capsule (20 mg) by mouth 2 times a day., Disp: , Rfl:     [id number]-sod sul-NaCl-KCl-asb-C (Plenvu) 140-9-5.2 gram powder in packet, sequential, Take by mouth., Disp: , Rfl:     secukinumab (Cosentyx) 150 mg/mL self-injector pen, Inject 1 mL (150 mg) under the skin 1 (one) time per week., Disp: , Rfl:     SITagliptin phosphate (Januvia) 50 mg tablet, Take 1 tablet (50 mg) by mouth once daily., Disp: , Rfl:     Review of Systems:   ROS: She report Morning stiffness of 15-20 ,  She report  intermittent joint  swelling in her right ankle which is not painful ,She report  dry eyes and mouth, Denies oral, nasal or genital ulcers, Denies sun sensitivity but break out in welts.. Denies  Raynaud's phenomenon. Denies Uveitis or recent vision changes. She report Hx of Psoriasis also in father , Denies alopecia,   Denies Chest pain/SOB, Denies cough, Hx of asthma, Denies Fever/chills/sweats, She report Fatigue in the 9 months , Denies weight loss  Denies abdominal pain, New onset Dyspepsia, Denies dysphasia, nausea/vomiting/diarrhea, dark/tarry stools,  Denies blood in stools or urine. She report Chronic back pain.   Denies Hx of blood clot  "in the left leg 1975, She report  Hx of easy bruising,. She does have Hx of migraines, denies  numbness and tingling, weakness.  All other systems have been reviewed and are negative for complaint.     Objective   Physical Examination:    Visit Vitals  /77 (Patient Position: Sitting) Comment (BP Location): right wrist   Pulse 71   Ht 1.626 m (5' 4\")   Wt 88.6 kg (195 lb 5.9 oz)   BMI 33.54 kg/m²   OB Status Postmenopausal   Smoking Status Never   BSA 2 m²        Gen: NAD, A&O x 3  HEENT: clear sclera and conjunctiva,     Musculoskeletal:   Neck; WNL, full ROM  Shoulder: WNL, full ROM  Elbow:WNL, full ROM, no effusion noted  Wrist and fingers;no active synovitis noted, Full ROM in the Wrist , Good fist and   Knees:  No effusions or crepitation, full ROM.  Hips; WNL, full ROM, Negative Ant test  Ankle, Feet; WNL, full ROM    Skin: No rashes or lesions seen, no nail changes  Neuro: A&O x3, Normal Gait    Procedures :None    Orders:  No orders of the defined types were placed in this encounter.       Provider Impression:   Assessment/Plan   Encounter Diagnoses   Name Primary?    PA (psoriatic arthritis) (Multi)     Generalized arthritis       68 y.o. female with Hx of Psoriasis, DM type 2,  Endometrial cancer 2019 present for evaluation.  History of her biological use for her skin psoriasis however is not well-managed with UV light treatments.   Patient has various joint concerns and wants to rule out underlying psoriatic arthritis.  Review of past workup and physical exam is not suggestive of underlying inflammatory arthritis however would recommend repeating some of the testing  -Obtain additional serologies to rule out other inflammatory arthritis  -Obtain screening x-rays  -Continue current NSAIDs as needed  -Follow-up based on lab results and testing  "

## 2024-07-25 DIAGNOSIS — E79.0 HYPERURICEMIA: Primary | ICD-10-CM

## 2024-08-05 ENCOUNTER — TELEPHONE (OUTPATIENT)
Dept: GYNECOLOGIC ONCOLOGY | Facility: CLINIC | Age: 69
End: 2024-08-05
Payer: MEDICARE

## 2024-08-05 DIAGNOSIS — B00.9 HSV (HERPES SIMPLEX VIRUS) INFECTION: Primary | ICD-10-CM

## 2024-08-05 RX ORDER — VALACYCLOVIR HYDROCHLORIDE 500 MG/1
500 TABLET, FILM COATED ORAL 2 TIMES DAILY
Qty: 10 TABLET | Refills: 0 | Status: SHIPPED | OUTPATIENT
Start: 2024-08-05 | End: 2024-08-10

## 2024-08-15 DIAGNOSIS — G44.89 OTHER HEADACHE SYNDROME: ICD-10-CM

## 2024-08-16 RX ORDER — GABAPENTIN 100 MG/1
CAPSULE ORAL
Qty: 450 CAPSULE | Refills: 0 | Status: SHIPPED | OUTPATIENT
Start: 2024-08-16

## 2024-08-23 ENCOUNTER — APPOINTMENT (OUTPATIENT)
Dept: PRIMARY CARE | Facility: CLINIC | Age: 69
End: 2024-08-23
Payer: MEDICARE

## 2024-08-23 ENCOUNTER — PATIENT MESSAGE (OUTPATIENT)
Dept: PRIMARY CARE | Facility: CLINIC | Age: 69
End: 2024-08-23

## 2024-08-23 VITALS
OXYGEN SATURATION: 97 % | DIASTOLIC BLOOD PRESSURE: 64 MMHG | WEIGHT: 194 LBS | SYSTOLIC BLOOD PRESSURE: 118 MMHG | TEMPERATURE: 98 F | HEART RATE: 77 BPM | BODY MASS INDEX: 33.3 KG/M2

## 2024-08-23 DIAGNOSIS — F32.0 MILD MAJOR DEPRESSION, SINGLE EPISODE (CMS-HCC): ICD-10-CM

## 2024-08-23 DIAGNOSIS — E11.42 DIABETIC POLYNEUROPATHY ASSOCIATED WITH TYPE 2 DIABETES MELLITUS (MULTI): ICD-10-CM

## 2024-08-23 DIAGNOSIS — E66.9 OBESITY WITH BODY MASS INDEX 30 OR GREATER: ICD-10-CM

## 2024-08-23 DIAGNOSIS — E78.2 HYPERLIPEMIA, MIXED: ICD-10-CM

## 2024-08-23 DIAGNOSIS — J45.20 MILD INTERMITTENT ASTHMA WITHOUT COMPLICATION (HHS-HCC): ICD-10-CM

## 2024-08-23 DIAGNOSIS — D49.0 IPMN (INTRADUCTAL PAPILLARY MUCINOUS NEOPLASM): ICD-10-CM

## 2024-08-23 DIAGNOSIS — I10 BENIGN ESSENTIAL HYPERTENSION: Primary | ICD-10-CM

## 2024-08-23 DIAGNOSIS — K21.9 GASTROESOPHAGEAL REFLUX DISEASE WITHOUT ESOPHAGITIS: ICD-10-CM

## 2024-08-23 DIAGNOSIS — L40.50 PA (PSORIATIC ARTHRITIS) (MULTI): ICD-10-CM

## 2024-08-23 DIAGNOSIS — E11.9 TYPE 2 DIABETES MELLITUS WITHOUT COMPLICATION, WITHOUT LONG-TERM CURRENT USE OF INSULIN (MULTI): ICD-10-CM

## 2024-08-23 DIAGNOSIS — C54.1 ENDOMETRIAL CARCINOMA (MULTI): ICD-10-CM

## 2024-08-23 PROBLEM — R06.2 WHEEZING: Status: RESOLVED | Noted: 2024-07-24 | Resolved: 2024-08-23

## 2024-08-23 PROBLEM — M25.559 ARTHRALGIA OF HIP: Status: RESOLVED | Noted: 2024-07-24 | Resolved: 2024-08-23

## 2024-08-23 PROBLEM — R06.02 SHORTNESS OF BREATH: Status: RESOLVED | Noted: 2024-07-24 | Resolved: 2024-08-23

## 2024-08-23 PROBLEM — F41.9 ANXIETY AND DEPRESSION: Status: RESOLVED | Noted: 2023-02-24 | Resolved: 2024-08-23

## 2024-08-23 PROBLEM — R05.9 COUGH: Status: RESOLVED | Noted: 2024-07-24 | Resolved: 2024-08-23

## 2024-08-23 PROBLEM — I88.9 CERVICAL LYMPHADENITIS: Status: RESOLVED | Noted: 2024-07-24 | Resolved: 2024-08-23

## 2024-08-23 PROBLEM — H61.20 IMPACTED CERUMEN: Status: RESOLVED | Noted: 2024-07-24 | Resolved: 2024-08-23

## 2024-08-23 PROBLEM — F32.A ANXIETY AND DEPRESSION: Status: RESOLVED | Noted: 2023-02-24 | Resolved: 2024-08-23

## 2024-08-23 PROBLEM — R20.2 TINGLING OF SKIN: Status: RESOLVED | Noted: 2024-07-24 | Resolved: 2024-08-23

## 2024-08-23 PROBLEM — R26.9 ABNORMAL GAIT: Status: RESOLVED | Noted: 2024-07-24 | Resolved: 2024-08-23

## 2024-08-23 PROBLEM — S43.409A SHOULDER SPRAIN: Status: RESOLVED | Noted: 2024-07-24 | Resolved: 2024-08-23

## 2024-08-23 PROBLEM — B02.9 HERPES ZOSTER: Status: RESOLVED | Noted: 2024-07-24 | Resolved: 2024-08-23

## 2024-08-23 PROBLEM — R42 DIZZINESS: Status: RESOLVED | Noted: 2024-07-24 | Resolved: 2024-08-23

## 2024-08-23 PROBLEM — J20.9 ACUTE BRONCHITIS: Status: RESOLVED | Noted: 2024-07-24 | Resolved: 2024-08-23

## 2024-08-23 PROBLEM — Z20.822 CONTACT WITH AND (SUSPECTED) EXPOSURE TO COVID-19: Status: RESOLVED | Noted: 2023-03-25 | Resolved: 2024-08-23

## 2024-08-23 PROCEDURE — 3078F DIAST BP <80 MM HG: CPT | Performed by: FAMILY MEDICINE

## 2024-08-23 PROCEDURE — 99214 OFFICE O/P EST MOD 30 MIN: CPT | Performed by: FAMILY MEDICINE

## 2024-08-23 PROCEDURE — 3074F SYST BP LT 130 MM HG: CPT | Performed by: FAMILY MEDICINE

## 2024-08-23 PROCEDURE — 1036F TOBACCO NON-USER: CPT | Performed by: FAMILY MEDICINE

## 2024-08-23 PROCEDURE — 1123F ACP DISCUSS/DSCN MKR DOCD: CPT | Performed by: FAMILY MEDICINE

## 2024-08-23 PROCEDURE — 3044F HG A1C LEVEL LT 7.0%: CPT | Performed by: FAMILY MEDICINE

## 2024-08-23 PROCEDURE — 1159F MED LIST DOCD IN RCRD: CPT | Performed by: FAMILY MEDICINE

## 2024-08-23 RX ORDER — AMLODIPINE BESYLATE 5 MG/1
5 TABLET ORAL DAILY
Qty: 90 TABLET | Refills: 1 | Status: SHIPPED | OUTPATIENT
Start: 2024-08-23 | End: 2024-11-21

## 2024-08-23 RX ORDER — FLASH GLUCOSE SENSOR
KIT MISCELLANEOUS
Qty: 1 EACH | Refills: 0 | Status: SHIPPED | OUTPATIENT
Start: 2024-08-23 | End: 2024-08-23 | Stop reason: SDUPTHER

## 2024-08-23 RX ORDER — TRIAMCINOLONE ACETONIDE 1 MG/G
CREAM TOPICAL 2 TIMES DAILY
Qty: 30 G | Refills: 0 | Status: SHIPPED | OUTPATIENT
Start: 2024-08-23

## 2024-08-23 RX ORDER — OLMESARTAN MEDOXOMIL AND HYDROCHLOROTHIAZIDE 40/25 40; 25 MG/1; MG/1
1 TABLET ORAL DAILY
Qty: 90 TABLET | Refills: 0 | Status: SHIPPED | OUTPATIENT
Start: 2024-08-23

## 2024-08-23 RX ORDER — FLASH GLUCOSE SENSOR
KIT MISCELLANEOUS
Qty: 6 EACH | Refills: 3 | Status: SHIPPED | OUTPATIENT
Start: 2024-08-23

## 2024-08-23 RX ORDER — SERTRALINE HYDROCHLORIDE 25 MG/1
25 TABLET, FILM COATED ORAL
Qty: 90 TABLET | Refills: 1 | Status: SHIPPED | OUTPATIENT
Start: 2024-08-23

## 2024-08-23 RX ORDER — METFORMIN HYDROCHLORIDE 500 MG/1
1000 TABLET, EXTENDED RELEASE ORAL 2 TIMES DAILY
Qty: 360 TABLET | Refills: 1 | Status: SHIPPED | OUTPATIENT
Start: 2024-08-23

## 2024-08-23 RX ORDER — ATORVASTATIN CALCIUM 10 MG/1
10 TABLET, FILM COATED ORAL
Qty: 90 TABLET | Refills: 1 | Status: SHIPPED | OUTPATIENT
Start: 2024-08-23

## 2024-08-23 RX ORDER — OMEPRAZOLE 20 MG/1
20 CAPSULE, DELAYED RELEASE ORAL 2 TIMES DAILY
Qty: 180 CAPSULE | Refills: 1 | Status: SHIPPED | OUTPATIENT
Start: 2024-08-23

## 2024-08-23 ASSESSMENT — ENCOUNTER SYMPTOMS: LEG PAIN: 1

## 2024-08-23 NOTE — PROGRESS NOTES
Subjective   Patient ID: Naima Babcock is a 68 y.o. female who presents for Follow-up (3 month follow up ), Leg Pain, and Toe Pain.    Here for general check and med refill.    Patient has history of hypertension hyperlipidemia diabetes asthma, psoriasis with arthritis   Hx of endometrial cancer and IPMN being monitored    Seeing oncology, neurology, urogynecology, endocrinology regular.     Recent labs showing slight increase in A1C  Stable anemia  Low Vit D  Normal liver and kidneys  Elevated uric acid level    Neuropathy in feet are bad  Tips of toes are burning.     Still working  Home BS are ok,   Diet is good  1 teens to upper 120's  Not lower than 85  Gets tired easily      MWE completed- 3/1/24  Pap- Hysterectomy  Mammogram 1/24  Dexa 3/24  Colonoscopy 11/19         Leg Pain     Toe Pain          Review of Systems    Objective   /64 (BP Location: Left arm, Patient Position: Sitting, BP Cuff Size: Adult long)   Pulse 77   Temp 36.7 °C (98 °F) (Temporal)   Wt 88 kg (194 lb)   SpO2 97%   BMI 33.30 kg/m²     Physical Exam  Vitals and nursing note reviewed.   Constitutional:       Appearance: Normal appearance.   Cardiovascular:      Rate and Rhythm: Normal rate and regular rhythm.   Pulmonary:      Effort: Pulmonary effort is normal.      Breath sounds: Normal breath sounds.   Musculoskeletal:      Cervical back: Normal range of motion.   Neurological:      Mental Status: She is alert.   Psychiatric:         Mood and Affect: Mood normal.         Behavior: Behavior normal.         Thought Content: Thought content normal.         Judgment: Judgment normal.         Assessment/Plan   Problem List Items Addressed This Visit             ICD-10-CM    Hyperlipemia, mixed E78.2     Stable  Refilling meds at same dose.  Reviewed previous labs.  Recheck in 3 months         Relevant Medications    atorvastatin (Lipitor) 10 mg tablet    Other Relevant Orders    Comprehensive Metabolic Panel    Lipid Panel     IPMN (intraductal papillary mucinous neoplasm) D49.0     Stable  Continue to monitor         Endometrial carcinoma (Multi) C54.1     Continue care per oncology         Asthma ([facility]) J45.909     Stable  Refilling medications when needed.  Continue to monitor         Benign essential hypertension - Primary I10     Stable  Refilling meds at same dose.  Reviewed labs.  Recheck in 3 months         Relevant Medications    amLODIPine (Norvasc) 5 mg tablet    olmesartan-hydrochlorothiazide (BENIcar HCT) 40-25 mg tablet    Other Relevant Orders    CBC and Auto Differential    PA (psoriatic arthritis) (Multi) L40.50     Stable  Reviewed rheumatology note  Continue to monitor         Relevant Medications    triamcinolone (Kenalog) 0.1 % cream    Type 2 diabetes mellitus without complication (Multi) E11.9     Stable  Reviewed A1c numbers.  Continue care per endocrinology.  Prescription given for diabetic shoes.  Eye exam up-to-date         Relevant Medications    FreeStyle Shyla 2 Sensor kit    metFORMIN  mg 24 hr tablet    Other Relevant Orders    Diabetic Shoes    Hemoglobin A1C    Diabetic polyneuropathy associated with type 2 diabetes mellitus (Multi) E11.42     Stable  Continue care per endocrinology  Meds refilled.  Reviewed labs         Relevant Orders    Disability Placard    RESOLVED: Obesity with body mass index 30 or greater E66.9     Other Visit Diagnoses         Codes    Mild major depression, single episode (CMS-HCC)     F32.0    Relevant Medications    sertraline (Zoloft) 25 mg tablet    Gastroesophageal reflux disease without esophagitis     K21.9    Relevant Medications    omeprazole (PriLOSEC) 20 mg DR capsule

## 2024-08-23 NOTE — ASSESSMENT & PLAN NOTE
Stable  Reviewed A1c numbers.  Continue care per endocrinology.  Prescription given for diabetic shoes.  Eye exam up-to-date

## 2024-09-24 ENCOUNTER — APPOINTMENT (OUTPATIENT)
Dept: ENDOCRINOLOGY | Facility: CLINIC | Age: 69
End: 2024-09-24
Payer: MEDICARE

## 2024-09-24 VITALS
WEIGHT: 196 LBS | TEMPERATURE: 97 F | DIASTOLIC BLOOD PRESSURE: 79 MMHG | BODY MASS INDEX: 33.64 KG/M2 | SYSTOLIC BLOOD PRESSURE: 133 MMHG | HEART RATE: 78 BPM

## 2024-09-24 DIAGNOSIS — E11.9 TYPE 2 DIABETES MELLITUS WITHOUT COMPLICATION, WITHOUT LONG-TERM CURRENT USE OF INSULIN (MULTI): Primary | ICD-10-CM

## 2024-09-24 PROCEDURE — 95251 CONT GLUC MNTR ANALYSIS I&R: CPT | Performed by: STUDENT IN AN ORGANIZED HEALTH CARE EDUCATION/TRAINING PROGRAM

## 2024-09-24 PROCEDURE — 1159F MED LIST DOCD IN RCRD: CPT | Performed by: STUDENT IN AN ORGANIZED HEALTH CARE EDUCATION/TRAINING PROGRAM

## 2024-09-24 PROCEDURE — 3075F SYST BP GE 130 - 139MM HG: CPT | Performed by: STUDENT IN AN ORGANIZED HEALTH CARE EDUCATION/TRAINING PROGRAM

## 2024-09-24 PROCEDURE — 3078F DIAST BP <80 MM HG: CPT | Performed by: STUDENT IN AN ORGANIZED HEALTH CARE EDUCATION/TRAINING PROGRAM

## 2024-09-24 PROCEDURE — 99214 OFFICE O/P EST MOD 30 MIN: CPT | Performed by: STUDENT IN AN ORGANIZED HEALTH CARE EDUCATION/TRAINING PROGRAM

## 2024-09-24 PROCEDURE — 1123F ACP DISCUSS/DSCN MKR DOCD: CPT | Performed by: STUDENT IN AN ORGANIZED HEALTH CARE EDUCATION/TRAINING PROGRAM

## 2024-09-24 PROCEDURE — 1125F AMNT PAIN NOTED PAIN PRSNT: CPT | Performed by: STUDENT IN AN ORGANIZED HEALTH CARE EDUCATION/TRAINING PROGRAM

## 2024-09-24 PROCEDURE — 3044F HG A1C LEVEL LT 7.0%: CPT | Performed by: STUDENT IN AN ORGANIZED HEALTH CARE EDUCATION/TRAINING PROGRAM

## 2024-09-24 ASSESSMENT — PAIN SCALES - GENERAL: PAINLEVEL: 2

## 2024-09-24 NOTE — PROGRESS NOTES
"Patient coming in for follow up for T2DM    Subjective   Dacia Babcock \"Naima\" is a 69 y.o. female who presents for follow up for Type 2 diabetes mellitus.   Lab Results   Component Value Date    HGBA1C 6.8 (H) 07/24/2024      Dacia Babcock is a 69y.o. female with pmh of T2DM, Endometrial cancer, IPMN, HTN and HLD coming in for follow up of T2DM complicated by multiple yeast infections leading to discontinuation of jardiance  A1c 6.8% July 2024  Current meds:  Metformin 500 mg extended release 2 tablets twice daily  Glipizide to 2.5 mg daily before breakfast.    Interval hx:  Numbness on the tip on the 2nd and 3rd toes in the left  Pain in the tips of her toes especially at night currently on gabapentin in 300 mg at night and 100 mg during the day  Average blood glucose: 136 mg/dl.   Overall trend and \"glucose pattern insights\" reveals: no current concern for recurrent hypoglycemia    No hypoglycemia  If BG < 90 starts diaphoretic  Diabetes Surveillance: Eye exam:  A month ago. Sees optometrist at [facility] No retinopathy  Foot exam/podiatrist:  No foot ulcers. Recent had sprained ankle  Follows with podiatry  Cardiovascular: no coronary artery bypass graft and no coronary artery disease . On Atorvastatin 10 mg dailyand On ASA 81 mg daily LDL:62  Renal: no nephropathy and no end stage renal disease . UACR neg March 2023 on olmesartan 40-HCTZ-25.   Norvasc for BP      Review of Systems  all pertinent systems reviewed and are otherwise negative   Objective   Visit Vitals  /79 (BP Location: Left arm, Patient Position: Sitting, BP Cuff Size: Adult)   Pulse 78   Temp 36.1 °C (97 °F)   Wt 88.9 kg (196 lb)   BMI 33.64 kg/m²   OB Status Postmenopausal   Smoking Status Never   BSA 2 m²      Physical Exam  Constitutional:       General: She is not in acute distress.     Appearance: Normal appearance.   Eyes:      Extraocular Movements: Extraocular movements intact.      Pupils: Pupils are equal, round, " "and reactive to light.   Cardiovascular:      Rate and Rhythm: Normal rate and regular rhythm.   Pulmonary:      Effort: Pulmonary effort is normal. No respiratory distress.      Breath sounds: Normal breath sounds.   Abdominal:      General: Bowel sounds are normal.      Palpations: Abdomen is soft.      Tenderness: There is no abdominal tenderness.   Skin:     Coloration: Skin is not jaundiced or pale.      Findings: No erythema or rash.   Neurological:      General: No focal deficit present.      Mental Status: She is alert and oriented to person, place, and time.      Deep Tendon Reflexes: Reflexes normal.   Psychiatric:         Mood and Affect: Mood normal.         Behavior: Behavior normal.         Lab Review  Glucose (mg/dL)   Date Value   07/24/2024 152 (H)   12/20/2023 139 (H)   09/15/2023 116 (H)   06/14/2023 130 (H)   03/31/2023 77     Hemoglobin A1C (%)   Date Value   07/24/2024 6.8 (H)   12/20/2023 6.5 (H)   09/15/2023 6.4 (A)   06/14/2023 6.7 (A)   03/07/2023 6.9 (A)     Bicarbonate (mmol/L)   Date Value   07/24/2024 26   12/20/2023 27   09/15/2023 27   06/14/2023 25   03/31/2023 28     Urea Nitrogen (mg/dL)   Date Value   07/24/2024 31 (H)   12/20/2023 30 (H)   09/15/2023 23   06/14/2023 26 (H)   03/31/2023 16     Creatinine (mg/dL)   Date Value   07/24/2024 0.96   12/20/2023 0.78   09/15/2023 0.80   06/14/2023 0.78   03/31/2023 0.96     Lab Results   Component Value Date    CHOL 156 12/20/2023    CHOL 144 09/15/2023    CHOL 180 03/07/2023     Lab Results   Component Value Date    HDL 70.8 12/20/2023    HDL 58.5 09/15/2023    HDL 70.8 03/07/2023     Lab Results   Component Value Date    LDLCALC 62 12/20/2023     Lab Results   Component Value Date    TRIG 114 12/20/2023    TRIG 120 09/15/2023    TRIG 119 03/07/2023     No components found for: \"CHOLHDL\"   Lab Results   Component Value Date    TSH 1.03 12/02/2022     No results found for: \"ALBUR\", \"[id number]\"         Assessment/Plan   Dacia Babcock " "is a 69y.o. female with pmh of T2DM, Endometrial cancer, IPMN, HTN and HLD coming in for follow up of T2DM complicated by multiple yeast infections leading to discontinuation of jardiance  A1c 6.8% July 2024  Current meds:  Metformin 500 mg extended release 2 tablets twice daily  Glipizide to 2.5 mg daily before breakfast.    Interval hx:  Numbness on the tip on the 2nd and 3rd toes in the left  Pain in the tips of her toes especially at night currently on gabapentin in 300 mg at night and 100 mg during the day  Average blood glucose: 136 mg/dl.   Overall trend and \"glucose pattern insights\" reveals: no current concern for recurrent hypoglycemia  If BG < 90 starts diaphoretic  Diabetes Surveillance: Eye exam:  A month ago. Sees optometrist at [facility] No retinopathy  Foot exam/podiatrist:  No foot ulcers. Recent had sprained ankle  Follows with podiatry  Cardiovascular: no coronary artery bypass graft and no coronary artery disease . On Atorvastatin 10 mg dailyand On ASA 81 mg daily LDL:62  Renal: no nephropathy and no end stage renal disease . UACR neg March 2023 on olmesartan 40-HCTZ-25.   Norvasc for BP    Plan:  continue Metformin 500 mg extended release 2 tablets twice daily  Continue Glipizide to 2.5 mg daily which is largest meal of the day.  Blood work in 6 months  Continue Atorvastatin  Continue Olmesartan-hydrochlorothiazide  Follow with ophtahlmology and podiatry  Follow with neurology     Blood work before next apt     RTC in 6 months  Assessment & Plan  Type 2 diabetes mellitus without complication, without long-term current use of insulin (Multi)    Orders:    Albumin-Creatinine Ratio, Urine Random; Future    Lipid Panel; Future    Hemoglobin A1C; Future    Renal Function Panel; Future      "

## 2024-09-24 NOTE — PATIENT INSTRUCTIONS
Continue Metformin 500 mg extended release 2 tablets twice daily  Continue Glipizide to 2.5 mg daily which is largest meal of the day.  Blood work in 6 months  Continue Atorvastatin  Continue Olmesartan-hydrochlorothiazide  Follow with ophtahlmology and podiatry  Follow with neurology     Blood work before next apt     RTC in 6 months

## 2024-09-25 ENCOUNTER — APPOINTMENT (OUTPATIENT)
Dept: ENDOCRINOLOGY | Facility: HOSPITAL | Age: 69
End: 2024-09-25
Payer: MEDICARE

## 2024-10-02 NOTE — ASSESSMENT & PLAN NOTE
Orders:    Albumin-Creatinine Ratio, Urine Random; Future    Lipid Panel; Future    Hemoglobin A1C; Future    Renal Function Panel; Future

## 2024-10-03 DIAGNOSIS — D49.0 IPMN (INTRADUCTAL PAPILLARY MUCINOUS NEOPLASM): ICD-10-CM

## 2024-10-08 DIAGNOSIS — D49.0 IPMN (INTRADUCTAL PAPILLARY MUCINOUS NEOPLASM): Primary | ICD-10-CM

## 2024-10-08 RX ORDER — LORAZEPAM 1 MG/1
1 TABLET ORAL DAILY PRN
Qty: 2 TABLET | Refills: 0 | Status: SHIPPED | OUTPATIENT
Start: 2024-10-08 | End: 2025-01-06

## 2024-10-23 DIAGNOSIS — G44.89 CHRONIC MIXED HEADACHE SYNDROME: ICD-10-CM

## 2024-10-23 RX ORDER — GABAPENTIN 300 MG/1
600 CAPSULE ORAL NIGHTLY
Qty: 90 CAPSULE | Refills: 0 | Status: SHIPPED | OUTPATIENT
Start: 2024-10-23

## 2024-10-24 ENCOUNTER — APPOINTMENT (OUTPATIENT)
Dept: RADIOLOGY | Facility: CLINIC | Age: 69
End: 2024-10-24
Payer: MEDICARE

## 2024-11-13 ENCOUNTER — APPOINTMENT (OUTPATIENT)
Dept: NEUROLOGY | Facility: CLINIC | Age: 69
End: 2024-11-13
Payer: MEDICARE

## 2024-11-13 VITALS
HEART RATE: 73 BPM | DIASTOLIC BLOOD PRESSURE: 88 MMHG | BODY MASS INDEX: 33.64 KG/M2 | SYSTOLIC BLOOD PRESSURE: 142 MMHG | HEIGHT: 64 IN

## 2024-11-13 DIAGNOSIS — R47.89 WORD FINDING DIFFICULTY: ICD-10-CM

## 2024-11-13 DIAGNOSIS — G47.50 EXPLODING HEAD SYNDROME: ICD-10-CM

## 2024-11-13 DIAGNOSIS — E11.42 DIABETIC POLYNEUROPATHY ASSOCIATED WITH TYPE 2 DIABETES MELLITUS (MULTI): ICD-10-CM

## 2024-11-13 DIAGNOSIS — G44.89 OTHER HEADACHE SYNDROME: Primary | ICD-10-CM

## 2024-11-13 PROCEDURE — 1159F MED LIST DOCD IN RCRD: CPT | Performed by: PSYCHIATRY & NEUROLOGY

## 2024-11-13 PROCEDURE — 3079F DIAST BP 80-89 MM HG: CPT | Performed by: PSYCHIATRY & NEUROLOGY

## 2024-11-13 PROCEDURE — 3077F SYST BP >= 140 MM HG: CPT | Performed by: PSYCHIATRY & NEUROLOGY

## 2024-11-13 PROCEDURE — 1160F RVW MEDS BY RX/DR IN RCRD: CPT | Performed by: PSYCHIATRY & NEUROLOGY

## 2024-11-13 PROCEDURE — 3044F HG A1C LEVEL LT 7.0%: CPT | Performed by: PSYCHIATRY & NEUROLOGY

## 2024-11-13 PROCEDURE — 1123F ACP DISCUSS/DSCN MKR DOCD: CPT | Performed by: PSYCHIATRY & NEUROLOGY

## 2024-11-13 PROCEDURE — 99214 OFFICE O/P EST MOD 30 MIN: CPT | Performed by: PSYCHIATRY & NEUROLOGY

## 2024-11-13 NOTE — PATIENT INSTRUCTIONS
As we discussed, I recommend doubling your current dose of vitamin B12 replacement.  This may help with occasional word finding issues as well as with your balance.    Since your headaches are relatively infrequent and manageable, it is reasonable to continue the current regimen of magnesium.  If headaches worsen, consider adding riboflavin (vitamin B2) 400 mg daily as another natural preventative.    I suspect the pain and numbness in your toes is due to a small fiber neuropathy from diabetes.  Continue working with your endocrinologist on tight control.    Please talk with your endocrinologist about assuming full prescribing of gabapentin, as I will be leaving the system.    It is reasonable to see general neurology again in-12 months.  I am providing a referral to my colleague Dr. Lopez for this purpose.

## 2024-11-13 NOTE — PROGRESS NOTES
"Subjective     Dacia Babcock is a 69 y.o. year old female seen in follow-up for headache, numbness and pain in toes, exploding head syndrome, also reports occasional word finding difficulty.    HPI    69-year-old left-handed woman with past medical history significant for hypertension, type 2 diabetes, hyperlipidemia, endometrial carcinoma status post hysterectomy, psoriasis with arthritis, asthma, anxiety, pancreatic cyst, GERD, remote prior headache history referred to as \"cluster, and a tentative diagnosis of temporal arteritis made 3 years ago without biopsy and for which she was treated with prednisone for only about 1 week.     I evaluated her initially as an inpatient consultation at [facility] on 3/24/2023. As detailed in my inpatient EMR note from that date she presented referred by her ophthalmologist after 2 weeks of headache, initially right temporal with subsequent involvement of the left temporal region. She describes stabbing pain in these regions. This was superimposed on underlying dull pain. She had been started on prednisone yet inflammatory markers drawn before prednisone initiation showed normal ESR of 30, marginally elevated CRP of 1.22 mg/dL.     I noted that her prior headache history sounded more like migraine than cluster. I recommended a contrasted brain MRI and initiation of gabapentin 300 mg twice daily. I recommended she stop prednisone as I did not suspect temporal arteritis and in any event she declined biopsy. The contrasted brain MRI showed a moderate burden of FLAIR white matter hyperintensities scattered throughout the cerebral hemispheres in a nonspecific pattern. Small left frontal calvarial osseous hemangioma. No abnormal enhancement.     I evaluated her again on 4/19/2023 by A/V visit. About 3 days after hospital discharge she tested positive for COVID, not requiring inpatient treatment. After about 6 days she was over the worst of it, and during those 6 days " her headaches flared up considerably. However, since then her headaches had improved.     She tried gabapentin 300 mg twice daily but the morning dose made her too drowsy to function so she when I saw her last she was just taking 300 mg nightly. Since she got over the post hospital COVID she had noted no further stabbing pains.      I suggested adding 100-200 mg of gabapentin in the morning in addition to 300 mg at night.     I evaluated her again on 8/3/2023 at which visit she reported improvement in her headache pattern with only occasional, brief mild to moderate pains.  We discussed modifying her gabapentin regimen to 100 mg a.m., 100 mg in the late afternoon/early evening, and continuing 300 mg at bedtime.    I evaluated her most recently on 2/26/2024.  She endorsed 2-3 headaches per month but with high intensity.  She was reluctant to aggressively titrate gabapentin.  I suggested at least increasing the bedtime dose to 400 mg given a frequent occurrence of headache in the middle of the night.  She endorsed numbness and tingling in the toes.  There was little to find on exam with the exception of focal reduction of pin perception over the left second toe.  We discussed the possibility of a strictly small fiber neuropathy in the context of diabetes.  I sent her for a few labs looking for alternate etiologies including B12 level which came back low normal at 266, GARCIA panel which showed positive NASREEN at 1: 320 with negative panel, and SIEP which was negative for monoclonal protein.  She subsequently saw rheumatology regarding the positive NASREEN.    She is evaluated again today in the office.    She reports headache control is pretty good.  When they do occur they are often right hemicranial but they are much less frequent than they used to be and not severe or debilitating.    She continues on magnesium glycinate as a nutraceutical.  She has not yet tried riboflavin.    She is still bothered by numbness and tingling  in the toes bilaterally and lately is getting pain in the tips of the toes, mostly at night or with prolonged standing or prolonged walking.  She additionally notes allodynia of the toes to bedsheets at night.  Of note, her most recent hemoglobin A1c has bumped up a bit to 6.8%.  She has a CGM and is followed by endocrinology.  Her endocrinologist has recommended increasing the night dose of gabapentin to 600 mg but her preference has been to just increase to 500 mg, leaving the morning dose at 100 mg.    She still notes occasional episodes of exploding head syndrome, at a stable frequency of about 2 or 3 times a month.    She mentions occasional word finding issues.  This has been noticeable because she is back to teaching nursing again, 2 days/week.  She was giving a lecture and trying to come up with the word homeostasis and it took her a few seconds to come up with this.  Generally the word that she is unable to say does come back to her relatively quickly, after a moment of thought.  She does not correlate the word finding episodes with fluctuations in blood glucose.  She notes no difficulty with driving nor is she habitually misplacing items.    Of note, she just started B12 replacement in September, probably 500 mcg daily although she was not certain of the dose.    Review of Systems    As per the history of present illness    Patient Active Problem List   Diagnosis    Hyperlipemia, mixed    Insomnia    IPMN (intraductal papillary mucinous neoplasm)    Endometrial carcinoma (Multi)    Asthma    Benign essential hypertension    PA (psoriatic arthritis) (Multi)    Primary osteoarthritis of right knee    Psoriasis    Type 2 diabetes mellitus without complication (Multi)    Medicare annual wellness visit, subsequent    Hospital discharge follow-up    Other headache syndrome    Diabetic polyneuropathy associated with type 2 diabetes mellitus (Multi)    Annual physical exam    Adhesive capsulitis of right shoulder     Hordeolum externum of upper eyelid    History of endocrine disorder    Hyperglycemia    Hypokalemia    Abdominal pain    Knee pain    Lesion of vulva    Muscle pain    Sciatica    Tremor    Vitamin D deficiency    Wrist fracture, right    Benign neoplasm of pancreas     Past Medical History:   Diagnosis Date    Candidal stomatitis 07/06/2021    Thrush    Erythema intertrigo 10/30/2020    Intertrigo    Essential (primary) hypertension     Benign essential hypertension    Herpes zoster 07/24/2024    Other specified abnormal findings of blood chemistry 12/01/2021    D-dimer, elevated    Other specified diseases of pancreas ([facility]) 10/23/2019    Pancreatic mass    Other tear of medial meniscus, current injury, right knee, initial encounter 06/25/2021    Acute medial meniscus tear of right knee, initial encounter    Other tear of medial meniscus, current injury, right knee, subsequent encounter 12/04/2020    Tear of medial meniscus of right knee, current, unspecified tear type, subsequent encounter    Other tear of medial meniscus, current injury, unspecified knee, initial encounter 11/13/2020    Tear of medial meniscus of knee    Personal history of neoplasm of uncertain behavior     History of neoplasm of uncertain behavior    Personal history of other diseases of the nervous system and sense organs 11/06/2015    History of impacted cerumen    Personal history of other diseases of the nervous system and sense organs 02/05/2020    History of tremor    Personal history of other diseases of the respiratory system     Personal history of asthma    Personal history of other endocrine, nutritional and metabolic disease     History of hypoglycemia    Personal history of other infectious and parasitic diseases 06/13/2015    History of herpes zoster    Personal history of other mental and behavioral disorders 10/02/2013    History of anxiety disorder    Personal history of other specified conditions 02/08/2014    History  of wheezing    Plantar fascial fibromatosis 01/08/2019    Plantar fasciitis    Trigger finger, unspecified finger 03/18/2022    Trigger finger, acquired    Trochanteric bursitis, left hip 06/23/2017    Trochanteric bursitis of left hip    Unspecified fracture of shaft of unspecified radius, initial encounter for closed fracture 04/02/2018    Fracture, radius, shaft    Unspecified internal derangement of right knee 08/24/2021    Internal derangement of right knee     Past Surgical History:   Procedure Laterality Date    APPENDECTOMY  10/02/2013    Appendectomy    BREAST LUMPECTOMY  10/02/2013    Breast Surgery Lumpectomy    CT ANGIO NECK  3/23/2023    CT NECK ANGIO W AND WO IV CONTRAST [facility]    CT HEAD ANGIO W AND WO IV CONTRAST  3/23/2023    CT HEAD ANGIO W AND WO IV CONTRAST [facility]    MR HEAD ANGIO WO IV CONTRAST  11/11/2020    MR HEAD ANGIO WO IV CONTRAST 11/11/2020 [facility]    OTHER SURGICAL HISTORY  10/02/2013    Anal Fissurectomy    OTHER SURGICAL HISTORY  10/22/2019    Hysterectomy    OTHER SURGICAL HISTORY  10/22/2019    Wrist fracture repair    OTHER SURGICAL HISTORY  04/02/2021    Knee arthroscopy    OTHER SURGICAL HISTORY  04/02/2021    Salpingo-oophorectomy bilateral    TUBAL LIGATION  10/02/2013    Tubal Ligation     Social History     Tobacco Use    Smoking status: Never     Passive exposure: Never    Smokeless tobacco: Never   Substance Use Topics    Alcohol use: Not Currently     family history includes Alzheimer's disease in her mother; Brain cancer in her mother; Breast cancer in her maternal grandmother; Heart failure in her mother; Kidney cancer in her father and sister.    Current Outpatient Medications:     acetaminophen (Tylenol) 500 mg tablet, Take 1 tablet (500 mg) by mouth every 4 hours if needed for moderate pain (4 - 6)., Disp: , Rfl:     amLODIPine (Norvasc) 5 mg tablet, Take 1 tablet (5 mg) by mouth once daily. As directed, Disp: 90 tablet, Rfl: 1    aspirin 81 mg EC tablet,  Take 1 tablet (81 mg) by mouth every other day., Disp: , Rfl:     atorvastatin (Lipitor) 10 mg tablet, Take 1 tablet (10 mg) by mouth once every day., Disp: 90 tablet, Rfl: 1    blood sugar diagnostic (Accu-Chek Guide test strips) strip, 1 strip once daily., Disp: , Rfl:     calcium carbonate (Oscal) 500 mg calcium (1,250 mg) tablet, Take 1 tablet (1,250 mg) by mouth 2 times daily (morning and late afternoon)., Disp: , Rfl:     diclofenac sodium (Voltaren) 1 % gel, Apply topically 2 times a day as needed., Disp: , Rfl:     doxycycline (Adoxa) 100 mg tablet, Take 1 tablet (100 mg) by mouth every 12 hours., Disp: , Rfl:     esomeprazole (NexIUM) 20 mg DR capsule, Take 1 capsule (20 mg) by mouth once daily in the morning. Take before meals. Do not open capsule., Disp: , Rfl:     flash glucose sensor kit (FreeStyle Shyla 2 Sensor) kit, use to test blood sugar daily, Disp: 1 each, Rfl: 0    FreeStyle Shyla 2 Sensor kit, Use as instructed, Disp: 6 each, Rfl: 3    gabapentin (Neurontin) 100 mg capsule, Take 5 pills daily as follows: 100mg AM; 100mg evening; 300mg QHS, Disp: 450 capsule, Rfl: 0    gabapentin (Neurontin) 300 mg capsule, Take 2 capsules (600 mg) by mouth once daily at bedtime. Take at bedtime., Disp: 90 capsule, Rfl: 0    glipiZIDE XL (Glucotrol XL) 2.5 mg 24 hr tablet, Take 1 tablet (2.5 mg) by mouth once daily with a meal. Do not crush, chew, or split., Disp: 90 tablet, Rfl: 3    hydrOXYzine HCL (Atarax) 10 mg tablet, Take 1 tablet (10 mg) by mouth every 4 hours if needed for itching or anxiety., Disp: , Rfl:     ibuprofen 600 mg tablet, Take 1 tablet (600 mg) by mouth every 6 hours., Disp: , Rfl:     ojypfdgop-[id number]-algal oil 3 mg-35 mg-2 mg -90.314 mg capsule, Take 1 capsule by mouth once daily., Disp: , Rfl:     LORazepam (Ativan) 1 mg tablet, Take 1 tablet (1 mg) by mouth once daily as needed for anxiety (as needed for MRI)., Disp: 2 tablet, Rfl: 0    metFORMIN  mg 24 hr tablet, Take 2 tablets  (1,000 mg) by mouth 2 times a day., Disp: 360 tablet, Rfl: 1    methocarbamol (Robaxin) 750 mg tablet, Take 1 tablet (750 mg) by mouth 3 times a day., Disp: , Rfl:     naproxen (Naprosyn) 500 mg tablet, Take 1 tablet (500 mg) by mouth 2 times daily (morning and late afternoon)., Disp: , Rfl:     nystatin (Mycostatin) cream, Apply 2-3 times daily to affected areas, Disp: , Rfl:     olmesartan-hydrochlorothiazide (BENIcar HCT) 40-25 mg tablet, Take 1 tablet by mouth once daily., Disp: 90 tablet, Rfl: 0    omeprazole (PriLOSEC) 20 mg DR capsule, Take 1 capsule (20 mg) by mouth 2 times a day., Disp: 180 capsule, Rfl: 1    sertraline (Zoloft) 25 mg tablet, Take 1 tablet (25 mg) by mouth once every day., Disp: 90 tablet, Rfl: 1    SITagliptin phosphate (Januvia) 50 mg tablet, Take 1 tablet (50 mg) by mouth once daily., Disp: , Rfl:     triamcinolone (Kenalog) 0.1 % cream, Apply topically 2 times a day. Apply to affected area 1-2 times daily as needed. Avoid face and groin., Disp: 30 g, Rfl: 0  Allergies   Allergen Reactions    Amoxicillin-Pot Clavulanate Unknown    Calcipotriene Unknown    Ephedrine Other    Tetracycline GI Upset    Propoxyphene-Acetaminophen Itching and Rash       Objective   Neurological Exam  Physical Exam    Physical Examination:    General: Alert woman who was ambulatory without assistive devices.      Mental Status: Clear sensorium without fluctuation.  Appropriate in conversation.  Fluent unremarkable speech without paraphasic errors, hesitancy or evident word finding difficulty.  Good command of details of medical history.    Cranial Nerves:  Funduscopic exam was not well visualized bilaterally on nondilated exam.  Pupils were equal, round and reactive to light with no relative afferent pupillary defect.  Extraocular movements were intact and conjugate without nystagmus.  No ptosis.  Visual fields were full to confrontation tested binocularly.  Facial motor function was symmetrically intact.   Hearing was grossly intact.  No dysarthria.  Shoulder shrug was symmetric.  Tongue protrusion was midline.    Motor: Muscle bulk and tone were normal throughout. There was no pronator drift or asymmetry of finger taps. Confrontation strength was symmetrically 5/5 throughout the upper and lower extremities.     Coordination: No postural or rest tremor, myoclonus or dystonic posturing.    Sensation: Pin was sharp over the first 3 toes of the right foot and all toes of the left foot with the exception of digit 2.  There was no stocking gradient.  Vibration perception was present for several seconds at either great toe.    Station: Intact and stable.    Gait: Stable and unremarkable.      Assessment/Plan     She reports reasonably good headache control on the present regimen of magnesium glycinate, and gabapentin may be helping additionally with headache control although prescribed additionally for foot pain.  I recommended she continue the present regimen, but discussed that she may want to add riboflavin 400 mg daily as another nutraceutical should her headache pattern worsen in the future.    Her foot symptoms probably reflect small fiber neuropathy in the context of diabetes.  The pain in the toes may reflect the fact that her A1c has bumped up a bit since the last assessment.  I advised her to continue following closely with endocrinology.    I asked her to have her endocrinologist assume full prescribing duties for gabapentin as I will be leaving the  system shortly, and as it is preferred in [place] that there be a single prescriber for gabapentin.  At present we are both prescribers.    Her word finding difficulty as described is a minimal issue, that we discussed could simply be normal brain aging.  It does not seem to track with blood glucose.  However, as her B12 level was low normal and she just started on replacement recently, probably just 500 mcg daily, I suggested she double her current replacement dose  and see whether that helps.    I provided a referral to my colleague Dr. Lopez for future general neurology follow-up.

## 2024-11-14 ENCOUNTER — HOSPITAL ENCOUNTER (OUTPATIENT)
Dept: RADIOLOGY | Facility: CLINIC | Age: 69
Discharge: HOME | End: 2024-11-14
Payer: MEDICARE

## 2024-11-14 DIAGNOSIS — D49.0 IPMN (INTRADUCTAL PAPILLARY MUCINOUS NEOPLASM): ICD-10-CM

## 2024-11-14 PROCEDURE — 74183 MRI ABD W/O CNTR FLWD CNTR: CPT

## 2024-11-14 PROCEDURE — A9575 INJ GADOTERATE MEGLUMI 0.1ML: HCPCS | Performed by: SURGERY

## 2024-11-14 PROCEDURE — 2550000001 HC RX 255 CONTRASTS: Performed by: SURGERY

## 2024-11-14 RX ORDER — GADOTERATE MEGLUMINE 376.9 MG/ML
18 INJECTION INTRAVENOUS
Status: COMPLETED | OUTPATIENT
Start: 2024-11-14 | End: 2024-11-14

## 2024-11-18 NOTE — PROGRESS NOTES
"Reason for visit:  FUV / Review Imaging    HPI:  Summary from 2023 visit:    \"Recall that I met her in February 2020 for multifocal branch duct IPMN with dominant lesions of 1.4 and 1.2 cm respectively. I have seen her in 2021 and 2022 and her MRI is remained completely stable.     Over the course of the last year she reports no new health issues with the exception of a brief hospitalization for some headaches.     She recently had her annual MRI done on August 17 which shows a completely stable picture of multifocal branch duct IPMN with the dominant lesions of 1.4 and 1.2 centimeters without any worrisome features or high risk stigmata. There is no solid nodular enhancing component. There is no pancreatic ductal dilatation.     We simply need to keep an eye on her pancreatic cyst with an annual MRI on an ongoing basis. Next year she will call the office and my team will set up the MRI and office visit.\"    -----    MRI Nov 2024:  PANCREAS:  Redemonstration of nonenhancing cystic lesion of the pancreatic head,  previously measuring 1.7 x 0.8 cm in currently measuring 1.6 x 1.0 cm  (series 33, image 49) which is overall similar compared to prior  examination. There is an additional cystic lesion involving the  pancreatic tail, previously measuring 1 3 x 1.2 cm, currently  measuring overall similar in size measuring 1.4 x 1.3 cm (series 33,  image 54). There are additional nonenhancing, subcentimeter cystic  lesions throughout the pancreatic duct, most significantly involving  the pancreatic tail which is not significantly changed compared to  prior examination (series 33, image 58). No evidence of pancreatic  ductal dilatation.  IMPRESSION:  1.  Stable side-branch cystic lesions throughout the pancreas, not  significantly changed dating back to prior examination from  07/13/2022. No evidence of new pancreatic ductal dilatation or new  complexity is is evident.    No new health issues.  Now lives with her daughter " and grandchildren.  Providing a lot of care for them.  Still active.    Impression / Plan:    This patient's imaging is stable in terms of likely multifocal branch duct IPMN.  She has no solid nodular enhancing component.  She has no worrisome ductal dilatation.  We will simply continue with annual MRI surveillance.  She will call the office in a year for that.    This note has been dictated with voice recognition software and has not been reviewed for grammar or content errors.

## 2024-11-19 DIAGNOSIS — I10 BENIGN ESSENTIAL HYPERTENSION: ICD-10-CM

## 2024-11-20 ENCOUNTER — APPOINTMENT (OUTPATIENT)
Dept: PRIMARY CARE | Facility: CLINIC | Age: 69
End: 2024-11-20
Payer: MEDICARE

## 2024-11-22 ENCOUNTER — APPOINTMENT (OUTPATIENT)
Dept: SURGERY | Facility: CLINIC | Age: 69
End: 2024-11-22
Payer: MEDICARE

## 2024-11-22 VITALS
OXYGEN SATURATION: 95 % | WEIGHT: 194.6 LBS | RESPIRATION RATE: 20 BRPM | TEMPERATURE: 97.3 F | HEIGHT: 64 IN | HEART RATE: 72 BPM | BODY MASS INDEX: 33.22 KG/M2 | DIASTOLIC BLOOD PRESSURE: 86 MMHG | SYSTOLIC BLOOD PRESSURE: 136 MMHG

## 2024-11-22 DIAGNOSIS — D49.0 IPMN (INTRADUCTAL PAPILLARY MUCINOUS NEOPLASM): Primary | ICD-10-CM

## 2024-11-22 PROCEDURE — 1159F MED LIST DOCD IN RCRD: CPT | Performed by: SURGERY

## 2024-11-22 PROCEDURE — 3075F SYST BP GE 130 - 139MM HG: CPT | Performed by: SURGERY

## 2024-11-22 PROCEDURE — 1123F ACP DISCUSS/DSCN MKR DOCD: CPT | Performed by: SURGERY

## 2024-11-22 PROCEDURE — 3044F HG A1C LEVEL LT 7.0%: CPT | Performed by: SURGERY

## 2024-11-22 PROCEDURE — 1125F AMNT PAIN NOTED PAIN PRSNT: CPT | Performed by: SURGERY

## 2024-11-22 PROCEDURE — 1036F TOBACCO NON-USER: CPT | Performed by: SURGERY

## 2024-11-22 PROCEDURE — 99212 OFFICE O/P EST SF 10 MIN: CPT | Performed by: SURGERY

## 2024-11-22 PROCEDURE — 3008F BODY MASS INDEX DOCD: CPT | Performed by: SURGERY

## 2024-11-22 PROCEDURE — 3079F DIAST BP 80-89 MM HG: CPT | Performed by: SURGERY

## 2024-11-22 RX ORDER — OLMESARTAN MEDOXOMIL AND HYDROCHLOROTHIAZIDE 40/25 40; 25 MG/1; MG/1
1 TABLET ORAL DAILY
Qty: 90 TABLET | Refills: 0 | Status: SHIPPED | OUTPATIENT
Start: 2024-11-22

## 2024-11-22 ASSESSMENT — PAIN SCALES - GENERAL: PAINLEVEL_OUTOF10: 2

## 2024-12-02 ENCOUNTER — OFFICE VISIT (OUTPATIENT)
Dept: PRIMARY CARE | Facility: CLINIC | Age: 69
End: 2024-12-02
Payer: MEDICARE

## 2024-12-02 VITALS
SYSTOLIC BLOOD PRESSURE: 120 MMHG | DIASTOLIC BLOOD PRESSURE: 60 MMHG | TEMPERATURE: 98 F | BODY MASS INDEX: 33.29 KG/M2 | OXYGEN SATURATION: 98 % | HEART RATE: 67 BPM | WEIGHT: 195 LBS

## 2024-12-02 DIAGNOSIS — E78.2 HYPERLIPEMIA, MIXED: ICD-10-CM

## 2024-12-02 DIAGNOSIS — Z12.31 ENCOUNTER FOR SCREENING MAMMOGRAM FOR MALIGNANT NEOPLASM OF BREAST: ICD-10-CM

## 2024-12-02 DIAGNOSIS — K21.9 GASTROESOPHAGEAL REFLUX DISEASE WITHOUT ESOPHAGITIS: ICD-10-CM

## 2024-12-02 DIAGNOSIS — I10 BENIGN ESSENTIAL HYPERTENSION: Primary | ICD-10-CM

## 2024-12-02 DIAGNOSIS — D49.0 IPMN (INTRADUCTAL PAPILLARY MUCINOUS NEOPLASM): ICD-10-CM

## 2024-12-02 DIAGNOSIS — L40.50 PA (PSORIATIC ARTHRITIS) (MULTI): ICD-10-CM

## 2024-12-02 DIAGNOSIS — E11.9 TYPE 2 DIABETES MELLITUS WITHOUT COMPLICATION, WITHOUT LONG-TERM CURRENT USE OF INSULIN (MULTI): ICD-10-CM

## 2024-12-02 DIAGNOSIS — G44.89 OTHER HEADACHE SYNDROME: ICD-10-CM

## 2024-12-02 PROCEDURE — 3078F DIAST BP <80 MM HG: CPT | Performed by: FAMILY MEDICINE

## 2024-12-02 PROCEDURE — 3044F HG A1C LEVEL LT 7.0%: CPT | Performed by: FAMILY MEDICINE

## 2024-12-02 PROCEDURE — 1126F AMNT PAIN NOTED NONE PRSNT: CPT | Performed by: FAMILY MEDICINE

## 2024-12-02 PROCEDURE — G2211 COMPLEX E/M VISIT ADD ON: HCPCS | Performed by: FAMILY MEDICINE

## 2024-12-02 PROCEDURE — 99214 OFFICE O/P EST MOD 30 MIN: CPT | Performed by: FAMILY MEDICINE

## 2024-12-02 PROCEDURE — 1159F MED LIST DOCD IN RCRD: CPT | Performed by: FAMILY MEDICINE

## 2024-12-02 PROCEDURE — 1123F ACP DISCUSS/DSCN MKR DOCD: CPT | Performed by: FAMILY MEDICINE

## 2024-12-02 PROCEDURE — 3074F SYST BP LT 130 MM HG: CPT | Performed by: FAMILY MEDICINE

## 2024-12-02 PROCEDURE — 1036F TOBACCO NON-USER: CPT | Performed by: FAMILY MEDICINE

## 2024-12-02 RX ORDER — METFORMIN HYDROCHLORIDE 500 MG/1
1000 TABLET, EXTENDED RELEASE ORAL 2 TIMES DAILY
Qty: 360 TABLET | Refills: 1 | Status: SHIPPED | OUTPATIENT
Start: 2024-12-02

## 2024-12-02 RX ORDER — OLMESARTAN MEDOXOMIL AND HYDROCHLOROTHIAZIDE 40/25 40; 25 MG/1; MG/1
1 TABLET ORAL DAILY
Qty: 90 TABLET | Refills: 1 | Status: SHIPPED | OUTPATIENT
Start: 2024-12-02

## 2024-12-02 RX ORDER — GLIPIZIDE 2.5 MG/1
2.5 TABLET, EXTENDED RELEASE ORAL
Qty: 90 TABLET | Refills: 3 | Status: SHIPPED | OUTPATIENT
Start: 2024-12-02 | End: 2025-12-02

## 2024-12-02 RX ORDER — TRIAMCINOLONE ACETONIDE 1 MG/G
OINTMENT TOPICAL 2 TIMES DAILY PRN
Qty: 60 G | Refills: 3 | Status: SHIPPED | OUTPATIENT
Start: 2024-12-02 | End: 2025-04-01

## 2024-12-02 RX ORDER — ATORVASTATIN CALCIUM 10 MG/1
10 TABLET, FILM COATED ORAL
Qty: 90 TABLET | Refills: 1 | Status: SHIPPED | OUTPATIENT
Start: 2024-12-02

## 2024-12-02 RX ORDER — OMEPRAZOLE 20 MG/1
20 CAPSULE, DELAYED RELEASE ORAL 2 TIMES DAILY
Qty: 180 CAPSULE | Refills: 1 | Status: SHIPPED | OUTPATIENT
Start: 2024-12-02

## 2024-12-02 ASSESSMENT — PAIN SCALES - GENERAL: PAINLEVEL_OUTOF10: 0-NO PAIN

## 2024-12-02 NOTE — ASSESSMENT & PLAN NOTE
Stable  Refilling meds at same dose.  Reviewed labs.  Continue home BP checks  Recheck in 3 months with new doc.

## 2024-12-02 NOTE — PROGRESS NOTES
Subjective   Patient ID: Naima Babcock is a 69 y.o. female who presents for Follow-up (3 month follow up).    Here for general check and med refill.    Patient has history of hypertension hyperlipidemia diabetes asthma, psoriasis with arthritis   Hx of endometrial cancer and IPMN being monitored  She has recently seen surgical oncology, endocrinology and neurology.  Stable Multifocal branch duct IPMN with the dominant lesions of 1.4 and 1.2 centimeters without any worrisome features or high risk stigmata    Blood sugars are elevated/spiking  Stopped Januvia due to frequent UTI  Blood sugar spikes in the am after wakening.   Diet has been good, holidays were consistent.   Staying active, teaching, working.    Her headaches are stable  Will no longer need to see unless something comes up.   Seeing oncology on Thursday  Home BP checks are great             Review of Systems    Objective   /60   Pulse 67   Temp 36.7 °C (98 °F) (Temporal)   Wt 88.5 kg (195 lb)   SpO2 98%   BMI 33.29 kg/m²     Physical Exam  Vitals and nursing note reviewed.   Constitutional:       Appearance: Normal appearance.   Cardiovascular:      Rate and Rhythm: Normal rate and regular rhythm.   Pulmonary:      Effort: Pulmonary effort is normal.      Breath sounds: Normal breath sounds.   Musculoskeletal:      Cervical back: Normal range of motion.   Neurological:      Mental Status: She is alert.   Psychiatric:         Mood and Affect: Mood normal.         Behavior: Behavior normal.         Thought Content: Thought content normal.         Judgment: Judgment normal.         Assessment/Plan   Problem List Items Addressed This Visit             ICD-10-CM    Hyperlipemia, mixed E78.2     Stable  Refilling meds at the same dose  Recheck with new physician         Relevant Medications    atorvastatin (Lipitor) 10 mg tablet    IPMN (intraductal papillary mucinous neoplasm) D49.0     Stable  Continue to monitor         Benign essential  hypertension - Primary I10     Stable  Refilling meds at same dose.  Reviewed labs.  Continue home BP checks  Recheck in 3 months with new doc.          Relevant Medications    olmesartan-hydrochlorothiazide (BENIcar HCT) 40-25 mg tablet    PA (psoriatic arthritis) (Multi) L40.50     Stable  Refilling topical ointment  Continue care per rheumatology         Relevant Medications    triamcinolone (Kenalog) 0.1 % ointment    Type 2 diabetes mellitus without complication (Multi) E11.9     Stable  Continue care per endocrinology         Relevant Medications    metFORMIN  mg 24 hr tablet    glipiZIDE XL (Glucotrol XL) 2.5 mg 24 hr tablet    Other headache syndrome G44.89     Improved with increased dose of gabapentin.  Reviewed neurology note.           Other Visit Diagnoses         Codes    Encounter for screening mammogram for malignant neoplasm of breast     Z12.31    Relevant Orders    BI mammo bilateral screening tomosynthesis    Gastroesophageal reflux disease without esophagitis     K21.9    Relevant Medications    omeprazole (PriLOSEC) 20 mg DR capsule

## 2024-12-04 NOTE — PROGRESS NOTES
Patient ID: Naima Babcock is a 69 y.o. female.    Subjective    HPI    Referred by Dr. Reddy  PCP is Dr. Maria G Todda     63-year-old with history of endometrial cancer, diagnosed August 2019.     mass history  - 6/19/19 CT abd/pelvis - Heterogeneous appearance of the uterus with suspicion of a myometrial mass. This is not well evaluated with current CT. Pelvic ultrasound is recommended  for further evaluation since endometrial neoplasm cannot be excluded. 2. Numerous cystic changes in the pancreas, one in the pancreatic head and one in the pancreatic tail. Differential diagnosis includes changes from chronic pancreatitis however serous  cystic neoplastic changes cannot be excluded. Correlate with the patient's clinical history, MRI/MRCP can be obtained for further evaluation/characterization if clinically indicated. 3. Mild steatosis hepatis. 4. Probable renal cysts.   -7/3/19 TVUS - The retroflexed uterus measured 7.2 x 3.8 x 4.9 cm. There was heterogeneous myometrium. There was an anterior corpus/fundal hypoechoic solid mass measuring 20 x 13 x 10 mm. There was an additional anterior hypoechoic myometrial nodule measuring  12 x 10 x 10 mm.. The endometrial lining was heterogeneous and thickened with multiple cystic changes. It measured up to 11 mm in AP thickness. There was trace cervical canal fluid... RIGHT OVARY: The right ovary measured 12 x 24 x 16 mm. There was no  suspicious solid or cystic right ovarian lesion. Color Doppler and spectral Doppler showed preserved arterial and venous blood flow in the right ovary. LEFT OVARY: The left ovary measured 20 x 13 x 15 mm. There was no suspicious solid or cystic left ovarian  lesion. Color Doppler and spectral Doppler showed preserved arterial and venous blood flow in the left ovary.  - 07/29/2019, D&C.  Pathology reported for atypical mucinous proliferation cannot exclude mucinous endometrial cancer  - 08/09/2019, case presented at GYN tumor board.   Impression was atypical mucinous proliferation concerning for mucinous endometrial cancer.  Recommendations were for hysterectomy.   - 2019 TLH/BSO sentinel pelvic lymph node dissection.  Final pathology reported as a grade 1 mucinous endometrial cancer with no myometrial invasion.  Negative sentinel lymph nodes.  Mismatch repair testing was normal.  - 2019 -case presented at GYN tumor board.  Impression was stage IA mucinous endometrial cancer/  recommendations were for surveillance, optec, survivorship   - 2021 - biopsy of vaginal lesion, benign     Interval History: Dacia is a 69 year old female s/p TLH/BSO sentinel pelvic lymph node dissection for Stage IA mucinous endometrial cancer on 19. Patient denies any vaginal bleeding or abnormal discharge. Has had bleeding from hemorrhoid. Appetite has been good. Energy levels are baseline. Patient denies urinary leakage or incontinence. She denies any urgency or frequency. Denies any weight loss or weight gain. Does not report any abdominal pain or bloating besides discomfort related to constipation. Occasional RLQ cramping is stable. She moved in with her daughter and is doing well since  from her partner.     Medical History:  Psoriasis  Arthritis  Diabetes  Hypertension     Surgical History:  Tubal ligation   Appendectomy   Breast lumpectomy   Orthopedic surgery, right wrist 2018     Social History:  Denies tobacco, alcohol or illicit drug use.  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  She is . Her  is  of Alzheimer's. She lives with her daughter.     Obstetrics/Gynecology History:  G3, P2,  ×2. SAB ×1  Menopausal since .  Denies history of prior abnormal Pap smears.  Denies postmenopausal bleeding. Denies gynecologic problems.     Family History:  2 sisters had renal cell carcinoma.  There are no family members with breast, uterine, ovarian or colon  malignancy    Objective    BSA: 2.02 meters squared  /75   Pulse 72   Temp 36 °C (96.8 °F)   Resp 18   Wt 89.8 kg (197 lb 15.6 oz)   SpO2 98%   BMI 33.80 kg/m²      Physical Exam  Vitals and nursing note reviewed.   Constitutional:       Appearance: Normal appearance. She is normal weight.   HENT:      Mouth/Throat:      Mouth: Mucous membranes are moist.      Pharynx: Oropharynx is clear.   Eyes:      Conjunctiva/sclera: Conjunctivae normal.      Pupils: Pupils are equal, round, and reactive to light.   Cardiovascular:      Rate and Rhythm: Normal rate and regular rhythm.   Pulmonary:      Effort: Pulmonary effort is normal.      Breath sounds: Normal breath sounds.   Abdominal:      General: Abdomen is flat. There is no distension.      Palpations: Abdomen is soft. There is no mass.      Tenderness: There is no abdominal tenderness.   Genitourinary:     General: Normal vulva.      Vagina: Normal.      Uterus: Absent.       Rectum: Normal.      Comments: Stable psoriatic lesion at vaginal apex, previously biopsied. Noted to be stable for several years.  Musculoskeletal:         General: Normal range of motion.   Skin:     General: Skin is warm and dry.   Neurological:      Mental Status: She is alert.   Psychiatric:         Mood and Affect: Mood normal.         Behavior: Behavior normal.       Performance Status:  Asymptomatic      Assessment/Plan     Dacia is a 69 year old female s/p TLH/BSO sentinel pelvic lymph node dissection for Stage IA mucinous endometrial cancer on 8/29/19.     Plan:    Physical examination was within normal limits today.  She is currently MORRIS.  We reviewed signs and symptoms of possible recurrence with the patient and she will call  our office should she experience any of these.     Follow up in 1 year for surveillance visit, can start annual visits after    Order placed for mammogram in January     Instructed to call office with any concerns    Refill Acyclovir sent to  pharmacy    Standing order for UA if patient develops UTI symptoms or hematuria        Dhara Newberry, APRN-CNP

## 2024-12-05 ENCOUNTER — OFFICE VISIT (OUTPATIENT)
Dept: GYNECOLOGIC ONCOLOGY | Facility: CLINIC | Age: 69
End: 2024-12-05
Payer: MEDICARE

## 2024-12-05 VITALS
DIASTOLIC BLOOD PRESSURE: 75 MMHG | RESPIRATION RATE: 18 BRPM | OXYGEN SATURATION: 98 % | BODY MASS INDEX: 33.8 KG/M2 | WEIGHT: 197.97 LBS | TEMPERATURE: 96.8 F | HEART RATE: 72 BPM | SYSTOLIC BLOOD PRESSURE: 116 MMHG

## 2024-12-05 DIAGNOSIS — B00.9 HSV (HERPES SIMPLEX VIRUS) INFECTION: ICD-10-CM

## 2024-12-05 DIAGNOSIS — C54.1 ENDOMETRIAL CARCINOMA (MULTI): Primary | ICD-10-CM

## 2024-12-05 DIAGNOSIS — R35.0 URINARY FREQUENCY: ICD-10-CM

## 2024-12-05 PROCEDURE — 3044F HG A1C LEVEL LT 7.0%: CPT | Performed by: NURSE PRACTITIONER

## 2024-12-05 PROCEDURE — 3074F SYST BP LT 130 MM HG: CPT | Performed by: NURSE PRACTITIONER

## 2024-12-05 PROCEDURE — 99214 OFFICE O/P EST MOD 30 MIN: CPT | Performed by: NURSE PRACTITIONER

## 2024-12-05 PROCEDURE — 3078F DIAST BP <80 MM HG: CPT | Performed by: NURSE PRACTITIONER

## 2024-12-05 PROCEDURE — G2211 COMPLEX E/M VISIT ADD ON: HCPCS | Performed by: NURSE PRACTITIONER

## 2024-12-05 PROCEDURE — 1123F ACP DISCUSS/DSCN MKR DOCD: CPT | Performed by: NURSE PRACTITIONER

## 2024-12-05 PROCEDURE — 1159F MED LIST DOCD IN RCRD: CPT | Performed by: NURSE PRACTITIONER

## 2024-12-05 PROCEDURE — 1126F AMNT PAIN NOTED NONE PRSNT: CPT | Performed by: NURSE PRACTITIONER

## 2024-12-05 RX ORDER — VALACYCLOVIR HYDROCHLORIDE 500 MG/1
500 TABLET, FILM COATED ORAL 2 TIMES DAILY
Qty: 14 TABLET | Refills: 0 | Status: SHIPPED | OUTPATIENT
Start: 2024-12-05 | End: 2024-12-12

## 2024-12-05 ASSESSMENT — PAIN SCALES - GENERAL: PAINLEVEL_OUTOF10: 0-NO PAIN

## 2024-12-18 ENCOUNTER — APPOINTMENT (OUTPATIENT)
Dept: PRIMARY CARE | Facility: CLINIC | Age: 69
End: 2024-12-18
Payer: MEDICARE

## 2025-01-20 ENCOUNTER — APPOINTMENT (OUTPATIENT)
Dept: RADIOLOGY | Facility: CLINIC | Age: 70
End: 2025-01-20
Payer: MEDICARE

## 2025-02-05 ENCOUNTER — HOSPITAL ENCOUNTER (OUTPATIENT)
Dept: RADIOLOGY | Facility: CLINIC | Age: 70
Discharge: HOME | End: 2025-02-05
Payer: MEDICARE

## 2025-02-05 VITALS — BODY MASS INDEX: 33.8 KG/M2 | WEIGHT: 197.97 LBS | HEIGHT: 64 IN

## 2025-02-05 DIAGNOSIS — Z12.31 SCREENING MAMMOGRAM, ENCOUNTER FOR: ICD-10-CM

## 2025-02-05 PROCEDURE — 77067 SCR MAMMO BI INCL CAD: CPT | Performed by: RADIOLOGY

## 2025-02-05 PROCEDURE — 77063 BREAST TOMOSYNTHESIS BI: CPT | Performed by: RADIOLOGY

## 2025-02-05 PROCEDURE — 77067 SCR MAMMO BI INCL CAD: CPT

## 2025-02-17 ENCOUNTER — OFFICE VISIT (OUTPATIENT)
Dept: URGENT CARE | Age: 70
End: 2025-02-17
Payer: MEDICARE

## 2025-02-17 ENCOUNTER — APPOINTMENT (OUTPATIENT)
Dept: RADIOLOGY | Facility: HOSPITAL | Age: 70
End: 2025-02-17
Payer: MEDICARE

## 2025-02-17 ENCOUNTER — HOSPITAL ENCOUNTER (OUTPATIENT)
Facility: HOSPITAL | Age: 70
Setting detail: OBSERVATION
Discharge: HOME | End: 2025-02-18
Attending: EMERGENCY MEDICINE | Admitting: STUDENT IN AN ORGANIZED HEALTH CARE EDUCATION/TRAINING PROGRAM
Payer: MEDICARE

## 2025-02-17 VITALS
HEART RATE: 85 BPM | BODY MASS INDEX: 33.29 KG/M2 | DIASTOLIC BLOOD PRESSURE: 72 MMHG | WEIGHT: 195 LBS | OXYGEN SATURATION: 96 % | SYSTOLIC BLOOD PRESSURE: 135 MMHG

## 2025-02-17 DIAGNOSIS — K86.9 PANCREATIC LESION (HHS-HCC): ICD-10-CM

## 2025-02-17 DIAGNOSIS — R10.9 ABDOMINAL PAIN, UNSPECIFIED ABDOMINAL LOCATION: Primary | ICD-10-CM

## 2025-02-17 DIAGNOSIS — M54.50 CHRONIC BILATERAL LOW BACK PAIN, UNSPECIFIED WHETHER SCIATICA PRESENT: ICD-10-CM

## 2025-02-17 DIAGNOSIS — G89.29 CHRONIC BILATERAL LOW BACK PAIN, UNSPECIFIED WHETHER SCIATICA PRESENT: ICD-10-CM

## 2025-02-17 PROBLEM — R52 INTRACTABLE PAIN: Status: ACTIVE | Noted: 2025-02-17

## 2025-02-17 LAB
ALBUMIN SERPL BCP-MCNC: 4 G/DL (ref 3.4–5)
ALP SERPL-CCNC: 89 U/L (ref 33–136)
ALT SERPL W P-5'-P-CCNC: 11 U/L (ref 7–45)
ANION GAP SERPL CALC-SCNC: 15 MMOL/L (ref 10–20)
APPEARANCE UR: CLEAR
AST SERPL W P-5'-P-CCNC: 11 U/L (ref 9–39)
BASOPHILS # BLD AUTO: 0.12 X10*3/UL (ref 0–0.1)
BASOPHILS NFR BLD AUTO: 1.2 %
BILIRUB SERPL-MCNC: 0.3 MG/DL (ref 0–1.2)
BILIRUB UR STRIP.AUTO-MCNC: NEGATIVE MG/DL
BUN SERPL-MCNC: 27 MG/DL (ref 6–23)
CALCIUM SERPL-MCNC: 9.5 MG/DL (ref 8.6–10.3)
CHLORIDE SERPL-SCNC: 107 MMOL/L (ref 98–107)
CO2 SERPL-SCNC: 24 MMOL/L (ref 21–32)
COLOR UR: ABNORMAL
CREAT SERPL-MCNC: 0.91 MG/DL (ref 0.5–1.05)
EGFRCR SERPLBLD CKD-EPI 2021: 68 ML/MIN/1.73M*2
EOSINOPHIL # BLD AUTO: 0.26 X10*3/UL (ref 0–0.4)
EOSINOPHIL NFR BLD AUTO: 2.5 %
ERYTHROCYTE [DISTWIDTH] IN BLOOD BY AUTOMATED COUNT: 14.6 % (ref 11.5–14.5)
FLUAV RNA RESP QL NAA+PROBE: NOT DETECTED
FLUBV RNA RESP QL NAA+PROBE: NOT DETECTED
GLUCOSE SERPL-MCNC: 124 MG/DL (ref 74–99)
GLUCOSE UR STRIP.AUTO-MCNC: ABNORMAL MG/DL
HCT VFR BLD AUTO: 36.4 % (ref 36–46)
HGB BLD-MCNC: 11.2 G/DL (ref 12–16)
HYALINE CASTS #/AREA URNS AUTO: ABNORMAL /LPF
IMM GRANULOCYTES # BLD AUTO: 0.02 X10*3/UL (ref 0–0.5)
IMM GRANULOCYTES NFR BLD AUTO: 0.2 % (ref 0–0.9)
KETONES UR STRIP.AUTO-MCNC: NEGATIVE MG/DL
LACTATE SERPL-SCNC: 0.8 MMOL/L (ref 0.4–2)
LEUKOCYTE ESTERASE UR QL STRIP.AUTO: ABNORMAL
LIPASE SERPL-CCNC: 16 U/L (ref 9–82)
LYMPHOCYTES # BLD AUTO: 3.46 X10*3/UL (ref 0.8–3)
LYMPHOCYTES NFR BLD AUTO: 33.4 %
MCH RBC QN AUTO: 25.9 PG (ref 26–34)
MCHC RBC AUTO-ENTMCNC: 30.8 G/DL (ref 32–36)
MCV RBC AUTO: 84 FL (ref 80–100)
MONOCYTES # BLD AUTO: 0.64 X10*3/UL (ref 0.05–0.8)
MONOCYTES NFR BLD AUTO: 6.2 %
MUCOUS THREADS #/AREA URNS AUTO: ABNORMAL /LPF
NEUTROPHILS # BLD AUTO: 5.85 X10*3/UL (ref 1.6–5.5)
NEUTROPHILS NFR BLD AUTO: 56.5 %
NITRITE UR QL STRIP.AUTO: NEGATIVE
NRBC BLD-RTO: 0 /100 WBCS (ref 0–0)
PH UR STRIP.AUTO: 6.5 [PH]
PLATELET # BLD AUTO: 410 X10*3/UL (ref 150–450)
POC APPEARANCE, URINE: CLEAR
POC BILIRUBIN, URINE: NEGATIVE
POC BLOOD, URINE: NEGATIVE
POC COLOR, URINE: YELLOW
POC GLUCOSE, URINE: NEGATIVE MG/DL
POC KETONES, URINE: NEGATIVE MG/DL
POC LEUKOCYTES, URINE: NEGATIVE
POC NITRITE,URINE: NEGATIVE
POC PH, URINE: 6 PH
POC PROTEIN, URINE: NEGATIVE MG/DL
POC SPECIFIC GRAVITY, URINE: 1.01
POC UROBILINOGEN, URINE: 0.2 EU/DL
POTASSIUM SERPL-SCNC: 3.5 MMOL/L (ref 3.5–5.3)
PROT SERPL-MCNC: 7 G/DL (ref 6.4–8.2)
PROT UR STRIP.AUTO-MCNC: NEGATIVE MG/DL
RBC # BLD AUTO: 4.32 X10*6/UL (ref 4–5.2)
RBC # UR STRIP.AUTO: NEGATIVE MG/DL
RBC #/AREA URNS AUTO: ABNORMAL /HPF
SARS-COV-2 RNA RESP QL NAA+PROBE: NOT DETECTED
SODIUM SERPL-SCNC: 142 MMOL/L (ref 136–145)
SP GR UR STRIP.AUTO: 1.03
SQUAMOUS #/AREA URNS AUTO: ABNORMAL /HPF
UROBILINOGEN UR STRIP.AUTO-MCNC: NORMAL MG/DL
WBC # BLD AUTO: 10.4 X10*3/UL (ref 4.4–11.3)
WBC #/AREA URNS AUTO: ABNORMAL /HPF

## 2025-02-17 PROCEDURE — 87086 URINE CULTURE/COLONY COUNT: CPT | Mod: AHULAB | Performed by: PHYSICIAN ASSISTANT

## 2025-02-17 PROCEDURE — 96375 TX/PRO/DX INJ NEW DRUG ADDON: CPT

## 2025-02-17 PROCEDURE — 87636 SARSCOV2 & INF A&B AMP PRB: CPT | Performed by: EMERGENCY MEDICINE

## 2025-02-17 PROCEDURE — 81001 URINALYSIS AUTO W/SCOPE: CPT | Performed by: PHYSICIAN ASSISTANT

## 2025-02-17 PROCEDURE — G0378 HOSPITAL OBSERVATION PER HR: HCPCS

## 2025-02-17 PROCEDURE — 80053 COMPREHEN METABOLIC PANEL: CPT | Performed by: PHYSICIAN ASSISTANT

## 2025-02-17 PROCEDURE — 83605 ASSAY OF LACTIC ACID: CPT | Performed by: EMERGENCY MEDICINE

## 2025-02-17 PROCEDURE — 36415 COLL VENOUS BLD VENIPUNCTURE: CPT | Performed by: EMERGENCY MEDICINE

## 2025-02-17 PROCEDURE — 74177 CT ABD & PELVIS W/CONTRAST: CPT | Performed by: STUDENT IN AN ORGANIZED HEALTH CARE EDUCATION/TRAINING PROGRAM

## 2025-02-17 PROCEDURE — 2500000004 HC RX 250 GENERAL PHARMACY W/ HCPCS (ALT 636 FOR OP/ED): Performed by: EMERGENCY MEDICINE

## 2025-02-17 PROCEDURE — 99223 1ST HOSP IP/OBS HIGH 75: CPT | Performed by: STUDENT IN AN ORGANIZED HEALTH CARE EDUCATION/TRAINING PROGRAM

## 2025-02-17 PROCEDURE — 99285 EMERGENCY DEPT VISIT HI MDM: CPT | Mod: 25 | Performed by: EMERGENCY MEDICINE

## 2025-02-17 PROCEDURE — 83690 ASSAY OF LIPASE: CPT | Performed by: PHYSICIAN ASSISTANT

## 2025-02-17 PROCEDURE — 74177 CT ABD & PELVIS W/CONTRAST: CPT

## 2025-02-17 PROCEDURE — 96365 THER/PROPH/DIAG IV INF INIT: CPT

## 2025-02-17 PROCEDURE — 36415 COLL VENOUS BLD VENIPUNCTURE: CPT | Performed by: PHYSICIAN ASSISTANT

## 2025-02-17 PROCEDURE — 2550000001 HC RX 255 CONTRASTS: Performed by: EMERGENCY MEDICINE

## 2025-02-17 PROCEDURE — 96376 TX/PRO/DX INJ SAME DRUG ADON: CPT

## 2025-02-17 PROCEDURE — 85025 COMPLETE CBC W/AUTO DIFF WBC: CPT | Performed by: PHYSICIAN ASSISTANT

## 2025-02-17 RX ORDER — CEFTRIAXONE 1 G/50ML
1 INJECTION, SOLUTION INTRAVENOUS ONCE
Status: COMPLETED | OUTPATIENT
Start: 2025-02-17 | End: 2025-02-17

## 2025-02-17 RX ORDER — OXYCODONE HYDROCHLORIDE 5 MG/1
5 TABLET ORAL EVERY 4 HOURS PRN
Status: DISCONTINUED | OUTPATIENT
Start: 2025-02-17 | End: 2025-02-18

## 2025-02-17 RX ORDER — CALCIUM CARBONATE 200(500)MG
500 TABLET,CHEWABLE ORAL 4 TIMES DAILY PRN
Status: DISCONTINUED | OUTPATIENT
Start: 2025-02-17 | End: 2025-02-18

## 2025-02-17 RX ORDER — CEFTRIAXONE 1 G/50ML
1 INJECTION, SOLUTION INTRAVENOUS EVERY 24 HOURS
Status: DISCONTINUED | OUTPATIENT
Start: 2025-02-18 | End: 2025-02-18 | Stop reason: HOSPADM

## 2025-02-17 RX ORDER — DOCUSATE SODIUM 100 MG/1
100 CAPSULE, LIQUID FILLED ORAL 2 TIMES DAILY PRN
Status: DISCONTINUED | OUTPATIENT
Start: 2025-02-17 | End: 2025-02-18

## 2025-02-17 RX ORDER — TALC
3 POWDER (GRAM) TOPICAL NIGHTLY PRN
Status: DISCONTINUED | OUTPATIENT
Start: 2025-02-17 | End: 2025-02-18 | Stop reason: HOSPADM

## 2025-02-17 RX ORDER — POLYETHYLENE GLYCOL 3350 17 G/17G
17 POWDER, FOR SOLUTION ORAL DAILY
Status: DISCONTINUED | OUTPATIENT
Start: 2025-02-18 | End: 2025-02-18 | Stop reason: HOSPADM

## 2025-02-17 RX ORDER — SIMETHICONE 80 MG
80 TABLET,CHEWABLE ORAL 4 TIMES DAILY PRN
Status: DISCONTINUED | OUTPATIENT
Start: 2025-02-17 | End: 2025-02-18 | Stop reason: HOSPADM

## 2025-02-17 RX ORDER — ONDANSETRON HYDROCHLORIDE 2 MG/ML
4 INJECTION, SOLUTION INTRAVENOUS EVERY 4 HOURS PRN
Status: DISCONTINUED | OUTPATIENT
Start: 2025-02-17 | End: 2025-02-18 | Stop reason: HOSPADM

## 2025-02-17 RX ORDER — ENOXAPARIN SODIUM 100 MG/ML
40 INJECTION SUBCUTANEOUS EVERY 24 HOURS
Status: DISCONTINUED | OUTPATIENT
Start: 2025-02-18 | End: 2025-02-18 | Stop reason: HOSPADM

## 2025-02-17 RX ORDER — INSULIN LISPRO 100 [IU]/ML
0-5 INJECTION, SOLUTION INTRAVENOUS; SUBCUTANEOUS
Status: DISCONTINUED | OUTPATIENT
Start: 2025-02-18 | End: 2025-02-18 | Stop reason: HOSPADM

## 2025-02-17 RX ORDER — HYDROMORPHONE HYDROCHLORIDE 0.2 MG/ML
0.2 INJECTION INTRAMUSCULAR; INTRAVENOUS; SUBCUTANEOUS ONCE
Status: COMPLETED | OUTPATIENT
Start: 2025-02-17 | End: 2025-02-17

## 2025-02-17 RX ORDER — OXYCODONE HYDROCHLORIDE 5 MG/1
10 TABLET ORAL EVERY 4 HOURS PRN
Status: DISCONTINUED | OUTPATIENT
Start: 2025-02-17 | End: 2025-02-18

## 2025-02-17 RX ORDER — DIPHENHYDRAMINE HCL 25 MG
25 CAPSULE ORAL EVERY 6 HOURS PRN
Status: DISCONTINUED | OUTPATIENT
Start: 2025-02-17 | End: 2025-02-18 | Stop reason: HOSPADM

## 2025-02-17 RX ORDER — ACETAMINOPHEN 325 MG/1
650 TABLET ORAL EVERY 6 HOURS PRN
Status: DISCONTINUED | OUTPATIENT
Start: 2025-02-17 | End: 2025-02-18 | Stop reason: HOSPADM

## 2025-02-17 RX ORDER — ONDANSETRON HYDROCHLORIDE 2 MG/ML
4 INJECTION, SOLUTION INTRAVENOUS ONCE
Status: COMPLETED | OUTPATIENT
Start: 2025-02-17 | End: 2025-02-17

## 2025-02-17 RX ADMIN — HYDROMORPHONE HYDROCHLORIDE 0.4 MG: 1 INJECTION, SOLUTION INTRAMUSCULAR; INTRAVENOUS; SUBCUTANEOUS at 19:26

## 2025-02-17 RX ADMIN — ONDANSETRON 4 MG: 2 INJECTION, SOLUTION INTRAMUSCULAR; INTRAVENOUS at 16:47

## 2025-02-17 RX ADMIN — HYDROMORPHONE HYDROCHLORIDE 0.2 MG: 0.2 INJECTION, SOLUTION INTRAMUSCULAR; INTRAVENOUS; SUBCUTANEOUS at 16:48

## 2025-02-17 RX ADMIN — CEFTRIAXONE SODIUM 1 G: 1 INJECTION, SOLUTION INTRAVENOUS at 20:11

## 2025-02-17 RX ADMIN — IOHEXOL 75 ML: 350 INJECTION, SOLUTION INTRAVENOUS at 17:22

## 2025-02-17 ASSESSMENT — PAIN SCALES - GENERAL
PAINLEVEL_OUTOF10: 0 - NO PAIN
PAINLEVEL_OUTOF10: 8
PAINLEVEL_OUTOF10: 9

## 2025-02-17 ASSESSMENT — PAIN DESCRIPTION - LOCATION
LOCATION: ABDOMEN
LOCATION: ABDOMEN

## 2025-02-17 ASSESSMENT — PAIN DESCRIPTION - PAIN TYPE: TYPE: ACUTE PAIN

## 2025-02-17 ASSESSMENT — PAIN DESCRIPTION - FREQUENCY: FREQUENCY: INTERMITTENT

## 2025-02-17 ASSESSMENT — PAIN DESCRIPTION - DESCRIPTORS
DESCRIPTORS: CRAMPING;SPASM
DESCRIPTORS: SHARP

## 2025-02-17 ASSESSMENT — PAIN DESCRIPTION - ONSET: ONSET: GRADUAL

## 2025-02-17 ASSESSMENT — ENCOUNTER SYMPTOMS
BACK PAIN: 1
ABDOMINAL PAIN: 1
CONSTITUTIONAL NEGATIVE: 1

## 2025-02-17 ASSESSMENT — PAIN DESCRIPTION - ORIENTATION
ORIENTATION: RIGHT
ORIENTATION: RIGHT;LOWER

## 2025-02-17 ASSESSMENT — PAIN DESCRIPTION - PROGRESSION: CLINICAL_PROGRESSION: NOT CHANGED

## 2025-02-17 ASSESSMENT — PAIN - FUNCTIONAL ASSESSMENT: PAIN_FUNCTIONAL_ASSESSMENT: 0-10

## 2025-02-17 NOTE — ED TRIAGE NOTES
" TRIAGE NOTE   I saw the patient as the Clinician in Triage and performed a brief history and physical exam, established acuity, and ordered appropriate tests to develop basic plan of care. Patient will be seen by an [facility], resident and/or physician who will independently evaluate the patient. Please see subsequent provider notes for further details and disposition.     Brief HPI: In brief, Dacia Babcock \"Allyssa" is a 69 y.o. female that presents for abdominal pain and right lower quadrant pain.  Patient reports started a few days ago.  She has a history of total hysterectomy due to uterine cancer and has had no recurrent issues.  She went to urgent care and noted that her urine was free of infection.  She reports no fevers, chills, or changes to stool.    Focused Physical exam:   Suprapubic tenderness to palpation.    Plan/MDM:   Labs and CT ordered     Please see subsequent provider note for further details and disposition   "

## 2025-02-17 NOTE — PROGRESS NOTES
" I just put a note in saying that she she denied it so so then there Subjective   Patient ID: Dacia Babcock \"Allyssa" is a 69 y.o. female. They present today with a chief complaint of Back Pain (X3days, lower back pain/).    History of Present Illness  Lower abdominal pain for 3 days.  It is starting to radiate to the right lower back.  Denies any other symptoms.  History of abdominal surgery in the past.  Patient denies any chronic back problems.      History provided by:  Patient   used: No    Back Pain  Associated symptoms include abdominal pain.       Past Medical History  Allergies as of 02/17/2025 - Reviewed 02/17/2025   Allergen Reaction Noted    Amoxicillin-pot clavulanate Unknown 02/24/2023    Calcipotriene Unknown 02/24/2023    Ephedrine Other 02/24/2023    Tetracycline GI Upset 04/27/2011    Propoxyphene-acetaminophen Itching and Rash 02/24/2023       (Not in a hospital admission)       Past Medical History:   Diagnosis Date    Breast cyst 1990    Candidal stomatitis 07/06/2021    Thrush    Endometrial cancer (Multi) 2019    Erythema intertrigo 10/30/2020    Intertrigo    Essential (primary) hypertension     Benign essential hypertension    Fibrocystic breast 2015    Herpes zoster 07/24/2024    Other specified abnormal findings of blood chemistry 12/01/2021    D-dimer, elevated    Other specified diseases of pancreas 10/23/2019    Pancreatic mass    Other tear of medial meniscus, current injury, right knee, initial encounter 06/25/2021    Acute medial meniscus tear of right knee, initial encounter    Other tear of medial meniscus, current injury, right knee, subsequent encounter 12/04/2020    Tear of medial meniscus of right knee, current, unspecified tear type, subsequent encounter    Other tear of medial meniscus, current injury, unspecified knee, initial encounter 11/13/2020    Tear of medial meniscus of knee    Personal history of neoplasm of uncertain behavior     History of " neoplasm of uncertain behavior    Personal history of other diseases of the nervous system and sense organs 11/06/2015    History of impacted cerumen    Personal history of other diseases of the nervous system and sense organs 02/05/2020    History of tremor    Personal history of other diseases of the respiratory system     Personal history of asthma    Personal history of other endocrine, nutritional and metabolic disease     History of hypoglycemia    Personal history of other infectious and parasitic diseases 06/13/2015    History of herpes zoster    Personal history of other mental and behavioral disorders 10/02/2013    History of anxiety disorder    Personal history of other specified conditions 02/08/2014    History of wheezing    Plantar fascial fibromatosis 01/08/2019    Plantar fasciitis    Trigger finger, unspecified finger 03/18/2022    Trigger finger, acquired    Trochanteric bursitis, left hip 06/23/2017    Trochanteric bursitis of left hip    Unspecified fracture of shaft of unspecified radius, initial encounter for closed fracture 04/02/2018    Fracture, radius, shaft    Unspecified internal derangement of right knee 08/24/2021    Internal derangement of right knee       Past Surgical History:   Procedure Laterality Date    APPENDECTOMY  10/02/2013    Appendectomy    BREAST BIOPSY  2013    BREAST CYST EXCISION  2013    BREAST LUMPECTOMY  10/02/2013    Breast Surgery Lumpectomy    CT ANGIO NECK  03/23/2023    CT NECK ANGIO W AND WO IV CONTRAST [facility]    CT HEAD ANGIO W AND WO IV CONTRAST  03/23/2023    CT HEAD ANGIO W AND WO IV CONTRAST [facility] CT    HYSTERECTOMY  2019    MR HEAD ANGIO WO IV CONTRAST  11/11/2020    MR HEAD ANGIO WO IV CONTRAST 11/11/2020 [facility]    OTHER SURGICAL HISTORY  10/02/2013    Anal Fissurectomy    OTHER SURGICAL HISTORY  10/22/2019    Hysterectomy    OTHER SURGICAL HISTORY  10/22/2019    Wrist fracture repair    OTHER SURGICAL HISTORY  04/02/2021    Knee arthroscopy     OTHER SURGICAL HISTORY  04/02/2021    Salpingo-oophorectomy bilateral    TUBAL LIGATION  10/02/2013    Tubal Ligation        reports that she has never smoked. She has never been exposed to tobacco smoke. She has never used smokeless tobacco. She reports that she does not currently use alcohol. She reports that she does not currently use drugs.    Review of Systems  Review of Systems   Constitutional: Negative.    Gastrointestinal:  Positive for abdominal pain.   Musculoskeletal:  Positive for back pain.                                  Objective    Vitals:    02/17/25 1206   BP: 135/72   Pulse: 85   SpO2: 96%   Weight: 88.5 kg (195 lb)     No LMP recorded. Patient is postmenopausal.    Physical Exam  Vitals and nursing note reviewed.   Constitutional:       Comments: Uncomfortable appearing 69-year-old female.  Gait is somewhat stooped over and antalgic.   Cardiovascular:      Rate and Rhythm: Normal rate.   Pulmonary:      Effort: Pulmonary effort is normal. No respiratory distress.   Abdominal:      Tenderness: There is abdominal tenderness. There is no guarding.      Comments: Abdomen is soft rounded.  Left greater than right lower pelvic tenderness.  Minimal right lower back tenderness inferior to the CVA.   Skin:     General: Skin is warm and dry.   Neurological:      General: No focal deficit present.      Mental Status: She is oriented to person, place, and time.   Psychiatric:         Mood and Affect: Mood normal.         Behavior: Behavior normal.         Procedures    Point of Care Test & Imaging Results from this visit  Results for orders placed or performed in visit on 02/17/25   POCT UA Automated manually resulted   Result Value Ref Range    POC Color, Urine Yellow Straw, Yellow, Light-Yellow    POC Appearance, Urine Clear Clear    POC Glucose, Urine NEGATIVE NEGATIVE mg/dl    POC Bilirubin, Urine NEGATIVE NEGATIVE    POC Ketones, Urine NEGATIVE NEGATIVE mg/dl    POC Specific Gravity, Urine 1.015  1.005 - 1.035    POC Blood, Urine NEGATIVE NEGATIVE    POC PH, Urine 6.0 No Reference Range Established PH    POC Protein, Urine NEGATIVE NEGATIVE mg/dl    POC Urobilinogen, Urine 0.2 0.2, 1.0 EU/DL    Poc Nitrite, Urine NEGATIVE NEGATIVE    POC Leukocytes, Urine NEGATIVE NEGATIVE      No results found.    Diagnostic study results (if any) were reviewed by Catherine Fuentes PA-C.    Assessment/Plan   Allergies, medications, history, and pertinent labs/EKGs/Imaging reviewed by Catherine Fuentes PA-C.     Medical Decision Making  Patient tells me she came to urgent care to have her urine checked as she was hoping it was a urinary tract infection.  Urinalysis was negative.  She is mostly here for lower abdominal pain that is starting to radiate to the right flank.  Differential diagnosis includes small bowel obstruction, diverticulitis, other possible surgical etiology.  Patient agrees to go to the emergency department for evaluation and treatment at a higher level of care    Orders and Diagnoses  Diagnoses and all orders for this visit:  Chronic bilateral low back pain, unspecified whether sciatica present  -     POCT UA Automated manually resulted      Medical Admin Record      Patient disposition: ED    Electronically signed by Catherine Fuentes PA-C  1:08 PM

## 2025-02-17 NOTE — ED TRIAGE NOTES
Patient presents to ED for lower abd pain with radiation to her lower back. Patient states she thinks it is related to her bladder with radiation to the right side. When patient urinates she can feel some spasms. The pain has been gradual. BM every day with soft, formed stool. Patient states she has nausea related to pain. Denies fever/chills.

## 2025-02-18 ENCOUNTER — APPOINTMENT (OUTPATIENT)
Dept: RADIOLOGY | Facility: HOSPITAL | Age: 70
End: 2025-02-18
Payer: MEDICARE

## 2025-02-18 VITALS
OXYGEN SATURATION: 96 % | SYSTOLIC BLOOD PRESSURE: 134 MMHG | TEMPERATURE: 96.5 F | WEIGHT: 192 LBS | BODY MASS INDEX: 32.78 KG/M2 | DIASTOLIC BLOOD PRESSURE: 67 MMHG | HEIGHT: 64 IN | HEART RATE: 71 BPM | RESPIRATION RATE: 17 BRPM

## 2025-02-18 LAB
ANION GAP SERPL CALC-SCNC: 10 MMOL/L (ref 10–20)
BACTERIA UR CULT: ABNORMAL
BUN SERPL-MCNC: 25 MG/DL (ref 6–23)
CALCIUM SERPL-MCNC: 8.9 MG/DL (ref 8.6–10.3)
CHLORIDE SERPL-SCNC: 106 MMOL/L (ref 98–107)
CO2 SERPL-SCNC: 27 MMOL/L (ref 21–32)
CREAT SERPL-MCNC: 0.79 MG/DL (ref 0.5–1.05)
EGFRCR SERPLBLD CKD-EPI 2021: 80 ML/MIN/1.73M*2
ERYTHROCYTE [DISTWIDTH] IN BLOOD BY AUTOMATED COUNT: 14.7 % (ref 11.5–14.5)
GLUCOSE BLD MANUAL STRIP-MCNC: 100 MG/DL (ref 74–99)
GLUCOSE BLD MANUAL STRIP-MCNC: 118 MG/DL (ref 74–99)
GLUCOSE BLD MANUAL STRIP-MCNC: 98 MG/DL (ref 74–99)
GLUCOSE SERPL-MCNC: 128 MG/DL (ref 74–99)
HCT VFR BLD AUTO: 34.3 % (ref 36–46)
HGB BLD-MCNC: 10.3 G/DL (ref 12–16)
HOLD SPECIMEN: NORMAL
MCH RBC QN AUTO: 25.9 PG (ref 26–34)
MCHC RBC AUTO-ENTMCNC: 30 G/DL (ref 32–36)
MCV RBC AUTO: 86 FL (ref 80–100)
NRBC BLD-RTO: 0 /100 WBCS (ref 0–0)
PLATELET # BLD AUTO: 331 X10*3/UL (ref 150–450)
POTASSIUM SERPL-SCNC: 3.4 MMOL/L (ref 3.5–5.3)
RBC # BLD AUTO: 3.98 X10*6/UL (ref 4–5.2)
SODIUM SERPL-SCNC: 140 MMOL/L (ref 136–145)
WBC # BLD AUTO: 9.8 X10*3/UL (ref 4.4–11.3)

## 2025-02-18 PROCEDURE — 2500000002 HC RX 250 W HCPCS SELF ADMINISTERED DRUGS (ALT 637 FOR MEDICARE OP, ALT 636 FOR OP/ED): Mod: MUE

## 2025-02-18 PROCEDURE — 85027 COMPLETE CBC AUTOMATED: CPT | Performed by: STUDENT IN AN ORGANIZED HEALTH CARE EDUCATION/TRAINING PROGRAM

## 2025-02-18 PROCEDURE — 2500000002 HC RX 250 W HCPCS SELF ADMINISTERED DRUGS (ALT 637 FOR MEDICARE OP, ALT 636 FOR OP/ED): Performed by: NURSE PRACTITIONER

## 2025-02-18 PROCEDURE — 82374 ASSAY BLOOD CARBON DIOXIDE: CPT | Performed by: STUDENT IN AN ORGANIZED HEALTH CARE EDUCATION/TRAINING PROGRAM

## 2025-02-18 PROCEDURE — 76376 3D RENDER W/INTRP POSTPROCES: CPT | Performed by: RADIOLOGY

## 2025-02-18 PROCEDURE — G0378 HOSPITAL OBSERVATION PER HR: HCPCS

## 2025-02-18 PROCEDURE — 2500000001 HC RX 250 WO HCPCS SELF ADMINISTERED DRUGS (ALT 637 FOR MEDICARE OP): Performed by: INTERNAL MEDICINE

## 2025-02-18 PROCEDURE — 2500000001 HC RX 250 WO HCPCS SELF ADMINISTERED DRUGS (ALT 637 FOR MEDICARE OP)

## 2025-02-18 PROCEDURE — 96376 TX/PRO/DX INJ SAME DRUG ADON: CPT

## 2025-02-18 PROCEDURE — 74183 MRI ABD W/O CNTR FLWD CNTR: CPT | Performed by: RADIOLOGY

## 2025-02-18 PROCEDURE — 36415 COLL VENOUS BLD VENIPUNCTURE: CPT | Performed by: STUDENT IN AN ORGANIZED HEALTH CARE EDUCATION/TRAINING PROGRAM

## 2025-02-18 PROCEDURE — 2550000001 HC RX 255 CONTRASTS: Performed by: INTERNAL MEDICINE

## 2025-02-18 PROCEDURE — 2500000004 HC RX 250 GENERAL PHARMACY W/ HCPCS (ALT 636 FOR OP/ED): Performed by: NURSE PRACTITIONER

## 2025-02-18 PROCEDURE — 82947 ASSAY GLUCOSE BLOOD QUANT: CPT

## 2025-02-18 PROCEDURE — 2500000001 HC RX 250 WO HCPCS SELF ADMINISTERED DRUGS (ALT 637 FOR MEDICARE OP): Performed by: STUDENT IN AN ORGANIZED HEALTH CARE EDUCATION/TRAINING PROGRAM

## 2025-02-18 PROCEDURE — 2500000004 HC RX 250 GENERAL PHARMACY W/ HCPCS (ALT 636 FOR OP/ED): Performed by: STUDENT IN AN ORGANIZED HEALTH CARE EDUCATION/TRAINING PROGRAM

## 2025-02-18 PROCEDURE — 96375 TX/PRO/DX INJ NEW DRUG ADDON: CPT

## 2025-02-18 PROCEDURE — A9575 INJ GADOTERATE MEGLUMI 0.1ML: HCPCS | Performed by: INTERNAL MEDICINE

## 2025-02-18 PROCEDURE — 74183 MRI ABD W/O CNTR FLWD CNTR: CPT

## 2025-02-18 PROCEDURE — 2500000002 HC RX 250 W HCPCS SELF ADMINISTERED DRUGS (ALT 637 FOR MEDICARE OP, ALT 636 FOR OP/ED): Mod: MUE | Performed by: STUDENT IN AN ORGANIZED HEALTH CARE EDUCATION/TRAINING PROGRAM

## 2025-02-18 RX ORDER — DEXTROSE 50 % IN WATER (D50W) INTRAVENOUS SYRINGE
12.5
Status: DISCONTINUED | OUTPATIENT
Start: 2025-02-18 | End: 2025-02-18 | Stop reason: HOSPADM

## 2025-02-18 RX ORDER — GABAPENTIN 300 MG/1
600 CAPSULE ORAL NIGHTLY
Status: DISCONTINUED | OUTPATIENT
Start: 2025-02-18 | End: 2025-02-18 | Stop reason: HOSPADM

## 2025-02-18 RX ORDER — CEPHALEXIN 500 MG/1
500 CAPSULE ORAL 2 TIMES DAILY
Qty: 14 CAPSULE | Refills: 0 | Status: CANCELLED | OUTPATIENT
Start: 2025-02-18

## 2025-02-18 RX ORDER — DEXTROSE 50 % IN WATER (D50W) INTRAVENOUS SYRINGE
25
Status: DISCONTINUED | OUTPATIENT
Start: 2025-02-18 | End: 2025-02-18 | Stop reason: HOSPADM

## 2025-02-18 RX ORDER — OXYCODONE HYDROCHLORIDE 5 MG/1
5 TABLET ORAL EVERY 6 HOURS PRN
Status: DISCONTINUED | OUTPATIENT
Start: 2025-02-18 | End: 2025-02-18 | Stop reason: HOSPADM

## 2025-02-18 RX ORDER — HYDROCHLOROTHIAZIDE 25 MG/1
25 TABLET ORAL DAILY
Status: DISCONTINUED | OUTPATIENT
Start: 2025-02-18 | End: 2025-02-18 | Stop reason: HOSPADM

## 2025-02-18 RX ORDER — ATORVASTATIN CALCIUM 10 MG/1
10 TABLET, FILM COATED ORAL
Status: DISCONTINUED | OUTPATIENT
Start: 2025-02-18 | End: 2025-02-18 | Stop reason: HOSPADM

## 2025-02-18 RX ORDER — CALCIUM CARBONATE 500(1250)
1250 TABLET ORAL
Status: DISCONTINUED | OUTPATIENT
Start: 2025-02-18 | End: 2025-02-18 | Stop reason: HOSPADM

## 2025-02-18 RX ORDER — GABAPENTIN 100 MG/1
100 CAPSULE ORAL 2 TIMES DAILY
Status: DISCONTINUED | OUTPATIENT
Start: 2025-02-18 | End: 2025-02-18 | Stop reason: HOSPADM

## 2025-02-18 RX ORDER — METHOCARBAMOL 500 MG/1
500 TABLET, FILM COATED ORAL ONCE
Status: COMPLETED | OUTPATIENT
Start: 2025-02-18 | End: 2025-02-18

## 2025-02-18 RX ORDER — METFORMIN HYDROCHLORIDE 500 MG/1
1000 TABLET, EXTENDED RELEASE ORAL 2 TIMES DAILY
Status: DISCONTINUED | OUTPATIENT
Start: 2025-02-18 | End: 2025-02-18 | Stop reason: HOSPADM

## 2025-02-18 RX ORDER — VALSARTAN 160 MG/1
320 TABLET ORAL DAILY
Status: DISCONTINUED | OUTPATIENT
Start: 2025-02-18 | End: 2025-02-18 | Stop reason: HOSPADM

## 2025-02-18 RX ORDER — ASPIRIN 81 MG/1
81 TABLET ORAL EVERY OTHER DAY
Status: DISCONTINUED | OUTPATIENT
Start: 2025-02-18 | End: 2025-02-18 | Stop reason: HOSPADM

## 2025-02-18 RX ORDER — METHOCARBAMOL 500 MG/1
500 TABLET, FILM COATED ORAL 4 TIMES DAILY PRN
Qty: 16 TABLET | Refills: 0 | Status: SHIPPED | OUTPATIENT
Start: 2025-02-18

## 2025-02-18 RX ORDER — HYDROXYZINE HYDROCHLORIDE 10 MG/1
10 TABLET, FILM COATED ORAL EVERY 4 HOURS PRN
Status: DISCONTINUED | OUTPATIENT
Start: 2025-02-18 | End: 2025-02-18 | Stop reason: HOSPADM

## 2025-02-18 RX ORDER — LORAZEPAM 2 MG/ML
0.5 INJECTION INTRAMUSCULAR ONCE AS NEEDED
Status: COMPLETED | OUTPATIENT
Start: 2025-02-18 | End: 2025-02-18

## 2025-02-18 RX ORDER — GADOTERATE MEGLUMINE 376.9 MG/ML
18 INJECTION INTRAVENOUS
Status: COMPLETED | OUTPATIENT
Start: 2025-02-18 | End: 2025-02-18

## 2025-02-18 RX ORDER — AMLODIPINE BESYLATE 5 MG/1
5 TABLET ORAL DAILY
Status: DISCONTINUED | OUTPATIENT
Start: 2025-02-18 | End: 2025-02-18 | Stop reason: HOSPADM

## 2025-02-18 RX ORDER — IBUPROFEN 200 MG
1 CAPSULE ORAL DAILY
COMMUNITY

## 2025-02-18 RX ORDER — PHENAZOPYRIDINE HYDROCHLORIDE 100 MG/1
100 TABLET, FILM COATED ORAL 3 TIMES DAILY PRN
Qty: 30 TABLET | Refills: 0 | Status: SHIPPED | OUTPATIENT
Start: 2025-02-18

## 2025-02-18 RX ORDER — PANTOPRAZOLE SODIUM 20 MG/1
20 TABLET, DELAYED RELEASE ORAL
Status: DISCONTINUED | OUTPATIENT
Start: 2025-02-18 | End: 2025-02-18 | Stop reason: HOSPADM

## 2025-02-18 RX ORDER — GLIPIZIDE 2.5 MG/1
2.5 TABLET, EXTENDED RELEASE ORAL
Status: DISCONTINUED | OUTPATIENT
Start: 2025-02-18 | End: 2025-02-18 | Stop reason: HOSPADM

## 2025-02-18 RX ORDER — SERTRALINE HYDROCHLORIDE 50 MG/1
25 TABLET, FILM COATED ORAL DAILY
Status: DISCONTINUED | OUTPATIENT
Start: 2025-02-18 | End: 2025-02-18 | Stop reason: HOSPADM

## 2025-02-18 RX ORDER — LANOLIN ALCOHOL/MO/W.PET/CERES
1000 CREAM (GRAM) TOPICAL DAILY
COMMUNITY

## 2025-02-18 RX ORDER — POTASSIUM CHLORIDE 20 MEQ/1
40 TABLET, EXTENDED RELEASE ORAL ONCE
Status: COMPLETED | OUTPATIENT
Start: 2025-02-18 | End: 2025-02-18

## 2025-02-18 RX ORDER — PHENAZOPYRIDINE HYDROCHLORIDE 100 MG/1
95 TABLET, FILM COATED ORAL ONCE
Status: COMPLETED | OUTPATIENT
Start: 2025-02-18 | End: 2025-02-18

## 2025-02-18 RX ORDER — LORAZEPAM 1 MG/1
1 TABLET ORAL DAILY PRN
Status: DISCONTINUED | OUTPATIENT
Start: 2025-02-18 | End: 2025-02-18

## 2025-02-18 RX ADMIN — LORAZEPAM 0.5 MG: 2 INJECTION INTRAMUSCULAR; INTRAVENOUS at 15:02

## 2025-02-18 RX ADMIN — GADOTERATE MEGLUMINE 18 ML: 376.9 INJECTION INTRAVENOUS at 15:40

## 2025-02-18 RX ADMIN — CALCIUM 1250 MG: 500 TABLET ORAL at 16:15

## 2025-02-18 RX ADMIN — GABAPENTIN 100 MG: 100 CAPSULE ORAL at 09:03

## 2025-02-18 RX ADMIN — GLIPIZIDE 2.5 MG: 2.5 TABLET, EXTENDED RELEASE ORAL at 08:59

## 2025-02-18 RX ADMIN — ONDANSETRON 4 MG: 2 INJECTION, SOLUTION INTRAMUSCULAR; INTRAVENOUS at 05:46

## 2025-02-18 RX ADMIN — ASPIRIN 81 MG: 81 TABLET, COATED ORAL at 08:58

## 2025-02-18 RX ADMIN — HYDROMORPHONE HYDROCHLORIDE 0.5 MG: 1 INJECTION, SOLUTION INTRAMUSCULAR; INTRAVENOUS; SUBCUTANEOUS at 03:27

## 2025-02-18 RX ADMIN — AMLODIPINE BESYLATE 5 MG: 5 TABLET ORAL at 08:58

## 2025-02-18 RX ADMIN — VALSARTAN 320 MG: 160 TABLET, FILM COATED ORAL at 09:03

## 2025-02-18 RX ADMIN — ACETAMINOPHEN 650 MG: 325 TABLET, FILM COATED ORAL at 13:39

## 2025-02-18 RX ADMIN — SERTRALINE 25 MG: 50 TABLET, FILM COATED ORAL at 08:59

## 2025-02-18 RX ADMIN — POTASSIUM CHLORIDE 40 MEQ: 1500 TABLET, EXTENDED RELEASE ORAL at 16:15

## 2025-02-18 RX ADMIN — METHOCARBAMOL 500 MG: 500 TABLET ORAL at 11:11

## 2025-02-18 RX ADMIN — HYDROMORPHONE HYDROCHLORIDE 0.5 MG: 1 INJECTION, SOLUTION INTRAMUSCULAR; INTRAVENOUS; SUBCUTANEOUS at 05:46

## 2025-02-18 RX ADMIN — HYDROCHLOROTHIAZIDE 25 MG: 25 TABLET ORAL at 09:03

## 2025-02-18 RX ADMIN — CALCIUM 1250 MG: 500 TABLET ORAL at 08:59

## 2025-02-18 RX ADMIN — PHENAZOPYRIDINE 100 MG: 100 TABLET ORAL at 11:11

## 2025-02-18 SDOH — SOCIAL STABILITY: SOCIAL INSECURITY: WITHIN THE LAST YEAR, HAVE YOU BEEN HUMILIATED OR EMOTIONALLY ABUSED IN OTHER WAYS BY YOUR PARTNER OR EX-PARTNER?: NO

## 2025-02-18 SDOH — SOCIAL STABILITY: SOCIAL INSECURITY
WITHIN THE LAST YEAR, HAVE YOU BEEN RAPED OR FORCED TO HAVE ANY KIND OF SEXUAL ACTIVITY BY YOUR PARTNER OR EX-PARTNER?: NO

## 2025-02-18 SDOH — ECONOMIC STABILITY: INCOME INSECURITY: IN THE PAST 12 MONTHS HAS THE ELECTRIC, GAS, OIL, OR WATER COMPANY THREATENED TO SHUT OFF SERVICES IN YOUR HOME?: NO

## 2025-02-18 SDOH — HEALTH STABILITY: MENTAL HEALTH
DO YOU FEEL STRESS - TENSE, RESTLESS, NERVOUS, OR ANXIOUS, OR UNABLE TO SLEEP AT NIGHT BECAUSE YOUR MIND IS TROUBLED ALL THE TIME - THESE DAYS?: NOT AT ALL

## 2025-02-18 SDOH — HEALTH STABILITY: PHYSICAL HEALTH
HOW OFTEN DO YOU NEED TO HAVE SOMEONE HELP YOU WHEN YOU READ INSTRUCTIONS, PAMPHLETS, OR OTHER WRITTEN MATERIAL FROM YOUR DOCTOR OR PHARMACY?: NEVER

## 2025-02-18 SDOH — ECONOMIC STABILITY: HOUSING INSECURITY: IN THE LAST 12 MONTHS, WAS THERE A TIME WHEN YOU WERE NOT ABLE TO PAY THE MORTGAGE OR RENT ON TIME?: NO

## 2025-02-18 SDOH — ECONOMIC STABILITY: HOUSING INSECURITY: AT ANY TIME IN THE PAST 12 MONTHS, WERE YOU HOMELESS OR LIVING IN A SHELTER (INCLUDING NOW)?: NO

## 2025-02-18 SDOH — SOCIAL STABILITY: SOCIAL NETWORK
DO YOU BELONG TO ANY CLUBS OR ORGANIZATIONS SUCH AS CHURCH GROUPS, UNIONS, FRATERNAL OR ATHLETIC GROUPS, OR SCHOOL GROUPS?: NO

## 2025-02-18 SDOH — SOCIAL STABILITY: SOCIAL INSECURITY: ARE YOU MARRIED, WIDOWED, DIVORCED, SEPARATED, NEVER MARRIED, OR LIVING WITH A PARTNER?: MARRIED

## 2025-02-18 SDOH — SOCIAL STABILITY: SOCIAL INSECURITY
WITHIN THE LAST YEAR, HAVE YOU BEEN KICKED, HIT, SLAPPED, OR OTHERWISE PHYSICALLY HURT BY YOUR PARTNER OR EX-PARTNER?: NO

## 2025-02-18 SDOH — HEALTH STABILITY: MENTAL HEALTH: HOW OFTEN DO YOU HAVE A DRINK CONTAINING ALCOHOL?: NEVER

## 2025-02-18 SDOH — ECONOMIC STABILITY: FOOD INSECURITY: WITHIN THE PAST 12 MONTHS, THE FOOD YOU BOUGHT JUST DIDN'T LAST AND YOU DIDN'T HAVE MONEY TO GET MORE.: NEVER TRUE

## 2025-02-18 SDOH — HEALTH STABILITY: MENTAL HEALTH: HOW MANY DRINKS CONTAINING ALCOHOL DO YOU HAVE ON A TYPICAL DAY WHEN YOU ARE DRINKING?: PATIENT DOES NOT DRINK

## 2025-02-18 SDOH — SOCIAL STABILITY: SOCIAL NETWORK: HOW OFTEN DO YOU ATTEND CHURCH OR RELIGIOUS SERVICES?: NEVER

## 2025-02-18 SDOH — ECONOMIC STABILITY: FOOD INSECURITY: WITHIN THE PAST 12 MONTHS, YOU WORRIED THAT YOUR FOOD WOULD RUN OUT BEFORE YOU GOT THE MONEY TO BUY MORE.: NEVER TRUE

## 2025-02-18 SDOH — SOCIAL STABILITY: SOCIAL INSECURITY: WITHIN THE LAST YEAR, HAVE YOU BEEN AFRAID OF YOUR PARTNER OR EX-PARTNER?: NO

## 2025-02-18 SDOH — HEALTH STABILITY: MENTAL HEALTH: HOW OFTEN DO YOU HAVE SIX OR MORE DRINKS ON ONE OCCASION?: NEVER

## 2025-02-18 SDOH — ECONOMIC STABILITY: FOOD INSECURITY: HOW HARD IS IT FOR YOU TO PAY FOR THE VERY BASICS LIKE FOOD, HOUSING, MEDICAL CARE, AND HEATING?: NOT HARD AT ALL

## 2025-02-18 SDOH — ECONOMIC STABILITY: TRANSPORTATION INSECURITY: IN THE PAST 12 MONTHS, HAS LACK OF TRANSPORTATION KEPT YOU FROM MEDICAL APPOINTMENTS OR FROM GETTING MEDICATIONS?: NO

## 2025-02-18 SDOH — SOCIAL STABILITY: SOCIAL NETWORK
IN A TYPICAL WEEK, HOW MANY TIMES DO YOU TALK ON THE PHONE WITH FAMILY, FRIENDS, OR NEIGHBORS?: MORE THAN THREE TIMES A WEEK

## 2025-02-18 SDOH — HEALTH STABILITY: PHYSICAL HEALTH: ON AVERAGE, HOW MANY MINUTES DO YOU ENGAGE IN EXERCISE AT THIS LEVEL?: 0 MIN

## 2025-02-18 SDOH — SOCIAL STABILITY: SOCIAL INSECURITY: HAVE YOU HAD THOUGHTS OF HARMING ANYONE ELSE?: NO

## 2025-02-18 SDOH — SOCIAL STABILITY: SOCIAL INSECURITY: WERE YOU ABLE TO COMPLETE ALL THE BEHAVIORAL HEALTH SCREENINGS?: YES

## 2025-02-18 SDOH — SOCIAL STABILITY: SOCIAL NETWORK: HOW OFTEN DO YOU ATTEND MEETINGS OF THE CLUBS OR ORGANIZATIONS YOU BELONG TO?: NEVER

## 2025-02-18 SDOH — HEALTH STABILITY: PHYSICAL HEALTH: ON AVERAGE, HOW MANY DAYS PER WEEK DO YOU ENGAGE IN MODERATE TO STRENUOUS EXERCISE (LIKE A BRISK WALK)?: 0 DAYS

## 2025-02-18 SDOH — SOCIAL STABILITY: SOCIAL NETWORK: HOW OFTEN DO YOU GET TOGETHER WITH FRIENDS OR RELATIVES?: MORE THAN THREE TIMES A WEEK

## 2025-02-18 SDOH — ECONOMIC STABILITY: HOUSING INSECURITY: IN THE PAST 12 MONTHS, HOW MANY TIMES HAVE YOU MOVED WHERE YOU WERE LIVING?: 0

## 2025-02-18 ASSESSMENT — COGNITIVE AND FUNCTIONAL STATUS - GENERAL
WALKING IN HOSPITAL ROOM: A LITTLE
MOBILITY SCORE: 22
HELP NEEDED FOR BATHING: A LITTLE
CLIMB 3 TO 5 STEPS WITH RAILING: A LITTLE
TOILETING: A LITTLE
PATIENT BASELINE BEDBOUND: NO
DAILY ACTIVITIY SCORE: 21
DRESSING REGULAR LOWER BODY CLOTHING: A LITTLE

## 2025-02-18 ASSESSMENT — PAIN SCALES - GENERAL
PAINLEVEL_OUTOF10: 6
PAINLEVEL_OUTOF10: 0 - NO PAIN
PAINLEVEL_OUTOF10: 8
PAINLEVEL_OUTOF10: 5 - MODERATE PAIN
PAINLEVEL_OUTOF10: 5 - MODERATE PAIN
PAINLEVEL_OUTOF10: 3

## 2025-02-18 ASSESSMENT — ACTIVITIES OF DAILY LIVING (ADL)
LACK_OF_TRANSPORTATION: NO
WALKS IN HOME: INDEPENDENT
PATIENT'S MEMORY ADEQUATE TO SAFELY COMPLETE DAILY ACTIVITIES?: YES
FEEDING YOURSELF: INDEPENDENT
HEARING - RIGHT EAR: FUNCTIONAL
TOILETING: INDEPENDENT
DRESSING YOURSELF: INDEPENDENT
ADEQUATE_TO_COMPLETE_ADL: YES
HEARING - LEFT EAR: FUNCTIONAL
LACK_OF_TRANSPORTATION: NO
GROOMING: INDEPENDENT
JUDGMENT_ADEQUATE_SAFELY_COMPLETE_DAILY_ACTIVITIES: YES
BATHING: INDEPENDENT

## 2025-02-18 ASSESSMENT — PAIN - FUNCTIONAL ASSESSMENT
PAIN_FUNCTIONAL_ASSESSMENT: 0-10

## 2025-02-18 ASSESSMENT — LIFESTYLE VARIABLES
AUDIT-C TOTAL SCORE: 0
SKIP TO QUESTIONS 9-10: 1
HOW MANY STANDARD DRINKS CONTAINING ALCOHOL DO YOU HAVE ON A TYPICAL DAY: PATIENT DOES NOT DRINK
AUDIT-C TOTAL SCORE: 0
HOW OFTEN DO YOU HAVE A DRINK CONTAINING ALCOHOL: NEVER
AUDIT-C TOTAL SCORE: 0
HOW OFTEN DO YOU HAVE 6 OR MORE DRINKS ON ONE OCCASION: NEVER
SKIP TO QUESTIONS 9-10: 1

## 2025-02-18 ASSESSMENT — PAIN DESCRIPTION - DESCRIPTORS
DESCRIPTORS: HEADACHE
DESCRIPTORS: SHARP
DESCRIPTORS: SHARP

## 2025-02-18 ASSESSMENT — PATIENT HEALTH QUESTIONNAIRE - PHQ9
SUM OF ALL RESPONSES TO PHQ9 QUESTIONS 1 & 2: 0
2. FEELING DOWN, DEPRESSED OR HOPELESS: NOT AT ALL
1. LITTLE INTEREST OR PLEASURE IN DOING THINGS: NOT AT ALL

## 2025-02-18 ASSESSMENT — PAIN DESCRIPTION - LOCATION
LOCATION: ABDOMEN
LOCATION: HEAD

## 2025-02-18 ASSESSMENT — PAIN DESCRIPTION - ORIENTATION: ORIENTATION: RIGHT

## 2025-02-18 ASSESSMENT — PAIN SCALES - PAIN ASSESSMENT IN ADVANCED DEMENTIA (PAINAD): TOTALSCORE: MEDICATION (SEE MAR)

## 2025-02-18 NOTE — DISCHARGE SUMMARY
Discharge Diagnosis  Intractable pain    Issues Requiring Follow-Up  PCP    Discharge Meds     Medication List      START taking these medications     methocarbamol 500 mg tablet; Commonly known as: Robaxin; Take 1 tablet   (500 mg) by mouth 4 times a day as needed for muscle spasms.   phenazopyridine 100 mg tablet; Commonly known as: Pyridium; Take 1   tablet (100 mg) by mouth 3 times a day as needed for bladder spasms.     CONTINUE taking these medications     Accu-Chek Guide test strips strip; Generic drug: blood sugar diagnostic   acetaminophen 500 mg tablet; Commonly known as: Tylenol   amLODIPine 5 mg tablet; Commonly known as: Norvasc; Take 1 tablet (5 mg)   by mouth once daily. As directed   aspirin 81 mg EC tablet   atorvastatin 10 mg tablet; Commonly known as: Lipitor; Take 1 tablet (10   mg) by mouth once every day.   calcium citrate 200 mg (950 mg) tablet; Commonly known as: Calcitrate   cyanocobalamin 1,000 mcg tablet; Commonly known as: Vitamin B-12   * FreeStyle Shyla 2 Sensor kit; Generic drug: flash glucose sensor kit;   use to test blood sugar daily   * FreeStyle Shyla 2 Sensor kit; Generic drug: flash glucose sensor kit;   Use as instructed   * gabapentin 100 mg capsule; Commonly known as: Neurontin; Take 5 pills   daily as follows: 100mg AM; 100mg evening; 300mg QHS   * gabapentin 300 mg capsule; Commonly known as: Neurontin; Take 2   capsules (600 mg) by mouth once daily at bedtime. Take at bedtime.   glipiZIDE XL 2.5 mg 24 hr tablet; Commonly known as: Glucotrol XL; Take   1 tablet (2.5 mg) by mouth once daily with breakfast. Do not crush, chew,   or split.   ibuprofen 600 mg tablet   LORazepam 1 mg tablet; Commonly known as: Ativan; Take 1 tablet (1 mg)   by mouth once daily as needed for anxiety (as needed for MRI).   metFORMIN  mg 24 hr tablet; Commonly known as: Glucophage-XR; Take   2 tablets (1,000 mg) by mouth 2 times a day.   olmesartan-hydrochlorothiazide 40-25 mg tablet;  Goal Outcome Evaluation:  Plan of Care Reviewed With: patient        Progress: no change  Outcome Evaluation: VSS on 1L NC. A&O x4. PRN ultram given for c/o back pain. Dark brown/blackish liquid/soft/seedy BM noted this evening. No c/o abd pain or tenderness, bowel sounds positive. Tolerating clear liquid diet. Pt resting well in between care.                              "Commonly known as:   BENIcar HCT; Take 1 tablet by mouth once daily.   omeprazole 20 mg DR capsule; Commonly known as: PriLOSEC; Take 1 capsule   (20 mg) by mouth 2 times a day.   sertraline 25 mg tablet; Commonly known as: Zoloft; Take 1 tablet (25   mg) by mouth once every day.   triamcinolone 0.1 % ointment; Commonly known as: Kenalog; Apply   topically 2 times a day as needed for irritation or rash.  * This list has 4 medication(s) that are the same as other medications   prescribed for you. Read the directions carefully, and ask your doctor or   other care provider to review them with you.       Test Results Pending At Discharge  Pending Labs       Order Current Status    Urine Culture In process            Hospital Course  Dacia Babcock \"Allyssa" is a 69 y.o. female with history of endometrial carcinoma status post ROXANA/BSO and IPMN presenting with pelvic cramping and right flank pain for 3 days. Patient thought that she may have a UTI so went to urgent care and was told she did not have a UTI and was encouraged to go to the ED for further evaluation. Here in the ED, patient did have positive leukocyte esterase and white blood cells in her urine. She was started on ceftriaxone and was given Dilaudid x 2 for the pain. CT of the abdomen/pelvis showed increased size of pancreatic tail cyst and new lesion in the pancreatic body with recommendation for MRI follow-up. Patient most recently had an MRCP pancreas protocol in November, which was stable from previous imaging.       She was admitted to observation for further work up and monitoring. Her UA only showed leuks in her urine, but otherwise was not overtly conving for UTI just based off her UA. However she endorses suprapubic, like a bladder spasms that radiates to the right and into her back. She was trialed on pyridium and robaxin. . She also underwent an MRI of the pancreas which showed no significant changes. Dr. De La Rosa made aware.    Labs/Testing " reviewed    Interdisciplinary team rounding completed with hospitalist, nurse, TCC    NP discussed plan and lab/testing results with Dr. Bean         Pertinent Physical Exam At Time of Discharge  Physical Exam    Outpatient Follow-Up  Future Appointments   Date Time Provider Department [facility]   3/12/2025  8:00 AM Gerry Herzog DO [id number] [facility]   3/19/2025 10:20 AM Armida Youngblood MD [id number] [facility]   12/11/2025 10:00 AM Dhara Newberry, APRN-CNP [id number] [facility]         Sandra Hernandez, APRN-CNP

## 2025-02-18 NOTE — ASSESSMENT & PLAN NOTE
"Dacia Babcock \"Allyssa" is a 69 y.o. female with history of endometrial carcinoma status post ROXANA/BSO and IPMN presenting with pelvic cramping and right flank pain for 3 days.  Patient thought that she may have a UTI so went to urgent care and was told she did not have a UTI and was encouraged to go to the ED for further evaluation.  Here in the ED, patient did have positive leukocyte esterase and white blood cells in her urine.  She was started on ceftriaxone and was given Dilaudid x 2 for the pain.  CT of the abdomen/pelvis showed increased size of pancreatic tail cyst and new lesion in the pancreatic body with recommendation for MRI follow-up.  Patient most recently had an MRCP pancreas protocol in November, which was stable from previous imaging.     Pelvic pain/ Right flank pain  - UA with some leuks, not overtly convincing but and improvement sicne starting abx will continue rocephin   -await culture   -no leukocytosis, a febrile   "

## 2025-02-18 NOTE — HOSPITAL COURSE
"Dacia Babcock \"Allyssa" is a 69 y.o. female with history of endometrial carcinoma status post ROXANA/BSO and IPMN presenting with pelvic cramping and right flank pain for 3 days. Patient thought that she may have a UTI so went to urgent care and was told she did not have a UTI and was encouraged to go to the ED for further evaluation. Here in the ED, patient did have positive leukocyte esterase and white blood cells in her urine. She was started on ceftriaxone and was given Dilaudid x 2 for the pain. CT of the abdomen/pelvis showed increased size of pancreatic tail cyst and new lesion in the pancreatic body with recommendation for MRI follow-up. Patient most recently had an MRCP pancreas protocol in November, which was stable from previous imaging.       She was admitted to observation for further work up and monitoring. Her UA only showed leuks in her urine, but otherwise was not overtly conving for UTI just based off her UA. However she endorses suprapubic, like a bladder spasms that radiates to the right and into her back. She was trialed on pyridium and robaxin. . She also underwent an MRI of the pancreas which showed no significant changes. Dr. De La Rosa made aware.    Labs/Testing reviewed    Interdisciplinary team rounding completed with hospitalist, nurse, TCC    NP discussed plan and lab/testing results with Dr. Bean       "

## 2025-02-18 NOTE — NURSING NOTE

## 2025-02-18 NOTE — PROGRESS NOTES
Pharmacy Medication History     Source of Information: Patient    Additional concerns with the patient's PTA list.   Patient requested that if she needs to stay more than today, she'd like to have her  bring the triamcinolone ointment as she already has too much at home - she said she needs to use it quite often.  Per patient, her diabetes doctor told her to take aspirin once daily as stroke prevention.  The following updates were made to the Prior to Admission medication list:     Medications ADDED:   B12, calcium citrate  Medications CHANGED:  N/A  Medications REMOVED:   Hydroxyzine, calcium carbonate, esomeprazole  Medications NOT TAKING:   Hydroxyzine    Meds 2 Beds : No    Outpatient pharmacy confirmed and updated in chart : Yes    Pharmacy name: [facility] ([place]    The list below reflectives the updated PTA list. Please review each medication in order reconciliation for additional clarification and justification.    Prior to Admission Medications   Prescriptions   LORazepam (Ativan) 1 mg tablet   Sig: Take 1 tablet (1 mg) by mouth once daily as needed for anxiety (as needed for MRI).   acetaminophen (Tylenol) 500 mg tablet   Sig: Take 1 tablet (500 mg) by mouth every 4 hours if needed for moderate pain (4 - 6).   amLODIPine (Norvasc) 5 mg tablet   Sig: Take 1 tablet (5 mg) by mouth once daily. As directed   aspirin 81 mg EC tablet   Sig: Take 1 tablet (81 mg) by mouth every day.   atorvastatin (Lipitor) 10 mg tablet   Sig: Take 1 tablet (10 mg) by mouth once every day.   calcium citrate (Calcitrate) 200 mg (950 mg) tablet   Sig: Take 1 tablet (200 mg) by mouth once daily.   cyanocobalamin (Vitamin B-12) 1,000 mcg tablet   Sig: Take 1 tablet (1,000 mcg) by mouth once daily.   gabapentin (Neurontin) 100 mg capsule   Sig: Take 5 pills daily as follows: 100mg AM; 100mg evening; 300mg QHS   Patient taking differently: Take 1 capsule (100 mg) by mouth 2 times daily (morning and midday).   gabapentin  (Neurontin) 300 mg capsule   Sig: Take 2 capsules (600 mg) by mouth once daily at bedtime. Take at bedtime.   glipiZIDE XL (Glucotrol XL) 2.5 mg 24 hr tablet   Sig: Take 1 tablet (2.5 mg) by mouth once daily with breakfast. Do not crush, chew, or split.   ibuprofen 600 mg tablet   Sig: Take 1 tablet (600 mg) by mouth every 6 hours PRN   metFORMIN  mg 24 hr tablet   Sig: Take 2 tablets (1,000 mg) by mouth 2 times a day.   olmesartan-hydrochlorothiazide (BENIcar HCT) 40-25 mg tablet   Sig: Take 1 tablet by mouth once daily.   omeprazole (PriLOSEC) 20 mg DR capsule   Sig: Take 1 capsule (20 mg) by mouth 2 times a day.   sertraline (Zoloft) 25 mg tablet   Sig: Take 1 tablet (25 mg) by mouth once every day.   triamcinolone (Kenalog) 0.1 % ointment   Sig: Apply topically 2 times a day as needed for irritation or rash.      Facility-Administered Medications: None       02/18/25 at 9:41 AM - RAULITO SEVILLA

## 2025-02-18 NOTE — ED PROVIDER NOTES
History/Exam limitations: none.   Additional history was obtained from patient, relative(s), and past medical records.          HPI:    Dacia Babcock is a 69 y.o. female PMH mucinous endometrial cancer status post TLH/BSO, hyperlipidemia, asthma, hypertension, diabetes, peripheral neuropathy presenting for evaluation of lower abdominal pain.  Patient reports that she began having pain in her lower abdomen with radiation to her right posterior back area.  Primarily in the suprapubic area.  States that she can feel spasms when she urinates.  Pains been gradually worsening.  Is having normal bowel movements.  Has had some nausea.  No headache, vision changes, fever, chills, chest pain, shortness of breath.          Physical Exam:  ED Triage Vitals [02/17/25 1430]   Temperature Heart Rate Respirations BP   36.3 °C (97.3 °F) 80 18 124/64      Pulse Ox Temp Source Heart Rate Source Patient Position   97 % Temporal Monitor Sitting      BP Location FiO2 (%)     Left arm --        GEN:      Alert, NAD  Eyes:       PERRL, EOMI  HENT:      NC/AT, OP clear, airway patent, MM  CV:      RRR, no MRG, no LE pitting edema, 2+ radial and pedal pulses  PULM:     CTAB, no w/r/r, easy WOB, symmetric chest rise  ABD:      Soft, suprapubic tenderness, right lower quadrant tenderness, no rebound or guarding, no palpable hernia, ND, no masses, BS +  :       No CVA TTP  NEURO:   A&Ox3, no focal deficits    MSK:      FROM, no joint deformities or swelling, no e/o trauma  SKIN:       Warm and dry  PSYCH:    Appropriate mood and affect         MDM/ED Course:   Dacia Babcock is a 69 y.o. female PMH mucinous endometrial cancer status post TLH/BSO, hyperlipidemia, asthma, hypertension, diabetes, peripheral neuropathy presenting for evaluation of lower abdominal pain.  Vitals reassuring.  Exam as documented above.  Differential for abdominal pain includes but is not limited to SBO, incarcerated hernia, pancreatitis, viscous organ  rupture, mesenteric ischemia, aortic pathology, biliary pathology, diverticulitis, appendicitis, UTI/pyelonephritis, nephrolithiasis, viral illness.  Differential includes recurrence of cancer.  Patient's pain is intermittently fluctuating in intensity cramping and worsened by movement intermittently.  IV placed and labs drawn.  Patient is given IV Dilaudid, IV Zofran.  Labs obtained CBC with no leukocytosis or significant anemia.  Chemistry reassuring.  Lipase negative unlikely pancreatitis given location of pain.  Lactate normal.  Flu COVID-negative.  CT abdomen pelvis obtained and increase in size of pancreatic tail cyst and a new somewhat ill-defined hypodense lesion in the pancreatic body which could relate to an additional cyst or solid malignancy.  Recommendation made for MRI within the next week.  Patient had continued pain and additional dose of IV Dilaudid was given.  Patient's urinalysis was obtained and possible UTI with leuk esterase white cells present.  Patient does have urinary symptoms and symptoms concern for possible bladder spasm.  Patient does have some discomfort in her right low back area, no aneurysm on imaging, do not believe her symptoms are consistent with aortic dissection at this time, patient is overall well-appearing, equal pulses in lower extremities, no tearing type sensation in mid abdomen through back.  Discussed findings with patient at length.  Patient ultimately does require hospitalization for pain control.  IV ceftriaxone given for possible UTI.  Patient care signed out to admitting provider for continued management stable condition.     ED Course as of 02/18/25 1020   Mon Feb 17, 2025 1928 Patient having emergency spasming type pain in her suprapubic area and right low back.  No tearing type sensation through back.  Has good peripheral pulses.  Not currently present only happens with movement.  Unclear etiology, urine pending.  CT with concerning pancreatic lesion.   Additional pain meds being given. [JM]      ED Course User Index  [JM] Jose Acuña MD         Diagnoses as of 02/18/25 1020   Abdominal pain, unspecified abdominal location   Pancreatic lesion ([facility])         Chronic medical conditions affecting care listed in MDM. I independently reviewed imaging studies and agreed with radiology reads. I reviewed recent and relevant outside records including PCP notes, Prior discharge summaries, and prior radiology reports.    Procedure  Procedures    Diagnosis:   1.  Abdominal pain  2.  Pancreatic lesion  3.  Possible UTI    Dispo: Hospitalized in stable condition      Disclaimer: Portions of this note were dictated by speech recognition. An attempt at proof reading was made to minimize errors. Minor errors in transcription may be present.  Please call if questions.     Jose Acuña MD  02/18/25 1020

## 2025-02-18 NOTE — PROGRESS NOTES
"Dacia Babcock \"Allyssa" is a 69 y.o. female on day 0 of admission presenting with Intractable pain.    Subjective   Reports most of her pain is suprapubic that radiates to the right and into her back. Also some RQ tenderness noted on exam. Reports it feels like a bladder spasm. Does endorse improvement compared to when she came in        Objective     Physical Exam  Constitutional:       Appearance: Normal appearance.   Cardiovascular:      Rate and Rhythm: Normal rate and regular rhythm.      Pulses: Normal pulses.      Heart sounds: Normal heart sounds. No murmur heard.     No gallop.   Pulmonary:      Effort: Pulmonary effort is normal. No respiratory distress.      Breath sounds: Normal breath sounds. No wheezing or rhonchi.   Abdominal:      General: Abdomen is flat. Bowel sounds are normal. There is no distension.      Palpations: Abdomen is soft.      Tenderness: There is abdominal tenderness. There is no guarding.      Comments: To RLQ and suprapubic area   Musculoskeletal:         General: Normal range of motion.   Skin:     General: Skin is warm.      Capillary Refill: Capillary refill takes less than 2 seconds.   Neurological:      Mental Status: She is alert and oriented to person, place, and time.   Psychiatric:         Mood and Affect: Mood normal.         Thought Content: Thought content normal.         Judgment: Judgment normal.         Last Recorded Vitals  Blood pressure 121/70, pulse 65, temperature 36.3 °C (97.3 °F), temperature source Temporal, resp. rate 20, height 1.626 m (5' 4\"), weight 87.1 kg (192 lb), SpO2 95%.  Intake/Output last 3 Shifts:  No intake/output data recorded.    Relevant Results  Scheduled medications  amLODIPine, 5 mg, oral, Daily  aspirin, 81 mg, oral, Every other day  atorvastatin, 10 mg, oral, Every Day  calcium carbonate, 1,250 mg, oral, BID  cefTRIAXone, 1 g, intravenous, q24h  enoxaparin, 40 mg, subcutaneous, q24h  gabapentin, 100 mg, oral, BID  gabapentin, 600 mg, " oral, Nightly  glipiZIDE XL, 2.5 mg, oral, Daily with breakfast  valsartan, 320 mg, oral, Daily   And  hydroCHLOROthiazide, 25 mg, oral, Daily  insulin lispro, 0-5 Units, subcutaneous, TID AC  [Held by provider] metFORMIN XR, 1,000 mg, oral, BID  pantoprazole, 20 mg, oral, Daily before breakfast  polyethylene glycol, 17 g, oral, Daily  sertraline, 25 mg, oral, Daily      Continuous medications     PRN medications  PRN medications: acetaminophen, benzocaine-menthol, diphenhydrAMINE, docusate sodium, HYDROmorphone, hydrOXYzine HCL, LORazepam, lubricating eye drops, melatonin, ondansetron, oxyCODONE, oxyCODONE, simethicone, sodium chloride    Results for orders placed or performed during the hospital encounter of 02/17/25 (from the past 24 hours)   CBC and Auto Differential   Result Value Ref Range    WBC 10.4 4.4 - 11.3 x10*3/uL    nRBC 0.0 0.0 - 0.0 /100 WBCs    RBC 4.32 4.00 - 5.20 x10*6/uL    Hemoglobin 11.2 (L) 12.0 - 16.0 g/dL    Hematocrit 36.4 36.0 - 46.0 %    MCV 84 80 - 100 fL    MCH 25.9 (L) 26.0 - 34.0 pg    MCHC 30.8 (L) 32.0 - 36.0 g/dL    RDW 14.6 (H) 11.5 - 14.5 %    Platelets 410 150 - 450 x10*3/uL    Neutrophils % 56.5 40.0 - 80.0 %    Immature Granulocytes %, Automated 0.2 0.0 - 0.9 %    Lymphocytes % 33.4 13.0 - 44.0 %    Monocytes % 6.2 2.0 - 10.0 %    Eosinophils % 2.5 0.0 - 6.0 %    Basophils % 1.2 0.0 - 2.0 %    Neutrophils Absolute 5.85 (H) 1.60 - 5.50 x10*3/uL    Immature Granulocytes Absolute, Automated 0.02 0.00 - 0.50 x10*3/uL    Lymphocytes Absolute 3.46 (H) 0.80 - 3.00 x10*3/uL    Monocytes Absolute 0.64 0.05 - 0.80 x10*3/uL    Eosinophils Absolute 0.26 0.00 - 0.40 x10*3/uL    Basophils Absolute 0.12 (H) 0.00 - 0.10 x10*3/uL   Comprehensive metabolic panel   Result Value Ref Range    Glucose 124 (H) 74 - 99 mg/dL    Sodium 142 136 - 145 mmol/L    Potassium 3.5 3.5 - 5.3 mmol/L    Chloride 107 98 - 107 mmol/L    Bicarbonate 24 21 - 32 mmol/L    Anion Gap 15 10 - 20 mmol/L    Urea Nitrogen  27 (H) 6 - 23 mg/dL    Creatinine 0.91 0.50 - 1.05 mg/dL    eGFR 68 >60 mL/min/1.73m*2    Calcium 9.5 8.6 - 10.3 mg/dL    Albumin 4.0 3.4 - 5.0 g/dL    Alkaline Phosphatase 89 33 - 136 U/L    Total Protein 7.0 6.4 - 8.2 g/dL    AST 11 9 - 39 U/L    Bilirubin, Total 0.3 0.0 - 1.2 mg/dL    ALT 11 7 - 45 U/L   Lipase   Result Value Ref Range    Lipase 16 9 - 82 U/L   Lactate   Result Value Ref Range    Lactate 0.8 0.4 - 2.0 mmol/L   Urinalysis with Reflex Culture and Microscopic   Result Value Ref Range    Color, Urine Light-Yellow Light-Yellow, Yellow, Dark-Yellow    Appearance, Urine Clear Clear    Specific Gravity, Urine 1.027 1.005 - 1.035    pH, Urine 6.5 5.0, 5.5, 6.0, 6.5, 7.0, 7.5, 8.0    Protein, Urine NEGATIVE NEGATIVE, 10 (TRACE), 20 (TRACE) mg/dL    Glucose, Urine 30 (TRACE) (A) Normal mg/dL    Blood, Urine NEGATIVE NEGATIVE mg/dL    Ketones, Urine NEGATIVE NEGATIVE mg/dL    Bilirubin, Urine NEGATIVE NEGATIVE mg/dL    Urobilinogen, Urine Normal Normal mg/dL    Nitrite, Urine NEGATIVE NEGATIVE    Leukocyte Esterase, Urine 250 Richar/uL (A) NEGATIVE   Extra Urine Gray Tube   Result Value Ref Range    Extra Tube Hold for add-ons.    Microscopic Only, Urine   Result Value Ref Range    WBC, Urine 6-10 (A) 1-5, NONE /HPF    RBC, Urine 3-5 NONE, 1-2, 3-5 /HPF    Squamous Epithelial Cells, Urine 1-9 (SPARSE) Reference range not established. /HPF    Mucus, Urine 2+ Reference range not established. /LPF    Hyaline Casts, Urine OCCASIONAL (A) NONE /LPF   Sars-CoV-2 and Influenza A/B PCR   Result Value Ref Range    Flu A Result Not Detected Not Detected    Flu B Result Not Detected Not Detected    Coronavirus 2019, PCR Not Detected Not Detected   Basic Metabolic Panel   Result Value Ref Range    Glucose 128 (H) 74 - 99 mg/dL    Sodium 140 136 - 145 mmol/L    Potassium 3.4 (L) 3.5 - 5.3 mmol/L    Chloride 106 98 - 107 mmol/L    Bicarbonate 27 21 - 32 mmol/L    Anion Gap 10 10 - 20 mmol/L    Urea Nitrogen 25 (H) 6 - 23  mg/dL    Creatinine 0.79 0.50 - 1.05 mg/dL    eGFR 80 >60 mL/min/1.73m*2    Calcium 8.9 8.6 - 10.3 mg/dL   CBC   Result Value Ref Range    WBC 9.8 4.4 - 11.3 x10*3/uL    nRBC 0.0 0.0 - 0.0 /100 WBCs    RBC 3.98 (L) 4.00 - 5.20 x10*6/uL    Hemoglobin 10.3 (L) 12.0 - 16.0 g/dL    Hematocrit 34.3 (L) 36.0 - 46.0 %    MCV 86 80 - 100 fL    MCH 25.9 (L) 26.0 - 34.0 pg    MCHC 30.0 (L) 32.0 - 36.0 g/dL    RDW 14.7 (H) 11.5 - 14.5 %    Platelets 331 150 - 450 x10*3/uL     *Note: Due to a large number of results and/or encounters for the requested time period, some results have not been displayed. A complete set of results can be found in Results Review.       CT abdomen pelvis w IV contrast    Result Date: 2/17/2025  Interpreted By:  Irvin Palma, STUDY: CT ABDOMEN PELVIS W IV CONTRAST;  2/17/2025 5:56 pm   INDICATION: Signs/Symptoms:abd pain.     COMPARISON: 06/19/2019   ACCESSION NUMBER(S): [id number]   ORDERING CLINICIAN: CLARISSE BAY   TECHNIQUE: Contiguous axial images of the abdomen and pelvis were obtained after the intravenous administration of iodinated contrast. Coronal and sagittal reformatted images were reconstructed from the axial data.   FINDINGS: LOWER CHEST: No acute abnormality.     ABDOMEN/PELVIS:   ABDOMINAL WALL: Small fat-containing right inguinal hernia.   LIVER: No significant parenchymal abnormality.   BILE DUCTS: No significant intrahepatic or extrahepatic dilatation.   GALLBLADDER: No significant abnormality.   PANCREAS: There is diffuse atrophy of the pancreas progressed from prior study. There is increased size of a now 1.8 cm x 1.4 cm cyst in the tail the previously measured 1.3 cm x 1 cm. Additional 0.9 cm x 0.8 cm hypodensity in the pancreatic body that is not definitively seen on prior study.   SPLEEN: No significant abnormality.   ADRENALS: No significant abnormality.   KIDNEYS, URETERS, BLADDER: No hydroureteronephrosis or nephroureterolithiasis. Simple 3.8 cm right lower pole  renal cyst noted. Stable 1 cm cyst upper pole right kidney. The urinary bladder wall thickness appears within normal limits for degree of distention.   REPRODUCTIVE ORGANS: Surgically absent.   VESSELS: Mild aortic atherosclerosis without AAA.   RETROPERITONEUM/LYMPH NODES: No acute retroperitoneal abnormality. No enlarged lymph nodes.   BOWEL/PERITONEUM:  No inflammatory bowel wall thickening or dilatation. The appendix is not identified; however, there are no pericecal inflammatory changes.   No ascites, free air, or fluid collection.     MUSCULOSKELETAL: No acute osseous abnormality. No suspicious osseous lesion.       Increased size of pancreatic tail cyst measuring 1.8 cm x 1.4 cm and new somewhat ill-defined hypodense lesion in the pancreatic body measuring 0.9 cm x 0.9 cm that could relate to additional cyst or solid malignancy. Timely follow-up with MRI pancreatic protocol is recommended. YELLOW ALERT: An incidental finding alert was sent per protocol.   No acute process in the abdomen or pelvis.   MACRO: Critical Finding:  New lesion in the pancreas. Notification was initiated on 2/17/2025 at 6:13 pm by  Irvin Palma.  (**-YCF-**) Instructions:  MR Abdomen w and wo IV contrast. 1 week.   Signed by: Irvin Palma 2/17/2025 6:13 PM Dictation workstation:   [id number]    BI mammo bilateral screening tomosynthesis    Result Date: 2/6/2025  Interpreted By:  Angie Cruz, STUDY: BI MAMMO BILATERAL SCREENING TOMOSYNTHESIS;  2/5/2025 8:40 am   ACCESSION NUMBER(S): [id number]   ORDERING CLINICIAN: SHIRLENE SIBLEY   INDICATION: Screening. Benign excisional left breast biopsy. Family history of breast cancer   ,Z12.31 Encounter for screening mammogram for malignant neoplasm of breast   COMPARISON: 01/19/2024, 01/18/2023, 01/17/2022   FINDINGS: 2D and tomosynthesis images were reviewed at 1 mm slice thickness.   Density:  The breasts are heterogeneously dense, which may obscure small masses.   Stable mild  "postsurgical scarring in the upper outer left breast. No suspicious masses or calcifications are identified.       No mammographic evidence of malignancy.   BI-RADS CATEGORY: BI-RADS Category:  2 Benign. Recommendation:  Annual Screening. Recommended Date:  1 Year. Laterality:  Bilateral.       For any future breast imaging appointments, please call [phone]).     MACRO: None   Signed by: Angie Cruz 2/6/2025 2:29 PM Dictation workstation:   [id number]                    Assessment/Plan   Assessment & Plan  Intractable pain  Dacia Babcock \"Allyssa" is a 69 y.o. female with history of endometrial carcinoma status post ROXANA/BSO and IPMN presenting with pelvic cramping and right flank pain for 3 days.  Patient thought that she may have a UTI so went to urgent care and was told she did not have a UTI and was encouraged to go to the ED for further evaluation.  Here in the ED, patient did have positive leukocyte esterase and white blood cells in her urine.  She was started on ceftriaxone and was given Dilaudid x 2 for the pain.  CT of the abdomen/pelvis showed increased size of pancreatic tail cyst and new lesion in the pancreatic body with recommendation for MRI follow-up.  Patient most recently had an MRCP pancreas protocol in November, which was stable from previous imaging.     Pelvic pain/ Right flank pain  - UA with some leuks, not overtly convincing but and improvement sicne starting abx will continue rocephin   -await culture   -no leukocytosis, a febrile     Pancreatic lesion  - CT showed new pancreatic lesion   -MRCP 11/14/24    DM  -Accu-Cheks ACHS  -Humalog correctional sliding scale  -continue glipizide  -hold metformin    HTN  -Resume home meds    DVT prophylaxis:  Lovenox, SCD    DC plan:  DC home when medically stable    Labs/Testing reviewed    Interdisciplinary team rounding completed with hospitalist, nurse, TCC    NP discussed plan and lab/testing results with Dr. Bean         I spent " 45 minutes in the professional and overall care of this patient.      Sandra Hernandez, APRN-CNP

## 2025-02-18 NOTE — NURSING NOTE
IV removed. Owen , discharge RN went over the AVS with pt and spouse. Belongings secured with pt prior to discharge. Pt discharged to home.

## 2025-02-18 NOTE — PROGRESS NOTES
"Dacia Babcock \"Allyssa" is a 69 y.o. female on day 0 of admission presenting with Intractable pain.    Spoke with patient to discuss her preferences for care. Discussed how patient manages health at home. Lives home with spouse in inlaw suite at her daughters home. Does have 12 stairs she needs to do each time she uses the restroom. Denies any difficulty at this time with doing the stairs. Does state since getting CA treatment her balance has been a little off, but nothing serious. Independent in all ADLS.  No home O2, dialysis, or assistive devices. Patient denies any problems getting to appointments or obtaining/affording medications.  No additional resources or needs identified.    Plan: admitted for bladder pain, awaiting MRCP, NPO at this time. Receiving IV abx at this time  Status: obs  Payor: [employer]  Disposition: Home with spouse/ daughter  Barrier: MRCP, iv abx  ADOD:   1-2 days       02/18/25 1029   Discharge Planning   Living Arrangements Children;Spouse/significant other   Support Systems Children;Spouse/significant other   Assistance Needed independent at baseline, does have walker/cane at home if needed   Type of Residence Private residence   Number of Stairs to Enter Residence 2   Number of Stairs Within Residence 12   Do you have animals or pets at home? Yes   Type of Animals or Pets 2 dogs- selina and judy   Who is requesting discharge planning? Provider   Home or Post Acute Services None   Expected Discharge Disposition Home   Does the patient need discharge transport arranged? No   Patient Choice   Patient / Family choosing to utilize agency / facility established prior to hospitalization No   Stroke Family Assessment   Stroke Family Assessment Needed No   Intensity of Service   Intensity of Service >30 min       Ramona Baker RN      "

## 2025-02-18 NOTE — CARE PLAN
The patient's goals for the shift include      The clinical goals for the shift include pain management      Problem: Pain - Adult  Goal: Verbalizes/displays adequate comfort level or baseline comfort level  Outcome: Progressing     Problem: Safety - Adult  Goal: Free from fall injury  Outcome: Progressing     Problem: Discharge Planning  Goal: Discharge to home or other facility with appropriate resources  Outcome: Progressing     Problem: Chronic Conditions and Co-morbidities  Goal: Patient's chronic conditions and co-morbidity symptoms are monitored and maintained or improved  Outcome: Progressing     Problem: Nutrition  Goal: Nutrient intake appropriate for maintaining nutritional needs  Outcome: Progressing     Problem: Diabetes  Goal: Maintain glucose levels >70mg/dl to <250mg/dl throughout shift  Outcome: Progressing     Problem: Fall/Injury  Goal: Not fall by end of shift  Outcome: Progressing  Goal: Be free from injury by end of the shift  Outcome: Progressing     Problem: Pain  Goal: Walks with improved pain control throughout the shift  Outcome: Progressing  Goal: Performs ADL's with improved pain control throughout shift  Outcome: Progressing  Goal: Free from opioid side effects throughout the shift  Outcome: Progressing

## 2025-02-18 NOTE — CARE PLAN
The clinical goals for the shift include Pt will have decreased abd pain    Problem: Safety - Adult  Goal: Free from fall injury  Outcome: Progressing     Problem: Discharge Planning  Goal: Discharge to home or other facility with appropriate resources  Outcome: Progressing     Problem: Nutrition  Goal: Nutrient intake appropriate for maintaining nutritional needs  Outcome: Progressing     Over the shift, the patient did not make progress toward the following goals. Barriers to progression include acute pain     Problem: Pain - Adult  Goal: Verbalizes/displays adequate comfort level or baseline comfort level  Outcome: Not Progressing

## 2025-02-18 NOTE — H&P
"History Of Present Illness  Dacia Babcock \"Allyssa" is a 69 y.o. female with history of endometrial carcinoma status post ROXANA/BSO and IPMN presenting with pelvic cramping and right flank pain for 3 days.  Patient thought that she may have a UTI so went to urgent care and was told she did not have a UTI and was encouraged to go to the ED for further evaluation.  Here in the ED, patient did have positive leukocyte esterase and white blood cells in her urine.  She was started on ceftriaxone and was given Dilaudid x 2 for the pain.  CT of the abdomen/pelvis showed increased size of pancreatic tail cyst and new lesion in the pancreatic body with recommendation for MRI follow-up.  Patient most recently had an MRCP pancreas protocol in November, which was stable from previous imaging.     Past Medical History  Past Medical History:   Diagnosis Date    Breast cyst 1990    Candidal stomatitis 07/06/2021    Thrush    Endometrial cancer (Multi) 2019    Erythema intertrigo 10/30/2020    Intertrigo    Essential (primary) hypertension     Benign essential hypertension    Fibrocystic breast 2015    Herpes zoster 07/24/2024    Other specified abnormal findings of blood chemistry 12/01/2021    D-dimer, elevated    Other specified diseases of pancreas 10/23/2019    Pancreatic mass    Other tear of medial meniscus, current injury, right knee, initial encounter 06/25/2021    Acute medial meniscus tear of right knee, initial encounter    Other tear of medial meniscus, current injury, right knee, subsequent encounter 12/04/2020    Tear of medial meniscus of right knee, current, unspecified tear type, subsequent encounter    Other tear of medial meniscus, current injury, unspecified knee, initial encounter 11/13/2020    Tear of medial meniscus of knee    Personal history of neoplasm of uncertain behavior     History of neoplasm of uncertain behavior    Personal history of other diseases of the nervous system and sense organs " 11/06/2015    History of impacted cerumen    Personal history of other diseases of the nervous system and sense organs 02/05/2020    History of tremor    Personal history of other diseases of the respiratory system     Personal history of asthma    Personal history of other endocrine, nutritional and metabolic disease     History of hypoglycemia    Personal history of other infectious and parasitic diseases 06/13/2015    History of herpes zoster    Personal history of other mental and behavioral disorders 10/02/2013    History of anxiety disorder    Personal history of other specified conditions 02/08/2014    History of wheezing    Plantar fascial fibromatosis 01/08/2019    Plantar fasciitis    Trigger finger, unspecified finger 03/18/2022    Trigger finger, acquired    Trochanteric bursitis, left hip 06/23/2017    Trochanteric bursitis of left hip    Unspecified fracture of shaft of unspecified radius, initial encounter for closed fracture 04/02/2018    Fracture, radius, shaft    Unspecified internal derangement of right knee 08/24/2021    Internal derangement of right knee       Surgical History  Past Surgical History:   Procedure Laterality Date    APPENDECTOMY  10/02/2013    Appendectomy    BREAST BIOPSY  2013    BREAST CYST EXCISION  2013    BREAST LUMPECTOMY  10/02/2013    Breast Surgery Lumpectomy    CT ANGIO NECK  03/23/2023    CT NECK ANGIO W AND WO IV CONTRAST [facility]    CT HEAD ANGIO W AND WO IV CONTRAST  03/23/2023    CT HEAD ANGIO W AND WO IV CONTRAST [facility]    HYSTERECTOMY  2019    MR HEAD ANGIO WO IV CONTRAST  11/11/2020    MR HEAD ANGIO WO IV CONTRAST 11/11/2020 [facility]    OTHER SURGICAL HISTORY  10/02/2013    Anal Fissurectomy    OTHER SURGICAL HISTORY  10/22/2019    Hysterectomy    OTHER SURGICAL HISTORY  10/22/2019    Wrist fracture repair    OTHER SURGICAL HISTORY  04/02/2021    Knee arthroscopy    OTHER SURGICAL HISTORY  04/02/2021    Salpingo-oophorectomy bilateral    TUBAL LIGATION  " 10/02/2013    Tubal Ligation        Social History  She reports that she has never smoked. She has never been exposed to tobacco smoke. She has never used smokeless tobacco. She reports that she does not currently use alcohol. She reports that she does not currently use drugs.    Family History  Family History   Problem Relation Name Age of Onset    Alzheimer's disease Mother      Brain cancer Mother      Heart failure Mother      Kidney cancer Father          clear cell adenocarcinoma of the kidney    Kidney cancer Sister      Breast cancer Maternal Grandmother          Allergies  Amoxicillin-pot clavulanate, Calcipotriene, Ephedrine, Tetracycline, and Propoxyphene-acetaminophen    Review of Systems   All other systems reviewed and are negative.       Physical Exam  Constitutional:       General: She is not in acute distress.     Appearance: Normal appearance.   HENT:      Head: Normocephalic and atraumatic.      Nose: Nose normal.      Mouth/Throat:      Mouth: Mucous membranes are moist.      Pharynx: Oropharynx is clear.   Eyes:      Conjunctiva/sclera: Conjunctivae normal.      Pupils: Pupils are equal, round, and reactive to light.   Pulmonary:      Effort: Pulmonary effort is normal. No respiratory distress.   Musculoskeletal:         General: No swelling, deformity or signs of injury.      Cervical back: Normal range of motion and neck supple.   Skin:     General: Skin is warm and dry.   Neurological:      General: No focal deficit present.      Mental Status: She is alert and oriented to person, place, and time. Mental status is at baseline.   Psychiatric:         Attention and Perception: Attention and perception normal.         Mood and Affect: Mood normal.         Behavior: Behavior normal.         Judgment: Judgment normal.          Last Recorded Vitals  Blood pressure 139/79, pulse 69, temperature 36.3 °C (97.3 °F), temperature source Temporal, resp. rate 20, height 1.626 m (5' 4\"), weight 87.1 kg " (192 lb), SpO2 96%.    Relevant Results        Results for orders placed or performed during the hospital encounter of 02/17/25 (from the past 24 hours)   CBC and Auto Differential   Result Value Ref Range    WBC 10.4 4.4 - 11.3 x10*3/uL    nRBC 0.0 0.0 - 0.0 /100 WBCs    RBC 4.32 4.00 - 5.20 x10*6/uL    Hemoglobin 11.2 (L) 12.0 - 16.0 g/dL    Hematocrit 36.4 36.0 - 46.0 %    MCV 84 80 - 100 fL    MCH 25.9 (L) 26.0 - 34.0 pg    MCHC 30.8 (L) 32.0 - 36.0 g/dL    RDW 14.6 (H) 11.5 - 14.5 %    Platelets 410 150 - 450 x10*3/uL    Neutrophils % 56.5 40.0 - 80.0 %    Immature Granulocytes %, Automated 0.2 0.0 - 0.9 %    Lymphocytes % 33.4 13.0 - 44.0 %    Monocytes % 6.2 2.0 - 10.0 %    Eosinophils % 2.5 0.0 - 6.0 %    Basophils % 1.2 0.0 - 2.0 %    Neutrophils Absolute 5.85 (H) 1.60 - 5.50 x10*3/uL    Immature Granulocytes Absolute, Automated 0.02 0.00 - 0.50 x10*3/uL    Lymphocytes Absolute 3.46 (H) 0.80 - 3.00 x10*3/uL    Monocytes Absolute 0.64 0.05 - 0.80 x10*3/uL    Eosinophils Absolute 0.26 0.00 - 0.40 x10*3/uL    Basophils Absolute 0.12 (H) 0.00 - 0.10 x10*3/uL   Comprehensive metabolic panel   Result Value Ref Range    Glucose 124 (H) 74 - 99 mg/dL    Sodium 142 136 - 145 mmol/L    Potassium 3.5 3.5 - 5.3 mmol/L    Chloride 107 98 - 107 mmol/L    Bicarbonate 24 21 - 32 mmol/L    Anion Gap 15 10 - 20 mmol/L    Urea Nitrogen 27 (H) 6 - 23 mg/dL    Creatinine 0.91 0.50 - 1.05 mg/dL    eGFR 68 >60 mL/min/1.73m*2    Calcium 9.5 8.6 - 10.3 mg/dL    Albumin 4.0 3.4 - 5.0 g/dL    Alkaline Phosphatase 89 33 - 136 U/L    Total Protein 7.0 6.4 - 8.2 g/dL    AST 11 9 - 39 U/L    Bilirubin, Total 0.3 0.0 - 1.2 mg/dL    ALT 11 7 - 45 U/L   Lipase   Result Value Ref Range    Lipase 16 9 - 82 U/L   Lactate   Result Value Ref Range    Lactate 0.8 0.4 - 2.0 mmol/L   Urinalysis with Reflex Culture and Microscopic   Result Value Ref Range    Color, Urine Light-Yellow Light-Yellow, Yellow, Dark-Yellow    Appearance, Urine Clear Clear     Specific Gravity, Urine 1.027 1.005 - 1.035    pH, Urine 6.5 5.0, 5.5, 6.0, 6.5, 7.0, 7.5, 8.0    Protein, Urine NEGATIVE NEGATIVE, 10 (TRACE), 20 (TRACE) mg/dL    Glucose, Urine 30 (TRACE) (A) Normal mg/dL    Blood, Urine NEGATIVE NEGATIVE mg/dL    Ketones, Urine NEGATIVE NEGATIVE mg/dL    Bilirubin, Urine NEGATIVE NEGATIVE mg/dL    Urobilinogen, Urine Normal Normal mg/dL    Nitrite, Urine NEGATIVE NEGATIVE    Leukocyte Esterase, Urine 250 Richar/uL (A) NEGATIVE   Microscopic Only, Urine   Result Value Ref Range    WBC, Urine 6-10 (A) 1-5, NONE /HPF    RBC, Urine 3-5 NONE, 1-2, 3-5 /HPF    Squamous Epithelial Cells, Urine 1-9 (SPARSE) Reference range not established. /HPF    Mucus, Urine 2+ Reference range not established. /LPF    Hyaline Casts, Urine OCCASIONAL (A) NONE /LPF   Sars-CoV-2 and Influenza A/B PCR   Result Value Ref Range    Flu A Result Not Detected Not Detected    Flu B Result Not Detected Not Detected    Coronavirus 2019, PCR Not Detected Not Detected       CT abdomen pelvis w IV contrast  Result Date: 2/17/2025  Increased size of pancreatic tail cyst measuring 1.8 cm x 1.4 cm and new somewhat ill-defined hypodense lesion in the pancreatic body measuring 0.9 cm x 0.9 cm that could relate to additional cyst or solid malignancy. Timely follow-up with MRI pancreatic protocol is recommended. YELLOW ALERT: An incidental finding alert was sent per protocol.   No acute process in the abdomen or pelvis.   MACRO: Critical Finding:  New lesion in the pancreas. Notification was initiated on 2/17/2025 at 6:13 pm by  Irvin Palma.  (**-Y[facility]-**) Instructions:  MR Abdomen w and wo IV contrast. 1 week.   Signed by: Irvin Palma 2/17/2025 6:13 PM Dictation workstation:   [id number]       Assessment/Plan   Assessment & Plan  Intractable pain      Bladder pain  Will treat for UTI with Rocephin  Urine culture pending  Continue PRN pain meds PO/IV  Pancreatic mass  Will order follow up MRCP pancreas w/wo IV  contrast  Premedicate with Ativan for claustrophobia   Consult patient's pancreatic oncologist  Diabetes  Hold metformin post-IV contrast  Continue glipizide  CCD  SSI  Hypertension  Stable  Continue home amlodipine             Keily Woods MD     until 1 months cardio 3-4 x week x 20-30 mins

## 2025-02-19 ENCOUNTER — TELEPHONE (OUTPATIENT)
Dept: SURGICAL ONCOLOGY | Facility: CLINIC | Age: 70
End: 2025-02-19
Payer: MEDICARE

## 2025-02-19 NOTE — TELEPHONE ENCOUNTER
I had called and left a VM for the pt and she returned my call. I spoke to her and she states she is still a little drowsy from the meds in the hospital but nit having the intense abd pain. She was very appreciative of the msg and reassured that the pancreas MRI was reviewed with Dr. De La Rosa and ok. I did instruct her her next MRI is a year from this one. She is taking robaxin and has an appt with her new PCP in March. She knows to call if any questions and follow up on his discharge instructions.

## 2025-03-12 ENCOUNTER — APPOINTMENT (OUTPATIENT)
Dept: PRIMARY CARE | Facility: CLINIC | Age: 70
End: 2025-03-12
Payer: MEDICARE

## 2025-03-12 VITALS
HEIGHT: 64 IN | SYSTOLIC BLOOD PRESSURE: 104 MMHG | HEART RATE: 97 BPM | WEIGHT: 191 LBS | BODY MASS INDEX: 32.61 KG/M2 | DIASTOLIC BLOOD PRESSURE: 70 MMHG | OXYGEN SATURATION: 96 %

## 2025-03-12 DIAGNOSIS — Z00.00 HEALTHCARE MAINTENANCE: ICD-10-CM

## 2025-03-12 DIAGNOSIS — Z00.00 ROUTINE GENERAL MEDICAL EXAMINATION AT HEALTH CARE FACILITY: ICD-10-CM

## 2025-03-12 DIAGNOSIS — G44.89 OTHER HEADACHE SYNDROME: ICD-10-CM

## 2025-03-12 DIAGNOSIS — E11.42 DIABETIC POLYNEUROPATHY ASSOCIATED WITH TYPE 2 DIABETES MELLITUS (MULTI): ICD-10-CM

## 2025-03-12 DIAGNOSIS — Z12.11 ENCOUNTER FOR SCREENING FOR MALIGNANT NEOPLASM OF COLON: ICD-10-CM

## 2025-03-12 DIAGNOSIS — D13.6 BENIGN NEOPLASM OF PANCREAS: ICD-10-CM

## 2025-03-12 DIAGNOSIS — K21.9 GASTROESOPHAGEAL REFLUX DISEASE WITHOUT ESOPHAGITIS: ICD-10-CM

## 2025-03-12 DIAGNOSIS — G44.89 CHRONIC MIXED HEADACHE SYNDROME: ICD-10-CM

## 2025-03-12 DIAGNOSIS — E11.9 TYPE 2 DIABETES MELLITUS WITHOUT COMPLICATION, WITHOUT LONG-TERM CURRENT USE OF INSULIN (MULTI): ICD-10-CM

## 2025-03-12 DIAGNOSIS — D49.0 IPMN (INTRADUCTAL PAPILLARY MUCINOUS NEOPLASM): ICD-10-CM

## 2025-03-12 DIAGNOSIS — Z00.00 MEDICARE ANNUAL WELLNESS VISIT, SUBSEQUENT: Primary | ICD-10-CM

## 2025-03-12 DIAGNOSIS — F32.0 MILD MAJOR DEPRESSION, SINGLE EPISODE (CMS-HCC): ICD-10-CM

## 2025-03-12 DIAGNOSIS — L40.50 PA (PSORIATIC ARTHRITIS) (MULTI): ICD-10-CM

## 2025-03-12 DIAGNOSIS — C54.1 ENDOMETRIAL CARCINOMA (MULTI): ICD-10-CM

## 2025-03-12 DIAGNOSIS — I10 BENIGN ESSENTIAL HYPERTENSION: ICD-10-CM

## 2025-03-12 DIAGNOSIS — E78.2 HYPERLIPEMIA, MIXED: ICD-10-CM

## 2025-03-12 DIAGNOSIS — E53.8 VITAMIN B 12 DEFICIENCY: ICD-10-CM

## 2025-03-12 PROCEDURE — 1036F TOBACCO NON-USER: CPT | Performed by: STUDENT IN AN ORGANIZED HEALTH CARE EDUCATION/TRAINING PROGRAM

## 2025-03-12 PROCEDURE — 99214 OFFICE O/P EST MOD 30 MIN: CPT | Performed by: STUDENT IN AN ORGANIZED HEALTH CARE EDUCATION/TRAINING PROGRAM

## 2025-03-12 PROCEDURE — 3078F DIAST BP <80 MM HG: CPT | Performed by: STUDENT IN AN ORGANIZED HEALTH CARE EDUCATION/TRAINING PROGRAM

## 2025-03-12 PROCEDURE — 99397 PER PM REEVAL EST PAT 65+ YR: CPT | Performed by: STUDENT IN AN ORGANIZED HEALTH CARE EDUCATION/TRAINING PROGRAM

## 2025-03-12 PROCEDURE — 1170F FXNL STATUS ASSESSED: CPT | Performed by: STUDENT IN AN ORGANIZED HEALTH CARE EDUCATION/TRAINING PROGRAM

## 2025-03-12 PROCEDURE — G0446 INTENS BEHAVE THER CARDIO DX: HCPCS | Performed by: STUDENT IN AN ORGANIZED HEALTH CARE EDUCATION/TRAINING PROGRAM

## 2025-03-12 PROCEDURE — 1124F ACP DISCUSS-NO DSCNMKR DOCD: CPT | Performed by: STUDENT IN AN ORGANIZED HEALTH CARE EDUCATION/TRAINING PROGRAM

## 2025-03-12 PROCEDURE — 3074F SYST BP LT 130 MM HG: CPT | Performed by: STUDENT IN AN ORGANIZED HEALTH CARE EDUCATION/TRAINING PROGRAM

## 2025-03-12 PROCEDURE — G0439 PPPS, SUBSEQ VISIT: HCPCS | Performed by: STUDENT IN AN ORGANIZED HEALTH CARE EDUCATION/TRAINING PROGRAM

## 2025-03-12 PROCEDURE — 3008F BODY MASS INDEX DOCD: CPT | Performed by: STUDENT IN AN ORGANIZED HEALTH CARE EDUCATION/TRAINING PROGRAM

## 2025-03-12 PROCEDURE — 1159F MED LIST DOCD IN RCRD: CPT | Performed by: STUDENT IN AN ORGANIZED HEALTH CARE EDUCATION/TRAINING PROGRAM

## 2025-03-12 RX ORDER — GABAPENTIN 300 MG/1
300 CAPSULE ORAL NIGHTLY
Qty: 90 CAPSULE | Refills: 1 | Status: SHIPPED | OUTPATIENT
Start: 2025-03-12

## 2025-03-12 RX ORDER — OLMESARTAN MEDOXOMIL AND HYDROCHLOROTHIAZIDE 40/25 40; 25 MG/1; MG/1
1 TABLET ORAL DAILY
Qty: 90 TABLET | Refills: 1 | Status: SHIPPED | OUTPATIENT
Start: 2025-03-12

## 2025-03-12 RX ORDER — OMEPRAZOLE 20 MG/1
20 CAPSULE, DELAYED RELEASE ORAL 2 TIMES DAILY
Qty: 180 CAPSULE | Refills: 1 | Status: SHIPPED | OUTPATIENT
Start: 2025-03-12

## 2025-03-12 RX ORDER — LANOLIN ALCOHOL/MO/W.PET/CERES
1000 CREAM (GRAM) TOPICAL DAILY
Qty: 90 TABLET | Refills: 1 | Status: SHIPPED | OUTPATIENT
Start: 2025-03-12

## 2025-03-12 RX ORDER — GABAPENTIN 100 MG/1
100 CAPSULE ORAL
Qty: 180 CAPSULE | Refills: 1 | Status: SHIPPED | OUTPATIENT
Start: 2025-03-12

## 2025-03-12 RX ORDER — TRIAMCINOLONE ACETONIDE 1 MG/G
OINTMENT TOPICAL 2 TIMES DAILY PRN
Qty: 80 G | Refills: 3 | Status: SHIPPED | OUTPATIENT
Start: 2025-03-12 | End: 2025-07-10

## 2025-03-12 RX ORDER — ATORVASTATIN CALCIUM 10 MG/1
10 TABLET, FILM COATED ORAL
Qty: 90 TABLET | Refills: 1 | Status: SHIPPED | OUTPATIENT
Start: 2025-03-12

## 2025-03-12 RX ORDER — AMLODIPINE BESYLATE 5 MG/1
5 TABLET ORAL DAILY
Qty: 90 TABLET | Refills: 1 | Status: SHIPPED | OUTPATIENT
Start: 2025-03-12 | End: 2025-09-08

## 2025-03-12 ASSESSMENT — ENCOUNTER SYMPTOMS
OCCASIONAL FEELINGS OF UNSTEADINESS: 1
LOSS OF SENSATION IN FEET: 1
DEPRESSION: 0

## 2025-03-12 ASSESSMENT — ACTIVITIES OF DAILY LIVING (ADL)
MANAGING_FINANCES: INDEPENDENT
GROCERY_SHOPPING: INDEPENDENT
TAKING_MEDICATION: INDEPENDENT
DRESSING: INDEPENDENT
BATHING: INDEPENDENT
DOING_HOUSEWORK: INDEPENDENT

## 2025-03-12 NOTE — PROGRESS NOTES
Subjective   Reason for Visit: Dacia Babcock is an 69 y.o. female here for a Medicare Wellness visit and new patient visit.          Reviewed all medications by prescribing practitioner or clinical pharmacist (such as prescriptions, OTCs, herbal therapies and supplements) and documented in the medical record.    HPI  Medicare wellness visit/new patient visit/health maintenance  Overall patient is doing well.   Denies any chest pain, shortness of breath or wheezing upon exertion.  Denies any fever chills or constitutional symptoms, denies any unintentional weight loss.  Denies any nausea/vomiting or constipation/diarrhea.  Denies any urinary symptoms.  Denies any recent change in hearing or vision.  Denies any lightheadedness, dizziness or balance problem   Immunization: Tdap 3/2017 influenza vaccine 10/2022  Shingrix UTD 2 doses PCV 20 recommended; PPSV23 2016  COVID-19 vaccine (Pfizer Moderna) 4 doses:  Colon Cancer Screening: No family history, colonoscopy performed in November 2019 with 5-year clearance, needs new referral  OB/GYN: History of endometrial carcinoma status post ROXANA/BSO and mammogram 2/2025 is up to date.  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  Patient is compliant and denies any side effects.  Lipid panel from December 2023 shows cholesterol 156, LDL 62 and triglyceride 114  There is no CT cardiac  calcium score on chart    Diabetes/neuropathy  Patient has a long history of diabetes treated with metformin 1000 mg twice daily and glipizide 2.5 mg daily, tolerating well  Follows regularly with endocrinology, with regular foot exam and eye exam by ophthalmology  Most recent hemoglobin A1c in July 2024 was 6.8%  Patient could not tolerate Januvia, and cannot use GLP-1's due to pancreatic lesions  For her peripheral neuropathy uses gabapentin 100 mg morning and midday, and 300 mg at bedtime  Stated that the gabapentin helps, but makes her feel tired.  Willing to continue same regimen, and request refills    Vitamin B12 deficiency  He has a history of vitamin B12 deficiency and uses vitamin B12 supplements 1 mg/day  Requesting repeat refill and checking her vitamin B12 levels    Depression  Patient has depression diagnosed when she was diagnosed with endometrial cancer her back in 2019.  At that time she was started on low-dose sertraline 25 mg daily  Patient is tolerating well but admits that her symptoms have completely resolved, and she feels she does not need to be on this medication any longer.  Requesting recommendation to taper it off    Endometrial cancer   Has a history of endometrial cancer diagnosed in 2019  Follows regularly with surgical oncology, Dr. De La Rosa, who also follows for her pancreatic lesions    GERD  Has a history of GERD and uses omeprazole with good results  Requesting refills    Psoriasis with arthritis  Patient has a long history of psoriasis and follows regularly with dermatology.  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    Follows with Dr. De La Rosa with yearly MRI    Allergies   Allergen Reactions    Amoxicillin-Pot Clavulanate Unknown    Calcipotriene Unknown    Ephedrine Other    Propoxyphene-Acetaminophen Itching and Rash    Tetracycline GI Upset       Immunization History   Administered Date(s) Administered    Influenza, Unspecified 10/07/2022    Influenza, seasonal, injectable 10/07/2022    Moderna COVID-19 vaccine, 12 years and older (50mcg/0.5mL)(Spikevax) 10/16/2023    Moderna SARS-CoV-2 Vaccination 02/11/2021, 03/11/2021, 10/27/2021    Pneumococcal polysaccharide vaccine, 23-valent, age 2 years and older (PNEUMOVAX 23) 09/15/2016    Tdap vaccine, age 7 year and older (BOOSTRIX, ADACEL) 03/18/2017    Zoster, Unspecified 09/20/2022, 12/02/2022    Zoster, live 12/08/2022       Patient Self Assessment of Health Status  Patient Self Assessment: Fair    Nutrition and Exercise  Current Diet: Well Balanced Diet  Adequate Fluid Intake: Yes  Caffeine: No  Exercise Frequency: Regularly    Functional Ability/Level of Safety  Cognitive Impairment Observed: No cognitive impairment observed    Home Safety Risk Factors: None    Patient Care Team:  Gerry Herzog DO as PCP - General (Family Medicine)  Maria G Butler Pla, DO as PCP - [employer] PCP     Review of Systems  All pertinent positive symptoms are included in the history of present illness.    All other systems have been reviewed and are negative and noncontributory to this patient's current ailments.    Objective   Vitals:  /70 (BP Location: Left arm, Patient Position: Sitting, BP Cuff Size: Large adult)   Pulse 97   Ht 1.626 m (5' 4\")  "  Wt 86.6 kg (191 lb)   SpO2 96%   BMI 32.79 kg/m²     Admission on 02/17/2025, Discharged on 02/18/2025   Component Date Value Ref Range Status    WBC 02/17/2025 10.4  4.4 - 11.3 x10*3/uL Final    nRBC 02/17/2025 0.0  0.0 - 0.0 /100 WBCs Final    RBC 02/17/2025 4.32  4.00 - 5.20 x10*6/uL Final    Hemoglobin 02/17/2025 11.2 (L)  12.0 - 16.0 g/dL Final    Hematocrit 02/17/2025 36.4  36.0 - 46.0 % Final    MCV 02/17/2025 84  80 - 100 fL Final    MCH 02/17/2025 25.9 (L)  26.0 - 34.0 pg Final    MCHC 02/17/2025 30.8 (L)  32.0 - 36.0 g/dL Final    RDW 02/17/2025 14.6 (H)  11.5 - 14.5 % Final    Platelets 02/17/2025 410  150 - 450 x10*3/uL Final    Neutrophils % 02/17/2025 56.5  40.0 - 80.0 % Final    Immature Granulocytes %, Automated 02/17/2025 0.2  0.0 - 0.9 % Final    Immature Granulocyte Count (IG) includes promyelocytes, myelocytes and metamyelocytes but does not include bands. Percent differential counts (%) should be interpreted in the context of the absolute cell counts (cells/UL).    Lymphocytes % 02/17/2025 33.4  13.0 - 44.0 % Final    Monocytes % 02/17/2025 6.2  2.0 - 10.0 % Final    Eosinophils % 02/17/2025 2.5  0.0 - 6.0 % Final    Basophils % 02/17/2025 1.2  0.0 - 2.0 % Final    Neutrophils Absolute 02/17/2025 5.85 (H)  1.60 - 5.50 x10*3/uL Final    Percent differential counts (%) should be interpreted in the context of the absolute cell counts (cells/uL).    Immature Granulocytes Absolute, Au* 02/17/2025 0.02  0.00 - 0.50 x10*3/uL Final    Lymphocytes Absolute 02/17/2025 3.46 (H)  0.80 - 3.00 x10*3/uL Final    Monocytes Absolute 02/17/2025 0.64  0.05 - 0.80 x10*3/uL Final    Eosinophils Absolute 02/17/2025 0.26  0.00 - 0.40 x10*3/uL Final    Basophils Absolute 02/17/2025 0.12 (H)  0.00 - 0.10 x10*3/uL Final    Glucose 02/17/2025 124 (H)  74 - 99 mg/dL Final    Sodium 02/17/2025 142  136 - 145 mmol/L Final    Potassium 02/17/2025 3.5  3.5 - 5.3 mmol/L Final    Chloride 02/17/2025 107  98 - 107 mmol/L  Final    Bicarbonate 02/17/2025 24  21 - 32 mmol/L Final    Anion Gap 02/17/2025 15  10 - 20 mmol/L Final    Urea Nitrogen 02/17/2025 27 (H)  6 - 23 mg/dL Final    Creatinine 02/17/2025 0.91  0.50 - 1.05 mg/dL Final    eGFR 02/17/2025 68  >60 mL/min/1.73m*2 Final    Calculations of estimated GFR are performed using the 2021 CKD-EPI Study Refit equation without the race variable for the IDMS-Traceable creatinine methods.  https://jasn.asnjournals.org/content/early/2021/09/22/ASN.0999950179    Calcium 02/17/2025 9.5  8.6 - 10.3 mg/dL Final    Albumin 02/17/2025 4.0  3.4 - 5.0 g/dL Final    Alkaline Phosphatase 02/17/2025 89  33 - 136 U/L Final    Total Protein 02/17/2025 7.0  6.4 - 8.2 g/dL Final    AST 02/17/2025 11  9 - 39 U/L Final    Bilirubin, Total 02/17/2025 0.3  0.0 - 1.2 mg/dL Final    ALT 02/17/2025 11  7 - 45 U/L Final    Patients treated with Sulfasalazine may generate falsely decreased results for ALT.    Lipase 02/17/2025 16  9 - 82 U/L Final    Color, Urine 02/17/2025 Light-Yellow  Light-Yellow, Yellow, Dark-Yellow Final    Appearance, Urine 02/17/2025 Clear  Clear Final    Specific Gravity, Urine 02/17/2025 1.027  1.005 - 1.035 Final    pH, Urine 02/17/2025 6.5  5.0, 5.5, 6.0, 6.5, 7.0, 7.5, 8.0 Final    Protein, Urine 02/17/2025 NEGATIVE  NEGATIVE, 10 (TRACE), 20 (TRACE) mg/dL Final    Glucose, Urine 02/17/2025 30 (TRACE) (A)  Normal mg/dL Final    Blood, Urine 02/17/2025 NEGATIVE  NEGATIVE mg/dL Final    Ketones, Urine 02/17/2025 NEGATIVE  NEGATIVE mg/dL Final    Bilirubin, Urine 02/17/2025 NEGATIVE  NEGATIVE mg/dL Final    Urobilinogen, Urine 02/17/2025 Normal  Normal mg/dL Final    Nitrite, Urine 02/17/2025 NEGATIVE  NEGATIVE Final    Leukocyte Esterase, Urine 02/17/2025 250 Richar/uL (A)  NEGATIVE Final    Extra Tube 02/17/2025 Hold for add-ons.   Final    Auto resulted.    Flu A Result 02/17/2025 Not Detected  Not Detected Final    Flu B Result 02/17/2025 Not Detected  Not Detected Final     Coronavirus 2019, PCR 02/17/2025 Not Detected  Not Detected Final    Lactate 02/17/2025 0.8  0.4 - 2.0 mmol/L Final    WBC, Urine 02/17/2025 6-10 (A)  1-5, NONE /HPF Final    RBC, Urine 02/17/2025 3-5  NONE, 1-2, 3-5 /HPF Final    Squamous Epithelial Cells, Urine 02/17/2025 1-9 (SPARSE)  Reference range not established. /HPF Final    Mucus, Urine 02/17/2025 2+  Reference range not established. /LPF Final    Hyaline Casts, Urine 02/17/2025 OCCASIONAL (A)  NONE /LPF Final    Urine Culture 02/17/2025 20,000 - 80,000 CFU/mL Streptococcus agalactiae (Group B Streptococcus) (A)   Final    Glucose 02/18/2025 128 (H)  74 - 99 mg/dL Final    Sodium 02/18/2025 140  136 - 145 mmol/L Final    Potassium 02/18/2025 3.4 (L)  3.5 - 5.3 mmol/L Final    Chloride 02/18/2025 106  98 - 107 mmol/L Final    Bicarbonate 02/18/2025 27  21 - 32 mmol/L Final    Anion Gap 02/18/2025 10  10 - 20 mmol/L Final    Urea Nitrogen 02/18/2025 25 (H)  6 - 23 mg/dL Final    Creatinine 02/18/2025 0.79  0.50 - 1.05 mg/dL Final    eGFR 02/18/2025 80  >60 mL/min/1.73m*2 Final    Calculations of estimated GFR are performed using the 2021 CKD-EPI Study Refit equation without the race variable for the IDMS-Traceable creatinine methods.  https://jasn.asnjournals.org/content/early/2021/09/22/ASN.8429420609    Calcium 02/18/2025 8.9  8.6 - 10.3 mg/dL Final    WBC 02/18/2025 9.8  4.4 - 11.3 x10*3/uL Final    nRBC 02/18/2025 0.0  0.0 - 0.0 /100 WBCs Final    RBC 02/18/2025 3.98 (L)  4.00 - 5.20 x10*6/uL Final    Hemoglobin 02/18/2025 10.3 (L)  12.0 - 16.0 g/dL Final    Hematocrit 02/18/2025 34.3 (L)  36.0 - 46.0 % Final    MCV 02/18/2025 86  80 - 100 fL Final    MCH 02/18/2025 25.9 (L)  26.0 - 34.0 pg Final    MCHC 02/18/2025 30.0 (L)  32.0 - 36.0 g/dL Final    RDW 02/18/2025 14.7 (H)  11.5 - 14.5 % Final    Platelets 02/18/2025 331  150 - 450 x10*3/uL Final    POCT Glucose 02/18/2025 118 (H)  74 - 99 mg/dL Final    POCT Glucose 02/18/2025 100 (H)  74 - 99 mg/dL  Final    POCT Glucose 02/18/2025 98  74 - 99 mg/dL Final   Office Visit on 02/17/2025   Component Date Value Ref Range Status    POC Color, Urine 02/17/2025 Yellow  Straw, Yellow, Light-Yellow Final    POC Appearance, Urine 02/17/2025 Clear  Clear Final    POC Glucose, Urine 02/17/2025 NEGATIVE  NEGATIVE mg/dl Final    POC Bilirubin, Urine 02/17/2025 NEGATIVE  NEGATIVE Final    POC Ketones, Urine 02/17/2025 NEGATIVE  NEGATIVE mg/dl Final    POC Specific Gravity, Urine 02/17/2025 1.015  1.005 - 1.035 Final    POC Blood, Urine 02/17/2025 NEGATIVE  NEGATIVE Final    POC PH, Urine 02/17/2025 6.0  No Reference Range Established PH Final    POC Protein, Urine 02/17/2025 NEGATIVE  NEGATIVE mg/dl Final    POC Urobilinogen, Urine 02/17/2025 0.2  0.2, 1.0 EU/DL Final    Poc Nitrite, Urine 02/17/2025 NEGATIVE  NEGATIVE Final    POC Leukocytes, Urine 02/17/2025 NEGATIVE  NEGATIVE Final       Physical Exam  Constitutional:       General: She is not in acute distress.     Appearance: Normal appearance. She is obese. She is not ill-appearing.   HENT:      Head: Normocephalic and atraumatic.      Right Ear: External ear normal.      Left Ear: External ear normal.      Nose: Nose normal. No congestion or rhinorrhea.      Mouth/Throat:      Mouth: Mucous membranes are moist.      Pharynx: Oropharynx is clear. No oropharyngeal exudate or posterior oropharyngeal erythema.   Eyes:      General: No scleral icterus.     Extraocular Movements: Extraocular movements intact.      Conjunctiva/sclera: Conjunctivae normal.      Pupils: Pupils are equal, round, and reactive to light.   Cardiovascular:      Rate and Rhythm: Normal rate and regular rhythm.      Pulses: Normal pulses.      Heart sounds: Normal heart sounds. No murmur heard.  Pulmonary:      Effort: Pulmonary effort is normal. No respiratory distress.      Breath sounds: Normal breath sounds. No wheezing, rhonchi or rales.   Abdominal:      General: Abdomen is flat. Bowel sounds are  normal.      Palpations: Abdomen is soft.      Tenderness: There is no abdominal tenderness. There is no guarding or rebound.   Musculoskeletal:         General: No deformity. Normal range of motion.      Right lower leg: No edema.      Left lower leg: No edema.   Skin:     General: Skin is warm and dry.      Capillary Refill: Capillary refill takes less than 2 seconds.      Findings: Rash present. No lesion.      Comments: Diffuse Psoriatic lesions in lower back extending hitting her gluteal regions and upper back, also under mammary glands and inguinal folds   Neurological:      General: No focal deficit present.      Mental Status: She is alert and oriented to person, place, and time. Mental status is at baseline.      Sensory: Sensory deficit present.   Psychiatric:         Mood and Affect: Mood normal.         Behavior: Behavior normal.           Assessment/Plan   Diagnoses and all orders for this visit:  Medicare annual wellness visit, subsequent  Healthcare maintenance  Complete history and physical examination was performed  Requisition for routine lab work was ordered today  We will notify of test results once available and make treatment recommendations accordingly  Patient is up-to-date with immunization except for Prevnar 20 which she will receive at her pharmacy  Patient is up-to-date with mammogram  Requisition for colonoscopy was ordered today  Educated about importance of conducting a healthy lifestyle, following a well-balanced diet and exercising regularly    Benign essential hypertension  -     TSH with reflex to Free T4 if abnormal; Future  -     amLODIPine (Norvasc) 5 mg tablet; Take 1 tablet (5 mg) by mouth once daily. As directed  -     olmesartan-hydrochlorothiazide (BENIcar HCT) 40-25 mg tablet; Take 1 tablet by mouth once daily.  Stable, will continue same regimen  Refills were sent to her pharmacy  I would like to have you monitor and record blood pressures at home  Blood pressure goal  should be below 130/80, ideally 120/80    Hyperlipemia, mixed  -     Lipid Panel; Future  -     Comprehensive Metabolic Panel; Future  -     atorvastatin (Lipitor) 10 mg tablet; Take 1 tablet (10 mg) by mouth once every day.  Stable,.  We will recheck blood work and make treatment commendation accordingly  Meanwhile continue atorvastatin 10 mg daily, refill sent to the pharmacy  In the future we can discuss about screening with CT cardiac calcium score  The 10-year ASCVD risk score (Vanesa MCCORMACK, et al., 2019) is: 15.9%    Values used to calculate the score:      Age: 69 years      Sex: Female      Is Non- : No      Diabetic: Yes      Tobacco smoker: No      Systolic Blood Pressure: 104 mmHg      Is BP treated: Yes      HDL Cholesterol: 70.8 mg/dL      Total Cholesterol: 156 mg/dL  Cardiovascular risk discussed and, if needed, lifestyle modifications recommended, including nutritional choices, exercise, and elimination of habits contributing to risk.    We agreed on a plan to reduce the current cardiovascular risk    Type 2 diabetes mellitus without complication, without long-term current use of insulin (Multi)  Diabetic polyneuropathy associated with type 2 diabetes mellitus (Multi)  -     Vitamin B12; Future  -     Hemoglobin A1C; Future  Continue taking metformin 1000 mg twice daily and glipizide 2.5 mg daily  Patient can not take GLP-1 due to pancreatic lesions  Recommend continuing following closely with endocrinology and perform yearly foot exam and eye exams.    Vitamin B 12 deficiency  -     Vitamin B12; Future  -     cyanocobalamin (Vitamin B-12) 1,000 mcg tablet; Take 1 tablet (1,000 mcg) by mouth once daily.      Mild major depression, single episode (CMS-HCC)  Discussed and agreed to discontinue Zoloft 25 mg daily  Patient instructed to cut the tablet in half and take it for 7 to 9 days and then discontinue it.  Also instructed to monitor symptoms and if there is any concerns to  contact our office for reevaluation    Endometrial carcinoma (Multi)  Continue following with with your surgeon, per their recommendation     PA (psoriatic arthritis) (Multi)  -     triamcinolone (Kenalog) 0.1 % ointment; Apply topically 2 times a day as needed for irritation or rash.  Continue following with dermatology    Gastroesophageal reflux disease without esophagitis  -     omeprazole (PriLOSEC) 20 mg DR capsule; Take 1 capsule (20 mg) by mouth 2 times a day.  Stable, refills were sent to her local pharmacy    Benign neoplasm of pancreas  IPMN (intraductal papillary mucinous neoplasm)         -     CBC and Auto Differential; Future  Continue following with your surgery team    Encounter for screening for malignant neoplasm of colon  -     Colonoscopy Screening; Average Risk Patient; Future    Routine general medical examination at health care facility  -     1 Year Follow Up In Primary Care - Wellness Exam; Future     Thank you for letting us be a part of your care team.  Please call the office if you have further questions or concerns regarding your care    Otherwise, please follow-up in 6 months for continued care and refills     Alexander Haong MD  PGY2, FM Resident

## 2025-03-13 LAB
ALBUMIN SERPL-MCNC: 4.3 G/DL (ref 3.6–5.1)
ALP SERPL-CCNC: 87 U/L (ref 37–153)
ALT SERPL-CCNC: 14 U/L (ref 6–29)
ANION GAP SERPL CALCULATED.4IONS-SCNC: 10 MMOL/L (CALC) (ref 7–17)
AST SERPL-CCNC: 14 U/L (ref 10–35)
BASOPHILS # BLD AUTO: 129 CELLS/UL (ref 0–200)
BASOPHILS NFR BLD AUTO: 1.5 %
BILIRUB SERPL-MCNC: 0.4 MG/DL (ref 0.2–1.2)
BUN SERPL-MCNC: 26 MG/DL (ref 7–25)
CALCIUM SERPL-MCNC: 9.6 MG/DL (ref 8.6–10.4)
CHLORIDE SERPL-SCNC: 106 MMOL/L (ref 98–110)
CHOLEST SERPL-MCNC: 180 MG/DL
CHOLEST/HDLC SERPL: 2.3 (CALC)
CO2 SERPL-SCNC: 25 MMOL/L (ref 20–32)
CREAT SERPL-MCNC: 0.74 MG/DL (ref 0.6–1)
EGFRCR SERPLBLD CKD-EPI 2021: 86 ML/MIN/1.73M2
EOSINOPHIL # BLD AUTO: 310 CELLS/UL (ref 15–500)
EOSINOPHIL NFR BLD AUTO: 3.6 %
ERYTHROCYTE [DISTWIDTH] IN BLOOD BY AUTOMATED COUNT: 13.9 % (ref 11–15)
EST. AVERAGE GLUCOSE BLD GHB EST-MCNC: 157 MG/DL
EST. AVERAGE GLUCOSE BLD GHB EST-SCNC: 8.7 MMOL/L
GLUCOSE SERPL-MCNC: 112 MG/DL (ref 65–99)
HBA1C MFR BLD: 7.1 % OF TOTAL HGB
HCT VFR BLD AUTO: 36.6 % (ref 35–45)
HDLC SERPL-MCNC: 78 MG/DL
HGB BLD-MCNC: 11.6 G/DL (ref 11.7–15.5)
LDLC SERPL CALC-MCNC: 81 MG/DL (CALC)
LYMPHOCYTES # BLD AUTO: 2511 CELLS/UL (ref 850–3900)
LYMPHOCYTES NFR BLD AUTO: 29.2 %
MCH RBC QN AUTO: 26.7 PG (ref 27–33)
MCHC RBC AUTO-ENTMCNC: 31.7 G/DL (ref 32–36)
MCV RBC AUTO: 84.3 FL (ref 80–100)
MONOCYTES # BLD AUTO: 619 CELLS/UL (ref 200–950)
MONOCYTES NFR BLD AUTO: 7.2 %
NEUTROPHILS # BLD AUTO: 5031 CELLS/UL (ref 1500–7800)
NEUTROPHILS NFR BLD AUTO: 58.5 %
NONHDLC SERPL-MCNC: 102 MG/DL (CALC)
PLATELET # BLD AUTO: 384 THOUSAND/UL (ref 140–400)
PMV BLD REES-ECKER: 11.1 FL (ref 7.5–12.5)
POTASSIUM SERPL-SCNC: 4.8 MMOL/L (ref 3.5–5.3)
PROT SERPL-MCNC: 7.2 G/DL (ref 6.1–8.1)
RBC # BLD AUTO: 4.34 MILLION/UL (ref 3.8–5.1)
SODIUM SERPL-SCNC: 141 MMOL/L (ref 135–146)
TRIGL SERPL-MCNC: 110 MG/DL
TSH SERPL-ACNC: 1.08 MIU/L (ref 0.4–4.5)
VIT B12 SERPL-MCNC: 1827 PG/ML (ref 200–1100)
WBC # BLD AUTO: 8.6 THOUSAND/UL (ref 3.8–10.8)

## 2025-03-13 NOTE — RESULT ENCOUNTER NOTE
Sugar slightly elevated at 112 but otherwise liver, kidneys, electrolytes within normal limits    Complete blood cell count continues to show a slight anemia which I am not fully concerned about    Hemoglobin A1c is slightly elevated at 7.1% so at this time I would truly recommend diet and exercise and lifestyle changes alongside her medication    B12 is elevated at 1827 and this is most likely due to supplementation if she is currently taking anything so we will continue monitoring this closely and I would almost recommend that we slowly taper off and do a lower dosage    Thyroid within normal limits    Cholesterol looks good at 180, HDL 78, triglycerides 110, LDL 81

## 2025-03-14 LAB
ALBUMIN SERPL-MCNC: 4.1 G/DL (ref 3.6–5.1)
ALBUMIN/CREAT UR: NORMAL MG/G CREAT
BUN SERPL-MCNC: 26 MG/DL (ref 7–25)
BUN/CREAT SERPL: 36 (CALC) (ref 6–22)
CALCIUM SERPL-MCNC: 9.7 MG/DL (ref 8.6–10.4)
CHLORIDE SERPL-SCNC: 106 MMOL/L (ref 98–110)
CHOLEST SERPL-MCNC: 178 MG/DL
CHOLEST/HDLC SERPL: 2.3 (CALC)
CO2 SERPL-SCNC: 26 MMOL/L (ref 20–32)
CREAT SERPL-MCNC: 0.73 MG/DL (ref 0.6–1)
CREAT UR-MCNC: 80 MG/DL (ref 20–275)
EGFRCR SERPLBLD CKD-EPI 2021: 88 ML/MIN/1.73M2
EST. AVERAGE GLUCOSE BLD GHB EST-MCNC: 157 MG/DL
EST. AVERAGE GLUCOSE BLD GHB EST-SCNC: 8.7 MMOL/L
GLUCOSE SERPL-MCNC: 112 MG/DL (ref 65–99)
HBA1C MFR BLD: 7.1 % OF TOTAL HGB
HDLC SERPL-MCNC: 77 MG/DL
LDLC SERPL CALC-MCNC: 80 MG/DL (CALC)
MICROALBUMIN UR-MCNC: <0.2 MG/DL
NONHDLC SERPL-MCNC: 101 MG/DL (CALC)
PHOSPHATE SERPL-MCNC: 3.7 MG/DL (ref 2.1–4.3)
POTASSIUM SERPL-SCNC: 4.4 MMOL/L (ref 3.5–5.3)
SODIUM SERPL-SCNC: 143 MMOL/L (ref 135–146)
TRIGL SERPL-MCNC: 113 MG/DL

## 2025-03-17 DIAGNOSIS — Z12.11 COLON CANCER SCREENING: ICD-10-CM

## 2025-03-17 RX ORDER — SODIUM, POTASSIUM,MAG SULFATES 17.5-3.13G
SOLUTION, RECONSTITUTED, ORAL ORAL
Qty: 1 EACH | Refills: 0 | Status: SHIPPED | OUTPATIENT
Start: 2025-03-17

## 2025-03-19 ENCOUNTER — APPOINTMENT (OUTPATIENT)
Dept: ENDOCRINOLOGY | Facility: CLINIC | Age: 70
End: 2025-03-19
Payer: MEDICARE

## 2025-03-19 VITALS
WEIGHT: 196 LBS | HEIGHT: 64 IN | SYSTOLIC BLOOD PRESSURE: 113 MMHG | HEART RATE: 71 BPM | DIASTOLIC BLOOD PRESSURE: 63 MMHG | BODY MASS INDEX: 33.46 KG/M2

## 2025-03-19 DIAGNOSIS — E11.9 TYPE 2 DIABETES MELLITUS WITHOUT COMPLICATION, WITHOUT LONG-TERM CURRENT USE OF INSULIN (MULTI): Primary | ICD-10-CM

## 2025-03-19 DIAGNOSIS — E78.2 HYPERLIPEMIA, MIXED: ICD-10-CM

## 2025-03-19 PROCEDURE — 3078F DIAST BP <80 MM HG: CPT | Performed by: STUDENT IN AN ORGANIZED HEALTH CARE EDUCATION/TRAINING PROGRAM

## 2025-03-19 PROCEDURE — 1159F MED LIST DOCD IN RCRD: CPT | Performed by: STUDENT IN AN ORGANIZED HEALTH CARE EDUCATION/TRAINING PROGRAM

## 2025-03-19 PROCEDURE — 3008F BODY MASS INDEX DOCD: CPT | Performed by: STUDENT IN AN ORGANIZED HEALTH CARE EDUCATION/TRAINING PROGRAM

## 2025-03-19 PROCEDURE — 3074F SYST BP LT 130 MM HG: CPT | Performed by: STUDENT IN AN ORGANIZED HEALTH CARE EDUCATION/TRAINING PROGRAM

## 2025-03-19 PROCEDURE — 1123F ACP DISCUSS/DSCN MKR DOCD: CPT | Performed by: STUDENT IN AN ORGANIZED HEALTH CARE EDUCATION/TRAINING PROGRAM

## 2025-03-19 PROCEDURE — 95251 CONT GLUC MNTR ANALYSIS I&R: CPT | Performed by: STUDENT IN AN ORGANIZED HEALTH CARE EDUCATION/TRAINING PROGRAM

## 2025-03-19 PROCEDURE — 99214 OFFICE O/P EST MOD 30 MIN: CPT | Performed by: STUDENT IN AN ORGANIZED HEALTH CARE EDUCATION/TRAINING PROGRAM

## 2025-03-19 RX ORDER — BLOOD-GLUCOSE SENSOR
EACH MISCELLANEOUS
Qty: 2 EACH | Refills: 11 | Status: SHIPPED | OUTPATIENT
Start: 2025-03-19

## 2025-03-19 NOTE — PROGRESS NOTES
Follow Up: Diabetes     Dacia Babcock is a 69 y.o. female with PMHx of T2DM, Endometrial cancer, IPMN, HTN and HLD coming in for follow up of T2DM complicated by multiple yeast infections leading to discontinuation of Jardiance.     Interval history:   Last Seen in 9/24/24  DM regimen:   Metformin 500 mg extended release 2 tablets twice daily  Glipizide to 2.5 mg daily in AM which is largest meal of the day.  Dietary Snapshot:   Wakes up at 5:30 AM   BF @ 8 - Bagel with [place] or [place] cheese with fruit   Lunch @ 12:30 - 1PM, Salads with    Dinner @ 6:30 PM, varies - Salad and chicken  Snacks - popcorns - crackers and cheese- blackberries and rasb.  Beverages - Water/ iced tea unsweetened   Patient denies hypoglycemia.   Reports hyperglycemic surge following BF.  Weight: 196 pounds   Fx: Fell in 2018, fx of right radius and ulna - slipped on ice  Bowels: Constipation   Urinary sx - hospitalized at [facility] 1 month ago, IV abx - with repeat imaging of the abdomen: Increased size of pancreatic tail cyst measuring 1.8 cm x 1.4 cm and  new somewhat ill-defined hypodense lesion in the pancreatic body  measuring 0.9 cm x 0.9 cm that could relate to additional cyst. Colonoscopy in 4/25. Patient does endorse. shooting pains b/l feet. Seen by neurologist in Nov 2024- Dr. Samuels. Gabapentin dose 100 mg in AM - taking 300 mg in bedtime.    Vitals:    03/19/25 1004   BP: 113/63   Pulse: 71    Constitutional:       General: She is not in acute distress.     Appearance: Normal appearance.   Eyes:      Extraocular Movements: Extraocular movements intact. +[place].     Pupils: Pupils are equal, round, and reactive to light.   Cardiovascular:      Rate and Rhythm: Normal rate and regular rhythm.   Pulmonary:      Effort: Pulmonary effort is normal. No respiratory distress.      Breath sounds: Normal breath sounds.   Abdominal:      General: Bowel sounds are normal.      Palpations: Abdomen is soft.       Tenderness: There is no abdominal tenderness.   Skin:     Coloration: Skin is not jaundiced or pale.      Findings: No erythema or rash.   Neurological:      General: No focal deficit present.      Mental Status: She is alert and oriented to person, place, and time.      Deep Tendon Reflexes: Reflexes normal.     Diabetic Foot exam done today 3/19/25 - PN + vibration and proprioception - wound on dorsal aspect of right foot   Psychiatric:         Mood and Affect: Mood normal.         Behavior: Behavior normal.     Current Outpatient Medications:     acetaminophen (Tylenol) 500 mg tablet, Take 1 tablet (500 mg) by mouth every 4 hours if needed for moderate pain (4 - 6)., Disp: , Rfl:     amLODIPine (Norvasc) 5 mg tablet, Take 1 tablet (5 mg) by mouth once daily. As directed, Disp: 90 tablet, Rfl: 1    aspirin 81 mg EC tablet, Take 1 tablet (81 mg) by mouth once daily., Disp: , Rfl:     atorvastatin (Lipitor) 10 mg tablet, Take 1 tablet (10 mg) by mouth once every day., Disp: 90 tablet, Rfl: 1    blood sugar diagnostic (Accu-Chek Guide test strips) strip, 1 strip once daily., Disp: , Rfl:     calcium citrate (Calcitrate) 200 mg (950 mg) tablet, Take 1 tablet (200 mg) by mouth once daily., Disp: , Rfl:     flash glucose sensor kit (FreeStyle Shyla 2 Sensor) kit, use to test blood sugar daily, Disp: 1 each, Rfl: 0    FreeStyle Shyla 2 Sensor kit, Use as instructed, Disp: 6 each, Rfl: 3    gabapentin (Neurontin) 100 mg capsule, Take 1 capsule (100 mg) by mouth 2 times daily (morning and midday)., Disp: 180 capsule, Rfl: 1    gabapentin (Neurontin) 300 mg capsule, Take 1 capsule (300 mg) by mouth once daily at bedtime. Take at bedtime., Disp: 90 capsule, Rfl: 1    glipiZIDE XL (Glucotrol XL) 2.5 mg 24 hr tablet, Take 1 tablet (2.5 mg) by mouth once daily with breakfast. Do not crush, chew, or split., Disp: 90 tablet, Rfl: 3    ibuprofen 600 mg tablet, Take 1 tablet (600 mg) by mouth every 6 hours if needed., Disp: , Rfl:      metFORMIN  mg 24 hr tablet, Take 2 tablets (1,000 mg) by mouth 2 times a day., Disp: 360 tablet, Rfl: 1    methocarbamol (Robaxin) 500 mg tablet, Take 1 tablet (500 mg) by mouth 4 times a day as needed for muscle spasms., Disp: 16 tablet, Rfl: 0    olmesartan-hydrochlorothiazide (BENIcar HCT) 40-25 mg tablet, Take 1 tablet by mouth once daily., Disp: 90 tablet, Rfl: 1    omeprazole (PriLOSEC) 20 mg DR capsule, Take 1 capsule (20 mg) by mouth 2 times a day., Disp: 180 capsule, Rfl: 1    sodium,potassium,mag sulfates (Suprep) 17.5-3.13-1.6 gram solution, Take one bottle beginning at 6pm night before procedure and then take the other bottle 5 hours before procedure time as directed per instruction sheet, Disp: 1 each, Rfl: 0    triamcinolone (Kenalog) 0.1 % ointment, Apply topically 2 times a day as needed for irritation or rash., Disp: 80 g, Rfl: 3     Labs:   TSH W/REFLEX TO FT4   Date/Time Value Ref Range Status   03/12/2025 09:34 AM 1.08 0.40 - 4.50 mIU/L Final     Lab Results   Component Value Date     CHOL 156 12/20/2023     CHOL 144 09/15/2023     CHOL 180 03/07/2023            Lab Results   Component Value Date     HDL 70.8 12/20/2023     HDL 58.5 09/15/2023     HDL 70.8 03/07/2023            Lab Results   Component Value Date     LDLCALC 62 12/20/2023            Lab Results   Component Value Date     TRIG 114 12/20/2023     TRIG 120 09/15/2023     TRIG 119 03/07/2023    A1C 7.1 % - 3/12/25, 6.8 % in 7/24        Assessment/Plan:   # DM2 c/b Peripheral Neuropathy   # Vitamin D deficiency  Today - 3/19/25:   Glipizide of 2.5 mg orally daily in AM to be taken 30 min before breakfast. A1C at goal. Options if need be would include changing from extended to immediate release - not at the time being.   Per CGM interpretation, no hypoglycemia - no changes to current regimen to be implemented, GMI at 6.8.  >>Surveillance:   Peripheral Neuropathy - due in 10/25   Ophthalmo - due in 10/25 no  retinopathy  Hyperlipidemia-Atorva 10 mg orally a day - LDL 80, 3/12/25.  Today also encouraged adherence to daily Vitamin D of 2000 units.  Patient is aware and agreeable to Plan.  Return in 6 months.   Patient is discussed, seen, and examined with Dr. Youngblood who agrees with the management plan.

## 2025-04-09 ENCOUNTER — APPOINTMENT (OUTPATIENT)
Dept: GASTROENTEROLOGY | Facility: EXTERNAL LOCATION | Age: 70
End: 2025-04-09
Payer: MEDICARE

## 2025-04-09 DIAGNOSIS — Z86.0101 HISTORY OF ADENOMATOUS POLYP OF COLON: Primary | ICD-10-CM

## 2025-04-09 DIAGNOSIS — Z12.11 ENCOUNTER FOR SCREENING FOR MALIGNANT NEOPLASM OF COLON: ICD-10-CM

## 2025-04-09 PROCEDURE — 1123F ACP DISCUSS/DSCN MKR DOCD: CPT | Performed by: INTERNAL MEDICINE

## 2025-04-09 PROCEDURE — 45378 DIAGNOSTIC COLONOSCOPY: CPT | Performed by: INTERNAL MEDICINE

## 2025-04-23 ENCOUNTER — OFFICE VISIT (OUTPATIENT)
Dept: URGENT CARE | Age: 70
End: 2025-04-23
Payer: MEDICARE

## 2025-04-23 VITALS
BODY MASS INDEX: 32.61 KG/M2 | SYSTOLIC BLOOD PRESSURE: 120 MMHG | DIASTOLIC BLOOD PRESSURE: 66 MMHG | HEART RATE: 69 BPM | WEIGHT: 190 LBS | RESPIRATION RATE: 16 BRPM | OXYGEN SATURATION: 97 % | TEMPERATURE: 98.2 F

## 2025-04-23 DIAGNOSIS — M79.10 MUSCLE SORENESS: Primary | ICD-10-CM
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
## 2025-04-23 PROCEDURE — 3074F SYST BP LT 130 MM HG: CPT | Performed by: NURSE PRACTITIONER

## 2025-04-23 PROCEDURE — 3078F DIAST BP <80 MM HG: CPT | Performed by: NURSE PRACTITIONER

## 2025-04-23 PROCEDURE — 1123F ACP DISCUSS/DSCN MKR DOCD: CPT | Performed by: NURSE PRACTITIONER

## 2025-04-23 PROCEDURE — 99213 OFFICE O/P EST LOW 20 MIN: CPT | Performed by: NURSE PRACTITIONER

## 2025-04-23 PROCEDURE — 1036F TOBACCO NON-USER: CPT | Performed by: NURSE PRACTITIONER

## 2025-04-23 PROCEDURE — 1159F MED LIST DOCD IN RCRD: CPT | Performed by: NURSE PRACTITIONER

## 2025-04-23 RX ORDER — METHOCARBAMOL 500 MG/1
500 TABLET, FILM COATED ORAL 4 TIMES DAILY PRN
Qty: 40 TABLET | Refills: 0 | Status: SHIPPED | OUTPATIENT
Start: 2025-04-23 | End: 2025-06-22

## 2025-04-23 ASSESSMENT — ENCOUNTER SYMPTOMS
LOSS OF SENSATION IN FEET: 0
OCCASIONAL FEELINGS OF UNSTEADINESS: 1
DEPRESSION: 0
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
## 2025-05-15 ENCOUNTER — APPOINTMENT (OUTPATIENT)
Dept: PRIMARY CARE | Facility: CLINIC | Age: 70
End: 2025-05-15
Payer: MEDICARE

## 2025-05-15 VITALS
SYSTOLIC BLOOD PRESSURE: 116 MMHG | BODY MASS INDEX: 33.47 KG/M2 | DIASTOLIC BLOOD PRESSURE: 70 MMHG | HEART RATE: 85 BPM | WEIGHT: 195 LBS | OXYGEN SATURATION: 97 %

## 2025-05-15 DIAGNOSIS — M54.50 LUMBAR SPINE PAIN: ICD-10-CM

## 2025-05-15 DIAGNOSIS — M54.50 ACUTE LEFT-SIDED LOW BACK PAIN WITHOUT SCIATICA: ICD-10-CM

## 2025-05-15 DIAGNOSIS — M54.2 NECK PAIN: ICD-10-CM

## 2025-05-15 DIAGNOSIS — M79.10 MUSCLE SORENESS: ICD-10-CM
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
## 2025-05-15 PROCEDURE — G2211 COMPLEX E/M VISIT ADD ON: HCPCS | Performed by: STUDENT IN AN ORGANIZED HEALTH CARE EDUCATION/TRAINING PROGRAM

## 2025-05-15 PROCEDURE — 1036F TOBACCO NON-USER: CPT | Performed by: STUDENT IN AN ORGANIZED HEALTH CARE EDUCATION/TRAINING PROGRAM

## 2025-05-15 PROCEDURE — 1159F MED LIST DOCD IN RCRD: CPT | Performed by: STUDENT IN AN ORGANIZED HEALTH CARE EDUCATION/TRAINING PROGRAM

## 2025-05-15 PROCEDURE — 99214 OFFICE O/P EST MOD 30 MIN: CPT | Performed by: STUDENT IN AN ORGANIZED HEALTH CARE EDUCATION/TRAINING PROGRAM

## 2025-05-15 PROCEDURE — 3074F SYST BP LT 130 MM HG: CPT | Performed by: STUDENT IN AN ORGANIZED HEALTH CARE EDUCATION/TRAINING PROGRAM

## 2025-05-15 PROCEDURE — 3078F DIAST BP <80 MM HG: CPT | Performed by: STUDENT IN AN ORGANIZED HEALTH CARE EDUCATION/TRAINING PROGRAM

## 2025-05-15 RX ORDER — METHOCARBAMOL 500 MG/1
500 TABLET, FILM COATED ORAL 4 TIMES DAILY PRN
Qty: 40 TABLET | Refills: 0 | Status: SHIPPED | OUTPATIENT
Start: 2025-05-15 | End: 2025-07-14

## 2025-05-15 ASSESSMENT — PATIENT HEALTH QUESTIONNAIRE - PHQ9
SUM OF ALL RESPONSES TO PHQ9 QUESTIONS 1 AND 2: 0
2. FEELING DOWN, DEPRESSED OR HOPELESS: NOT AT ALL
1. LITTLE INTEREST OR PLEASURE IN DOING THINGS: NOT AT ALL
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
## 2025-05-19 ENCOUNTER — HOSPITAL ENCOUNTER (OUTPATIENT)
Dept: RADIOLOGY | Facility: CLINIC | Age: 70
Discharge: HOME | End: 2025-05-19
Payer: MEDICARE

## 2025-05-19 DIAGNOSIS — M54.50 LUMBAR SPINE PAIN: ICD-10-CM

## 2025-05-19 DIAGNOSIS — M54.50 ACUTE LEFT-SIDED LOW BACK PAIN WITHOUT SCIATICA: ICD-10-CM

## 2025-05-19 DIAGNOSIS — M79.10 MUSCLE SORENESS: ICD-10-CM

## 2025-05-19 DIAGNOSIS — M54.2 NECK PAIN: ICD-10-CM
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
## 2025-05-19 PROCEDURE — 72040 X-RAY EXAM NECK SPINE 2-3 VW: CPT | Performed by: RADIOLOGY

## 2025-05-19 PROCEDURE — 72110 X-RAY EXAM L-2 SPINE 4/>VWS: CPT

## 2025-05-19 PROCEDURE — 72040 X-RAY EXAM NECK SPINE 2-3 VW: CPT

## 2025-05-19 PROCEDURE — 72110 X-RAY EXAM L-2 SPINE 4/>VWS: CPT | Performed by: RADIOLOGY

## 2025-05-20 ENCOUNTER — EVALUATION (OUTPATIENT)
Dept: PHYSICAL THERAPY | Facility: CLINIC | Age: 70
End: 2025-05-20
Payer: MEDICARE

## 2025-05-20 DIAGNOSIS — M54.2 NECK PAIN: ICD-10-CM

## 2025-05-20 DIAGNOSIS — M79.10 MUSCLE SORENESS: ICD-10-CM

## 2025-05-20 DIAGNOSIS — M54.50 ACUTE LEFT-SIDED LOW BACK PAIN WITHOUT SCIATICA: ICD-10-CM

## 2025-05-20 DIAGNOSIS — M54.50 LUMBAR SPINE PAIN: ICD-10-CM
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
## 2025-05-20 PROCEDURE — 97110 THERAPEUTIC EXERCISES: CPT | Mod: GP | Performed by: PHYSICAL THERAPIST

## 2025-05-20 PROCEDURE — 97161 PT EVAL LOW COMPLEX 20 MIN: CPT | Mod: GP | Performed by: PHYSICAL THERAPIST
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
## 2025-05-21 PROBLEM — M54.2 NECK PAIN: Status: ACTIVE | Noted: 2025-05-21

## 2025-05-21 PROBLEM — M54.50 ACUTE LEFT-SIDED LOW BACK PAIN WITHOUT SCIATICA: Status: ACTIVE | Noted: 2025-05-21

## 2025-05-21 ASSESSMENT — ENCOUNTER SYMPTOMS
LOSS OF SENSATION IN FEET: 0
DEPRESSION: 0
OCCASIONAL FEELINGS OF UNSTEADINESS: 0

## 2025-05-21 NOTE — RESULT ENCOUNTER NOTE
Cervical spine x-ray shows no acute fractures but does note some degenerative changes  Lumbosacral spine shows degenerative changes as well but no evidence of any fractures

## 2025-05-27 ENCOUNTER — TREATMENT (OUTPATIENT)
Dept: PHYSICAL THERAPY | Facility: CLINIC | Age: 70
End: 2025-05-27
Payer: MEDICARE
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
## 2025-05-27 DIAGNOSIS — M79.10 MUSCLE SORENESS: ICD-10-CM

## 2025-05-27 DIAGNOSIS — M54.50 LUMBAR SPINE PAIN: ICD-10-CM

## 2025-05-27 DIAGNOSIS — M54.2 NECK PAIN: ICD-10-CM

## 2025-05-27 DIAGNOSIS — M54.50 ACUTE LEFT-SIDED LOW BACK PAIN WITHOUT SCIATICA: ICD-10-CM

## 2025-05-27 PROCEDURE — 97110 THERAPEUTIC EXERCISES: CPT | Mod: GP | Performed by: PHYSICAL THERAPIST
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
## 2025-06-05 ENCOUNTER — TREATMENT (OUTPATIENT)
Dept: PHYSICAL THERAPY | Facility: CLINIC | Age: 70
End: 2025-06-05
Payer: MEDICARE

## 2025-06-05 DIAGNOSIS — M54.2 NECK PAIN: ICD-10-CM

## 2025-06-05 DIAGNOSIS — M54.50 LUMBAR SPINE PAIN: ICD-10-CM

## 2025-06-05 DIAGNOSIS — M79.10 MUSCLE SORENESS: ICD-10-CM
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
## 2025-06-05 DIAGNOSIS — M54.50 ACUTE LEFT-SIDED LOW BACK PAIN WITHOUT SCIATICA: ICD-10-CM

## 2025-06-05 PROCEDURE — 97110 THERAPEUTIC EXERCISES: CPT | Mod: GP
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
## 2025-06-17 ENCOUNTER — TREATMENT (OUTPATIENT)
Dept: PHYSICAL THERAPY | Facility: CLINIC | Age: 70
End: 2025-06-17
Payer: MEDICARE

## 2025-06-17 DIAGNOSIS — M54.50 ACUTE LEFT-SIDED LOW BACK PAIN WITHOUT SCIATICA: ICD-10-CM
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
## 2025-06-17 DIAGNOSIS — M79.10 MUSCLE SORENESS: ICD-10-CM

## 2025-06-17 DIAGNOSIS — M54.50 LUMBAR SPINE PAIN: ICD-10-CM

## 2025-06-17 DIAGNOSIS — M54.2 NECK PAIN: ICD-10-CM

## 2025-06-17 PROCEDURE — 97110 THERAPEUTIC EXERCISES: CPT | Mod: GP | Performed by: PHYSICAL THERAPIST
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
## 2025-06-24 ENCOUNTER — APPOINTMENT (OUTPATIENT)
Dept: PRIMARY CARE | Facility: CLINIC | Age: 70
End: 2025-06-24
Payer: MEDICARE

## 2025-06-24 VITALS
HEART RATE: 76 BPM | WEIGHT: 195.8 LBS | OXYGEN SATURATION: 96 % | DIASTOLIC BLOOD PRESSURE: 68 MMHG | TEMPERATURE: 97.2 F | SYSTOLIC BLOOD PRESSURE: 114 MMHG | BODY MASS INDEX: 33.43 KG/M2 | HEIGHT: 64 IN

## 2025-06-24 DIAGNOSIS — E11.9 TYPE 2 DIABETES MELLITUS WITHOUT COMPLICATION, WITHOUT LONG-TERM CURRENT USE OF INSULIN: Primary | ICD-10-CM

## 2025-06-24 DIAGNOSIS — E78.2 HYPERLIPEMIA, MIXED: ICD-10-CM

## 2025-06-24 LAB — POC HEMOGLOBIN A1C: 7 % (ref 4.2–6.5)

## 2025-06-24 PROCEDURE — 1160F RVW MEDS BY RX/DR IN RCRD: CPT | Performed by: STUDENT IN AN ORGANIZED HEALTH CARE EDUCATION/TRAINING PROGRAM

## 2025-06-24 PROCEDURE — 99214 OFFICE O/P EST MOD 30 MIN: CPT | Performed by: STUDENT IN AN ORGANIZED HEALTH CARE EDUCATION/TRAINING PROGRAM

## 2025-06-24 PROCEDURE — 3074F SYST BP LT 130 MM HG: CPT | Performed by: STUDENT IN AN ORGANIZED HEALTH CARE EDUCATION/TRAINING PROGRAM

## 2025-06-24 PROCEDURE — 3078F DIAST BP <80 MM HG: CPT | Performed by: STUDENT IN AN ORGANIZED HEALTH CARE EDUCATION/TRAINING PROGRAM

## 2025-06-24 PROCEDURE — 3051F HG A1C>EQUAL 7.0%<8.0%: CPT | Performed by: STUDENT IN AN ORGANIZED HEALTH CARE EDUCATION/TRAINING PROGRAM

## 2025-06-24 PROCEDURE — 1036F TOBACCO NON-USER: CPT | Performed by: STUDENT IN AN ORGANIZED HEALTH CARE EDUCATION/TRAINING PROGRAM

## 2025-06-24 PROCEDURE — 83036 HEMOGLOBIN GLYCOSYLATED A1C: CPT | Performed by: STUDENT IN AN ORGANIZED HEALTH CARE EDUCATION/TRAINING PROGRAM

## 2025-06-24 PROCEDURE — G2211 COMPLEX E/M VISIT ADD ON: HCPCS | Performed by: STUDENT IN AN ORGANIZED HEALTH CARE EDUCATION/TRAINING PROGRAM

## 2025-06-24 PROCEDURE — 1159F MED LIST DOCD IN RCRD: CPT | Performed by: STUDENT IN AN ORGANIZED HEALTH CARE EDUCATION/TRAINING PROGRAM

## 2025-06-24 PROCEDURE — 3008F BODY MASS INDEX DOCD: CPT | Performed by: STUDENT IN AN ORGANIZED HEALTH CARE EDUCATION/TRAINING PROGRAM

## 2025-06-24 RX ORDER — BLOOD-GLUCOSE SENSOR
1 EACH MISCELLANEOUS
Qty: 2 EACH | Refills: 11 | Status: SHIPPED | OUTPATIENT
Start: 2025-06-24

## 2025-06-24 NOTE — ASSESSMENT & PLAN NOTE
Orders:    POCT glycosylated hemoglobin (Hb A1C) manually resulted    blood-glucose sensor (FreeStyle Shyla 2 Plus Sensor) device; 1 each every 14 (fourteen) days.  continue metformin xr  1000 mg BID and glipizide xr 2.5 mg at bedtime  A1C is at goal, 7%

## 2025-06-24 NOTE — PROGRESS NOTES
"  Assessment/Plan   Assessment & Plan  Type 2 diabetes mellitus without complication, without long-term current use of insulin    Orders:    POCT glycosylated hemoglobin (Hb A1C) manually resulted    blood-glucose sensor (FreeStyle Shyla 2 Plus Sensor) device; 1 each every 14 (fourteen) days.  continue metformin xr  1000 mg BID and glipizide xr 2.5 mg at bedtime  A1C is at goal, 7%  Hyperlipemia, mixed  Chronic, stable, continue atorvastatin 10 mg qd           Subjective   Patient ID: Dacia Babcock \"Allyssa" is a 69 y.o. female who presents for New Patient Visit and Establish Care (Need to establish with new PCP/Just had [facility] in March this year along with labs./).  HPI  DM2  HLD   She is on metformin xr 1000 mg BID and glipizide xr 2.5 mg every day, benicar hydrochlorothiazide 40-25 mg every day and atorvastatin 10 mg at bedtime  A1C 7% today, at goal for her age    Objective   Physical Exam  Constitutional:       Appearance: Normal appearance.   HENT:      Head: Normocephalic and atraumatic.   Cardiovascular:      Rate and Rhythm: Normal rate and regular rhythm.   Neurological:      Mental Status: She is alert.              Monty Sales MD 06/24/25 3:27 PM   "

## 2025-09-23 ENCOUNTER — APPOINTMENT (OUTPATIENT)
Facility: CLINIC | Age: 70
End: 2025-09-23
Payer: MEDICARE